# Patient Record
Sex: FEMALE | Race: WHITE | NOT HISPANIC OR LATINO | Employment: OTHER | URBAN - METROPOLITAN AREA
[De-identification: names, ages, dates, MRNs, and addresses within clinical notes are randomized per-mention and may not be internally consistent; named-entity substitution may affect disease eponyms.]

---

## 2023-02-27 ENCOUNTER — HOSPITAL ENCOUNTER (INPATIENT)
Facility: HOSPITAL | Age: 88
LOS: 2 days | Discharge: HOME HEALTH | End: 2023-03-02
Attending: EMERGENCY MEDICINE | Admitting: INTERNAL MEDICINE

## 2023-02-27 ENCOUNTER — APPOINTMENT (EMERGENCY)
Dept: RADIOLOGY | Facility: HOSPITAL | Age: 88
End: 2023-02-27

## 2023-02-27 ENCOUNTER — APPOINTMENT (EMERGENCY)
Dept: CT IMAGING | Facility: HOSPITAL | Age: 88
End: 2023-02-27

## 2023-02-27 DIAGNOSIS — N39.0 URINARY TRACT INFECTION WITHOUT HEMATURIA, SITE UNSPECIFIED: ICD-10-CM

## 2023-02-27 DIAGNOSIS — R29.6 FREQUENT FALLS: ICD-10-CM

## 2023-02-27 DIAGNOSIS — N39.0 URINARY TRACT INFECTION: ICD-10-CM

## 2023-02-27 DIAGNOSIS — R53.1 GENERALIZED WEAKNESS: Primary | ICD-10-CM

## 2023-02-27 LAB
ALBUMIN SERPL BCP-MCNC: 3.7 G/DL (ref 3.5–5)
ALP SERPL-CCNC: 79 U/L (ref 34–104)
ALT SERPL W P-5'-P-CCNC: 10 U/L (ref 7–52)
ANION GAP SERPL CALCULATED.3IONS-SCNC: 8 MMOL/L (ref 4–13)
APTT PPP: 27 SECONDS (ref 23–37)
AST SERPL W P-5'-P-CCNC: 13 U/L (ref 13–39)
BASOPHILS # BLD AUTO: 0.06 THOUSANDS/ÂΜL (ref 0–0.1)
BASOPHILS NFR BLD AUTO: 0 % (ref 0–1)
BILIRUB SERPL-MCNC: 0.49 MG/DL (ref 0.2–1)
BUN SERPL-MCNC: 36 MG/DL (ref 5–25)
CALCIUM SERPL-MCNC: 9.4 MG/DL (ref 8.4–10.2)
CHLORIDE SERPL-SCNC: 105 MMOL/L (ref 96–108)
CO2 SERPL-SCNC: 26 MMOL/L (ref 21–32)
CREAT SERPL-MCNC: 1.64 MG/DL (ref 0.6–1.3)
EOSINOPHIL # BLD AUTO: 0.31 THOUSAND/ÂΜL (ref 0–0.61)
EOSINOPHIL NFR BLD AUTO: 2 % (ref 0–6)
ERYTHROCYTE [DISTWIDTH] IN BLOOD BY AUTOMATED COUNT: 14.3 % (ref 11.6–15.1)
GFR SERPL CREATININE-BSD FRML MDRD: 27 ML/MIN/1.73SQ M
GLUCOSE SERPL-MCNC: 104 MG/DL (ref 65–140)
GLUCOSE SERPL-MCNC: 108 MG/DL (ref 65–140)
HCT VFR BLD AUTO: 31.1 % (ref 34.8–46.1)
HGB BLD-MCNC: 9.9 G/DL (ref 11.5–15.4)
IMM GRANULOCYTES # BLD AUTO: 0.06 THOUSAND/UL (ref 0–0.2)
IMM GRANULOCYTES NFR BLD AUTO: 0 % (ref 0–2)
INR PPP: 0.93 (ref 0.84–1.19)
LACTATE SERPL-SCNC: 0.6 MMOL/L (ref 0.5–2)
LYMPHOCYTES # BLD AUTO: 2.83 THOUSANDS/ÂΜL (ref 0.6–4.47)
LYMPHOCYTES NFR BLD AUTO: 20 % (ref 14–44)
MCH RBC QN AUTO: 30.2 PG (ref 26.8–34.3)
MCHC RBC AUTO-ENTMCNC: 31.8 G/DL (ref 31.4–37.4)
MCV RBC AUTO: 95 FL (ref 82–98)
MONOCYTES # BLD AUTO: 1.18 THOUSAND/ÂΜL (ref 0.17–1.22)
MONOCYTES NFR BLD AUTO: 8 % (ref 4–12)
NEUTROPHILS # BLD AUTO: 10.08 THOUSANDS/ÂΜL (ref 1.85–7.62)
NEUTS SEG NFR BLD AUTO: 70 % (ref 43–75)
NRBC BLD AUTO-RTO: 0 /100 WBCS
PLATELET # BLD AUTO: 278 THOUSANDS/UL (ref 149–390)
PMV BLD AUTO: 10.2 FL (ref 8.9–12.7)
POTASSIUM SERPL-SCNC: 3.8 MMOL/L (ref 3.5–5.3)
PROT SERPL-MCNC: 8 G/DL (ref 6.4–8.4)
PROTHROMBIN TIME: 13.2 SECONDS (ref 11.6–14.5)
RBC # BLD AUTO: 3.28 MILLION/UL (ref 3.81–5.12)
SODIUM SERPL-SCNC: 139 MMOL/L (ref 135–147)
WBC # BLD AUTO: 14.52 THOUSAND/UL (ref 4.31–10.16)

## 2023-02-27 RX ORDER — LEVOTHYROXINE SODIUM 0.1 MG/1
100 TABLET ORAL DAILY
COMMUNITY

## 2023-02-27 RX ORDER — FESOTERODINE FUMARATE 8 MG/1
8 TABLET, EXTENDED RELEASE ORAL DAILY
COMMUNITY
Start: 2023-02-06 | End: 2024-02-01

## 2023-02-27 RX ORDER — ASPIRIN 81 MG/1
81 TABLET ORAL
COMMUNITY

## 2023-02-27 RX ORDER — TIOTROPIUM BROMIDE INHALATION SPRAY 3.12 UG/1
2 SPRAY, METERED RESPIRATORY (INHALATION) 2 TIMES DAILY
COMMUNITY
Start: 2022-12-16

## 2023-02-27 RX ORDER — ALBUTEROL SULFATE 90 UG/1
AEROSOL, METERED RESPIRATORY (INHALATION)
COMMUNITY
Start: 2022-12-16

## 2023-02-28 PROBLEM — E03.9 HYPOTHYROIDISM: Status: ACTIVE | Noted: 2023-02-28

## 2023-02-28 PROBLEM — N39.0 UTI (URINARY TRACT INFECTION): Status: ACTIVE | Noted: 2023-02-28

## 2023-02-28 PROBLEM — J44.9 COPD (CHRONIC OBSTRUCTIVE PULMONARY DISEASE) (HCC): Status: ACTIVE | Noted: 2023-02-28

## 2023-02-28 PROBLEM — N18.30 CKD (CHRONIC KIDNEY DISEASE) STAGE 3, GFR 30-59 ML/MIN (HCC): Status: ACTIVE | Noted: 2023-02-28

## 2023-02-28 PROBLEM — R53.1 GENERALIZED WEAKNESS: Status: ACTIVE | Noted: 2023-02-28

## 2023-02-28 PROBLEM — E11.9 TYPE 2 DIABETES MELLITUS, WITHOUT LONG-TERM CURRENT USE OF INSULIN (HCC): Status: ACTIVE | Noted: 2023-02-28

## 2023-02-28 LAB
2HR DELTA HS TROPONIN: 0 NG/L
ANION GAP SERPL CALCULATED.3IONS-SCNC: 7 MMOL/L (ref 4–13)
ATRIAL RATE: 73 BPM
BACTERIA UR QL AUTO: ABNORMAL /HPF
BASOPHILS # BLD AUTO: 0.06 THOUSANDS/ÂΜL (ref 0–0.1)
BASOPHILS NFR BLD AUTO: 0 % (ref 0–1)
BILIRUB UR QL STRIP: NEGATIVE
BUN SERPL-MCNC: 32 MG/DL (ref 5–25)
CALCIUM SERPL-MCNC: 9.3 MG/DL (ref 8.4–10.2)
CARDIAC TROPONIN I PNL SERPL HS: 12 NG/L
CARDIAC TROPONIN I PNL SERPL HS: 12 NG/L
CHLORIDE SERPL-SCNC: 106 MMOL/L (ref 96–108)
CLARITY UR: ABNORMAL
CO2 SERPL-SCNC: 27 MMOL/L (ref 21–32)
COLOR UR: ABNORMAL
CREAT SERPL-MCNC: 1.53 MG/DL (ref 0.6–1.3)
EOSINOPHIL # BLD AUTO: 0.31 THOUSAND/ÂΜL (ref 0–0.61)
EOSINOPHIL NFR BLD AUTO: 2 % (ref 0–6)
ERYTHROCYTE [DISTWIDTH] IN BLOOD BY AUTOMATED COUNT: 14.3 % (ref 11.6–15.1)
FLUAV RNA RESP QL NAA+PROBE: NEGATIVE
FLUBV RNA RESP QL NAA+PROBE: NEGATIVE
GFR SERPL CREATININE-BSD FRML MDRD: 30 ML/MIN/1.73SQ M
GLUCOSE SERPL-MCNC: 106 MG/DL (ref 65–140)
GLUCOSE SERPL-MCNC: 109 MG/DL (ref 65–140)
GLUCOSE SERPL-MCNC: 114 MG/DL (ref 65–140)
GLUCOSE SERPL-MCNC: 143 MG/DL (ref 65–140)
GLUCOSE UR STRIP-MCNC: NEGATIVE MG/DL
HCT VFR BLD AUTO: 31.5 % (ref 34.8–46.1)
HGB BLD-MCNC: 10 G/DL (ref 11.5–15.4)
HGB UR QL STRIP.AUTO: ABNORMAL
IMM GRANULOCYTES # BLD AUTO: 0.09 THOUSAND/UL (ref 0–0.2)
IMM GRANULOCYTES NFR BLD AUTO: 1 % (ref 0–2)
KETONES UR STRIP-MCNC: NEGATIVE MG/DL
LEUKOCYTE ESTERASE UR QL STRIP: ABNORMAL
LYMPHOCYTES # BLD AUTO: 2.83 THOUSANDS/ÂΜL (ref 0.6–4.47)
LYMPHOCYTES NFR BLD AUTO: 21 % (ref 14–44)
MCH RBC QN AUTO: 30.4 PG (ref 26.8–34.3)
MCHC RBC AUTO-ENTMCNC: 31.7 G/DL (ref 31.4–37.4)
MCV RBC AUTO: 96 FL (ref 82–98)
MONOCYTES # BLD AUTO: 1.25 THOUSAND/ÂΜL (ref 0.17–1.22)
MONOCYTES NFR BLD AUTO: 9 % (ref 4–12)
NEUTROPHILS # BLD AUTO: 8.94 THOUSANDS/ÂΜL (ref 1.85–7.62)
NEUTS SEG NFR BLD AUTO: 67 % (ref 43–75)
NITRITE UR QL STRIP: POSITIVE
NON-SQ EPI CELLS URNS QL MICRO: ABNORMAL /HPF
NRBC BLD AUTO-RTO: 0 /100 WBCS
OTHER STN SPEC: ABNORMAL
P AXIS: 65 DEGREES
PH UR STRIP.AUTO: 6 [PH]
PLATELET # BLD AUTO: 261 THOUSANDS/UL (ref 149–390)
PMV BLD AUTO: 10.3 FL (ref 8.9–12.7)
POTASSIUM SERPL-SCNC: 3.8 MMOL/L (ref 3.5–5.3)
PR INTERVAL: 124 MS
PROCALCITONIN SERPL-MCNC: 0.06 NG/ML
PROT UR STRIP-MCNC: ABNORMAL MG/DL
QRS AXIS: 72 DEGREES
QRSD INTERVAL: 68 MS
QT INTERVAL: 414 MS
QTC INTERVAL: 456 MS
RBC # BLD AUTO: 3.29 MILLION/UL (ref 3.81–5.12)
RBC #/AREA URNS AUTO: ABNORMAL /HPF
RSV RNA RESP QL NAA+PROBE: NEGATIVE
SARS-COV-2 RNA RESP QL NAA+PROBE: NEGATIVE
SODIUM SERPL-SCNC: 140 MMOL/L (ref 135–147)
SP GR UR STRIP.AUTO: 1.02 (ref 1–1.03)
T WAVE AXIS: 68 DEGREES
UROBILINOGEN UR STRIP-ACNC: <2 MG/DL
VENTRICULAR RATE: 73 BPM
WBC # BLD AUTO: 13.48 THOUSAND/UL (ref 4.31–10.16)
WBC #/AREA URNS AUTO: ABNORMAL /HPF

## 2023-02-28 RX ORDER — ONDANSETRON 2 MG/ML
4 INJECTION INTRAMUSCULAR; INTRAVENOUS EVERY 6 HOURS PRN
Status: DISCONTINUED | OUTPATIENT
Start: 2023-02-28 | End: 2023-03-02 | Stop reason: HOSPADM

## 2023-02-28 RX ORDER — ASPIRIN 81 MG/1
81 TABLET ORAL DAILY
Status: DISCONTINUED | OUTPATIENT
Start: 2023-02-28 | End: 2023-03-02 | Stop reason: HOSPADM

## 2023-02-28 RX ORDER — INSULIN LISPRO 100 [IU]/ML
1-6 INJECTION, SOLUTION INTRAVENOUS; SUBCUTANEOUS
Status: DISCONTINUED | OUTPATIENT
Start: 2023-02-28 | End: 2023-03-02 | Stop reason: HOSPADM

## 2023-02-28 RX ORDER — ACETAMINOPHEN 325 MG/1
650 TABLET ORAL EVERY 6 HOURS PRN
Status: DISCONTINUED | OUTPATIENT
Start: 2023-02-28 | End: 2023-03-02 | Stop reason: HOSPADM

## 2023-02-28 RX ORDER — OXYBUTYNIN CHLORIDE 5 MG/1
10 TABLET, EXTENDED RELEASE ORAL DAILY
Status: DISCONTINUED | OUTPATIENT
Start: 2023-02-28 | End: 2023-03-02 | Stop reason: HOSPADM

## 2023-02-28 RX ORDER — CEFTRIAXONE 1 G/50ML
1000 INJECTION, SOLUTION INTRAVENOUS ONCE
Status: COMPLETED | OUTPATIENT
Start: 2023-02-28 | End: 2023-02-28

## 2023-02-28 RX ORDER — CEFTRIAXONE 1 G/50ML
1000 INJECTION, SOLUTION INTRAVENOUS EVERY 24 HOURS
Status: DISCONTINUED | OUTPATIENT
Start: 2023-02-28 | End: 2023-03-02 | Stop reason: HOSPADM

## 2023-02-28 RX ORDER — ALBUTEROL SULFATE 90 UG/1
1 AEROSOL, METERED RESPIRATORY (INHALATION) EVERY 6 HOURS PRN
Status: DISCONTINUED | OUTPATIENT
Start: 2023-02-28 | End: 2023-03-02 | Stop reason: HOSPADM

## 2023-02-28 RX ORDER — HEPARIN SODIUM 5000 [USP'U]/ML
5000 INJECTION, SOLUTION INTRAVENOUS; SUBCUTANEOUS EVERY 8 HOURS SCHEDULED
Status: DISCONTINUED | OUTPATIENT
Start: 2023-02-28 | End: 2023-03-02 | Stop reason: HOSPADM

## 2023-02-28 RX ORDER — LEVOTHYROXINE SODIUM 0.1 MG/1
100 TABLET ORAL
Status: DISCONTINUED | OUTPATIENT
Start: 2023-02-28 | End: 2023-03-02 | Stop reason: HOSPADM

## 2023-02-28 RX ADMIN — ASPIRIN 81 MG: 81 TABLET, COATED ORAL at 08:29

## 2023-02-28 RX ADMIN — HEPARIN SODIUM 5000 UNITS: 5000 INJECTION INTRAVENOUS; SUBCUTANEOUS at 21:58

## 2023-02-28 RX ADMIN — ONDANSETRON 4 MG: 2 INJECTION INTRAMUSCULAR; INTRAVENOUS at 23:44

## 2023-02-28 RX ADMIN — CEFTRIAXONE 1000 MG: 1 INJECTION, SOLUTION INTRAVENOUS at 00:42

## 2023-02-28 RX ADMIN — HEPARIN SODIUM 5000 UNITS: 5000 INJECTION INTRAVENOUS; SUBCUTANEOUS at 05:23

## 2023-02-28 RX ADMIN — OXYBUTYNIN CHLORIDE 10 MG: 5 TABLET, EXTENDED RELEASE ORAL at 08:29

## 2023-02-28 RX ADMIN — LEVOTHYROXINE SODIUM 100 MCG: 100 TABLET ORAL at 05:23

## 2023-02-28 NOTE — ASSESSMENT & PLAN NOTE
Lab Results   Component Value Date    EGFR 27 02/27/2023    CREATININE 1 64 (H) 02/27/2023   · Creatinine 1 64 on admission  · Baseline from care everywhere appears to be about 1 3-1 7  · Continue to monitor

## 2023-02-28 NOTE — PLAN OF CARE
Problem: OCCUPATIONAL THERAPY ADULT  Goal: Performs self-care activities at highest level of function for planned discharge setting  See evaluation for individualized goals  Description:   Outcome: Progressing  Note: Limitation: Decreased ADL status, Decreased UE strength, Decreased Safe judgement during ADL, Decreased cognition, Decreased endurance, Decreased self-care trans, Decreased high-level ADLs  Prognosis: Good  Assessment: Pt is a 80 y o  female seen for OT evaluation at [facility], admitted 2/27/2023 w/ Generalized weakness  OT completed extensive review of pt's medical and social history  Comorbidities affecting pt's functional performance at time of assessment include: UTI, generalized weakness, CKD, DM type 2, COPD  Personal factors affecting pt at time of IE include:steps to enter environment, limited home support, behavioral pattern, difficulty performing ADLS, limited insight into deficits, compliance and health management   Prior to admission, pt was living in [place], [place] with [address], and was independent with ADL, assisted with IADL  Upon evaluation, pt presents to OT below baseline due to the following performance deficits: weakness, decreased strength, decreased balance, decreased tolerance, impaired problem solving and decreased safety awareness  Pt to benefit from continued skilled OT tx while in the hospital to address deficits as defined above and maximize level of functional independence w ADL's and functional mobility  Occupational Performance areas to address include: grooming, bathing/shower, toilet hygiene, dressing, functional mobility and functional transfers, bed mobility  The patient's raw score on the AM-PAC Daily Activity inpatient short form is 17, standardized score is 37 26, less than 39 4  Patients at this level are likely to benefit from DC to post-acute rehabilitation services  Based on findings, pt is of high complexity, due to medical complexity   At this time, OT recommendations at time of discharge are short term rehab       OT Discharge Recommendation: Post acute rehabilitation services

## 2023-02-28 NOTE — ASSESSMENT & PLAN NOTE
No results found for: HGBA1C    Recent Labs     02/27/23  2225   POCGLU 104       Blood Sugar Average: Last 72 hrs:  (P) 104   · Home regimen: Januvia daily  · Hold home oral hypoglycemic  · SSI

## 2023-02-28 NOTE — OCCUPATIONAL THERAPY NOTE
Occupational Therapy Evaluation & Treatment      Ag Olmos    2/28/2023    Principal Problem:    Generalized weakness  Active Problems:    UTI (urinary tract infection)    CKD (chronic kidney disease) stage 3, GFR 30-59 ml/min ([facility])    Type 2 diabetes mellitus, without long-term current use of insulin ([facility])    Hypothyroidism    COPD (chronic obstructive pulmonary disease) ([facility])      Past Medical History:   Diagnosis Date    COPD (chronic obstructive pulmonary disease) ([facility])     Diabetes mellitus ([facility] )     Disease of thyroid gland     Hypertension        History reviewed  No pertinent surgical history  02/28/23 0955   OT Last Visit   OT Visit Date 02/28/23   Note Type   Note type Evaluation   Pain Assessment   Pain Assessment Tool 0-10   Pain Score 4   Pain Location/Orientation Location: Head  ("just a headache")   Restrictions/Precautions   Other Precautions Cognitive; Chair Alarm; Bed Alarm; Fall Risk;Hard of hearing   Home Living   Type of [address] [place] [address] Multi-level; Able to live on main level with bedroom/bathroom  (4STE)   Bathroom Shower/Tub Walk-in shower   [address] [facility]   Additional Comments (S)  Pt reports she uses a RW all the time  Per RN, this is untrue and pt is resistive to use RW at home  Prior Function   Level of [place] Independent with ADLs; Needs assistance with IADLS   Lives With Family  (grandson)   Receives Help From Family   IADLs Family/Friend/Other provides meals; Independent with medication management; Independent with driving  (reports she hasn't been driving much the past few weeks, but prior to that was driving)   Falls in the last 6 months (S)  1 to 4  (all within the past month; none prior)   Comments (S)  Home alone during the day when family is at work   [address]  [address] [facility] 3  Moderate Assistance   [address] 4 Minimal Assistance   LB Dressing Assistance 3  Moderate Assistance   Toileting Assistance  4  Minimal Assistance   Bed Mobility   Supine to Sit 4  Minimal assistance   Additional items Assist x 1   Sit to Supine 4  Minimal assistance   Additional items Assist x 1   Additional Comments Min A x1 to maintain sitting balance at EOB; Mod A for balance while attempting to don socks  Transfers   Sit to Stand 4  Minimal assistance   Additional items Assist x 1   Stand to Sit 4  Minimal assistance   Additional items Assist x 1   Stand pivot 4  Minimal assistance   Additional items Assist x 1  (RW)   Functional Mobility   Functional Mobility 4  Minimal assistance   Additional Comments x1; 1 LOB balance noted while holding onto grab bar, requiring therapist assistance to correct  Additional items Rolling walker   Activity Tolerance   Activity Tolerance Patient tolerated treatment well   Nurse Made Aware OSEAS Jaime   RUE Assessment   RUE Assessment WFL   LUE Assessment   LUE Assessment WFL   Cognition   Overall Cognitive Status WFL   Arousal/Participation Alert; Cooperative   Attention Attends with cues to redirect   Orientation Level Oriented X4   Memory Unable to assess  (Pt very [place])   Following Commands Follows one step commands with increased time or repetition   Comments Pt very [place]   Assessment   Limitation Decreased ADL status; Decreased UE strength;Decreased Safe judgement during ADL;Decreased cognition;Decreased endurance;Decreased self-care trans;Decreased high-level ADLs   Prognosis Good   Assessment Pt is a 80 y o  female seen for OT evaluation at [facility], admitted 2/27/2023 w/ Generalized weakness  OT completed extensive review of pt's medical and social history  Comorbidities affecting pt's functional performance at time of assessment include: UTI, generalized weakness, CKD, DM type 2, COPD   Personal factors affecting pt at time of IE include:steps to enter environment, limited home support, behavioral pattern, difficulty performing ADLS, limited insight into deficits, compliance and health management   Prior to admission, pt was living in [place], [place] with grandson, 1st fl setup, and was independent with ADL, assisted with IADL  Upon evaluation, pt presents to OT below baseline due to the following performance deficits: weakness, decreased strength, decreased balance, decreased tolerance, impaired problem solving and decreased safety awareness  Pt to benefit from continued skilled OT tx while in the hospital to address deficits as defined above and maximize level of functional independence w ADL's and functional mobility  Occupational Performance areas to address include: grooming, bathing/shower, toilet hygiene, dressing, functional mobility and functional transfers, bed mobility  The patient's raw score on the AM-PAC Daily Activity inpatient short form is 17, standardized score is 37 26, less than 39 4  Patients at this level are likely to benefit from DC to post-acute rehabilitation services  Based on findings, pt is of high complexity, due to medical complexity  At this time, OT recommendations at time of discharge are short term rehab  Goals   Patient Goals go home   Plan   Treatment Interventions ADL retraining;Functional transfer training; Endurance training;UE strengthening/ROM; Cognitive reorientation;Patient/family training;Equipment evaluation/education; Compensatory technique education;Continued evaluation; Energy conservation   Goal Expiration Date 03/10/23   OT Frequency 3-5x/wk   Recommendation   OT Discharge Recommendation Post acute rehabilitation services   AM-[facility] Daily Activity Inpatient   Lower Body Dressing 2   Bathing 2   Toileting 3   Upper Body Dressing 3   Grooming 3   Eating 4   Daily Activity Raw Score 17   Daily Activity Standardized Score (Calc for Raw Score >=11) 37 26   AM-PAC Applied Cognition Inpatient   Following a Speech/Presentation 2   Understanding Ordinary Conversation 3   Taking Medications 2   Remembering Where Things Are Placed or Put Away 2   Remembering List of 4-5 Errands 2   Taking Care of Complicated Tasks 2   Applied Cognition Raw Score 13   Applied Cognition Standardized Score 30 46     Treatment Note:    Patient participated in Skilled OT session this date with interventions consisting of ADL re training with the use of correct body mechnaics, safety awareness and fall prevention techniques,  therapeutic activities to: increase activity tolerance, and increase dynamic sit/ stand balance during functional activity    Patient agreeable to OT treatment session, upon arrival patient was found supine in bed  Treatment session as follows: Pt sat EOB with Min A, required Min-Mod A for seated balance at EOB, Min A for transfers and functional mobility  Mod A to don socks at EOB, Min A for toileting  Vcs throughout for safe us of RW; attempted to abandon at times  1LOB noted while holding onto grab rail unilaterally for clothing management after toileting, required therapist assist to correct  Patient continues to be functioning below baseline level, occupational performance remains limited secondary to factors listed above and increased risk for falls and injury  The patient's raw score on the AM-PAC Daily Activity inpatient short form is 17, standardized score is 37 26, less than 39 4  Patients at this level are likely to benefit from DC to post-acute rehabilitation services  From OT standpoint, recommendation at time of d/c would be Short Term Rehab  Patient to benefit from continued Occupational Therapy treatment while in the hospital to address deficits as defined above and maximize level of functional independence with ADLs and functional mobility    Pt left with call bell in reach, tray table in reach, needs met, bed alarm activated  Pt will achieve the following goals within 10 days  *Pt will complete grooming with independence      *Pt will complete UB bathing and dressing with independence  *Pt will complete LB bathing and dressing with modified independence   *Pt will complete toileting (hygiene and clothing management) with independence  *Pt will complete bed mobility with modified independence, with bed flat and no side rail to prep for purposeful tasks    *Pt will perform functional transfers with modified independence in order to complete ADL routine  *Pt will increase standing tolerance to 3-5 minutes in order to complete ADL routine  *Pt will complete item retrieval with supervision and RW while demonstrating good safety  *Pt will demonstrate increased activity tolerance in order to complete ADL routine  *Pt will participate in cognitive assessment to determine level of safety for returning home    *Pt will participate in UE therapeutic exercise in order to maximize strength for ADL transfers  *Pt will sit on EOB for 10+ minutes for increased safety with seated activity tolerance during ADL tasks  *Pt will identify 3-5 fall risks to ensure safety upon discharge      [email or web address], MS, OTR/L

## 2023-02-28 NOTE — H&P
[place]  H&P- Bebeto Sever 1934, 80 y o  female MRN: [id number]  Unit/Bed#: ED 06 Encounter: [id number]  Primary Care Provider: Jerardo Rizzo MD   Date and time admitted to hospital: 2/27/2023 10:27 PM    * Generalized weakness  Assessment & Plan  · Presents from home due to generalized weakness with multiple falls over the past several days  Daily aspirin use  · No injuries noted on CT head and CT cervical spine  Patient denies injuries from the falls  · Found to have UTI infection  · possible etiology for increased weakness  · Patient also with admission to [facility] in December 2022, was discharged to [address] [place] [address]  Per nephew patient has been doing well at his house since she was discharged from [address] [place] [address] except for the last several days  · Fall precautions  · Consult PT/OT and case management    UTI (urinary tract infection)  Assessment & Plan  · No urinary complaints  · Urine  · UA: Large amount of leukocytes, positive nitrate  · Micro: 20-30 WBC, large amounts of bacteria  · Patient with admission at [facility] in December 2022 also with UTI/weakness  · Did not meet SIRS criteria  WBC 14 52    Vitals WNL  · Lactic and procalcitonin WNL  · Started on IV ceftriaxone, continue  · Urine cultures and blood cultures pending    COPD (chronic obstructive pulmonary disease) ([facility])  Assessment & Plan  · Not appear in COPD exacerbation at this time  · Continue home inhaler    Hypothyroidism  Assessment & Plan  · Continue levothyroxine 100 mcg daily    Type 2 diabetes mellitus, without long-term current use of insulin ([facility])  Assessment & Plan  No results found for: HGBA1C    Recent Labs     02/27/23  2225   POCGLU 104       Blood Sugar Average: Last 72 hrs:  (P) 104   · Home regimen: Januvia daily  · Hold home oral hypoglycemic  · SSI    CKD (chronic kidney disease) stage 3, GFR 30-59 ml/min [facility])  Assessment & Plan  Lab Results   Component Value Date    EGFR 27 02/27/2023 CREATININE 1 64 (H) 02/27/2023   · Creatinine 1 64 on admission  · Baseline from care everywhere appears to be about 1 3-1 7  · Continue to monitor    VTE Pharmacologic Prophylaxis: VTE Score: 4 Moderate Risk (Score 3-4) - Pharmacological DVT Prophylaxis Ordered: heparin  Code Status: Level 3 - DNAR and DNI   Discussion with family: Updated  (son) at bedside  Anticipated Length of Stay: Patient will be admitted on an inpatient basis with an anticipated length of stay of greater than 2 midnights secondary to Weakness  Total Time Spent on Date of Encounter in care of patient: 55 minutes This time was spent on one or more of the following: performing physical exam; counseling and coordination of care; obtaining or reviewing history; documenting in the medical record; reviewing/ordering tests, medications or procedures; communicating with other healthcare professionals and discussing with patient's family/caregivers  Chief Complaint:     History of Present Illness:  Bethany Ward is a 80 y o  female with a PMH of CKD 3, COPD, hypothyroidism, DM2 who presents with weakness and falls over the past couple of days  No injuries noted on imaging  Per family member at bedside patient with recent admission at [facility] in December 2022 due to UTI  Discharged to [address] [place] [address] and then discharged to home at end of January 2023  Patient has been doing well at home over the past month except for the past week  Patient without complaints on exam  Hard of hearing but is able to answer questioning and follow commands  Patients family denies any dementia or confusion  Review of Systems:  Review of Systems   Constitutional: Negative for fatigue and fever  HENT: Negative for sore throat  Respiratory: Negative for cough, chest tightness and shortness of breath  Cardiovascular: Negative for chest pain  Gastrointestinal: Negative for abdominal distention, abdominal pain, diarrhea, nausea and vomiting  Genitourinary: Negative for difficulty urinating  Musculoskeletal: Negative for arthralgias  Neurological: Positive for weakness  Negative for headaches  Psychiatric/Behavioral: Negative for agitation and behavioral problems  All other systems reviewed and are negative  Past Medical and Surgical History:   Past Medical History:   Diagnosis Date   • COPD (chronic obstructive pulmonary disease) ([facility] )    • Diabetes mellitus ([facility] )    • Disease of thyroid gland    • Hypertension        History reviewed  No pertinent surgical history  Meds/Allergies:  Prior to Admission medications    Medication Sig Start Date End Date Taking? Authorizing Provider   albuterol (PROVENTIL HFA,VENTOLIN HFA) 90 mcg/act inhaler inhale 2 puff by mouth and INTO THE LUNGS every 4 hours if needed for wheezing 12/16/22  Yes Historical Provider, MD   aspirin (ECOTRIN LOW STRENGTH) 81 mg EC tablet Take 81 mg by mouth   Yes Historical Provider, MD   Fesoterodine Fumarate ER 8 MG TB24 Take 8 mg by mouth daily 2/6/23 2/1/24 Yes Historical Provider, MD   levothyroxine 100 mcg tablet Take 100 mcg by mouth daily   Yes Historical Provider, MD   sitaGLIPtin (Januvia) 25 mg tablet  1/25/23  Yes Historical Provider, MD   tiotropium (Spiriva Respimat) 2 5 MCG/ACT AERS inhaler Inhale 2 puffs 2 (two) times a day 12/16/22  Yes Historical Provider, MD     I have reviewed home medications with patient family member  Allergies: Allergies   Allergen Reactions   • Aspirin GI Intolerance   • Erythromycin Other (See Comments)     unknown   • Ibuprofen Hives   • Iodine - Food Allergy Hives   • Lidocaine Hives   • Metformin Diarrhea   • Penicillin V Hives     TOLERATED CEFTRIAXONE 12/2022   • Rosuvastatin Myalgia   • Sulfamethoxazole-Trimethoprim Hives       Social History:  Marital Status:     Occupation: Retired   Patient Pre-hospital Living Situation: Home  Patient Pre-hospital Level of Mobility: walks with walker  Patient Pre-hospital Diet Restrictions: Diabetic   Substance Use History:   Social History     Substance and Sexual Activity   Alcohol Use Never     Social History     Tobacco Use   Smoking Status Unknown   Smokeless Tobacco Not on file     Social History     Substance and Sexual Activity   Drug Use Not on file       Family History:  History reviewed  No pertinent family history  Physical Exam:     Vitals:   Blood Pressure: (!) 197/81 (02/28/23 0200)  Pulse: 78 (02/28/23 0200)  Temperature: 98 1 °F (36 7 °C) (02/27/23 2235)  Respirations: 20 (02/28/23 0200)  Height: 5' 1" (154 9 cm) (02/28/23 0232)  Weight - Scale: 54 9 kg (121 lb) (02/28/23 0232)  SpO2: 92 % (02/28/23 0200)    Physical Exam  Vitals and nursing note reviewed  Constitutional:       Appearance: Normal appearance  HENT:      Head: Normocephalic  Ears:      Comments: Hard of hearing   Eyes:      Extraocular Movements: Extraocular movements intact  Pupils: Pupils are equal, round, and reactive to light  Cardiovascular:      Rate and Rhythm: Normal rate and regular rhythm  Heart sounds: No murmur heard  Pulmonary:      Effort: Pulmonary effort is normal  No respiratory distress  Breath sounds: Normal breath sounds  No wheezing  Abdominal:      General: Bowel sounds are normal  There is no distension  Tenderness: There is no abdominal tenderness  There is no guarding  Musculoskeletal:         General: Normal range of motion  Cervical back: Normal range of motion  Right lower leg: No edema  Left lower leg: No edema  Skin:     General: Skin is warm  Neurological:      General: No focal deficit present  Mental Status: She is alert and oriented to person, place, and time  Mental status is at baseline  Psychiatric:         Mood and Affect: Mood normal          Behavior: Behavior normal          Thought Content:  Thought content normal           Additional Data:     Lab Results:  Results from last 7 days   Lab Units 02/27/23  2317   WBC Thousand/uL 14 52*   HEMOGLOBIN g/dL 9 9*   HEMATOCRIT % 31 1*   PLATELETS Thousands/uL 278   NEUTROS PCT % 70   LYMPHS PCT % 20   MONOS PCT % 8   EOS PCT % 2     Results from last 7 days   Lab Units 02/27/23  2317   SODIUM mmol/L 139   POTASSIUM mmol/L 3 8   CHLORIDE mmol/L 105   CO2 mmol/L 26   BUN mg/dL 36*   CREATININE mg/dL 1 64*   ANION GAP mmol/L 8   CALCIUM mg/dL 9 4   ALBUMIN g/dL 3 7   TOTAL BILIRUBIN mg/dL 0 49   ALK PHOS U/L 79   ALT U/L 10   AST U/L 13   GLUCOSE RANDOM mg/dL 108     Results from last 7 days   Lab Units 02/27/23  2317   INR  0 93     Results from last 7 days   Lab Units 02/27/23  2225   POC GLUCOSE mg/dl 104         Results from last 7 days   Lab Units 02/27/23 2317   LACTIC ACID mmol/L 0 6   PROCALCITONIN ng/ml 0 06       Lines/Drains:  Invasive Devices     Peripheral Intravenous Line  Duration           Peripheral IV 02/27/23 Right;Ventral (anterior) Forearm <1 day                    Imaging: Reviewed radiology reports from this admission including: CT head and CT spine cervical  CT head without contrast   Final Result by Bhavna Farrar MD (02/27 2352)      No acute intracranial abnormality  Marked chronic small vessel ischemic changes  Mild to moderate central greater than cortical volume loss  Workstation performed: [id number]         CT spine cervical without contrast   Final Result by Bhavna Farrar MD (02/28 0006)      No cervical spine fracture or traumatic malalignment  Workstation performed: [id number]         XR chest portable   ED Interpretation by Mehdi Tam DO (02/27 2330)   This study was ordered and independently reviewed by me    No acute findings noted             EKG and Other Studies Reviewed on Admission:   · EKG: NSR  HR 73     ** Please Note: This note has been constructed using a voice recognition system   **

## 2023-02-28 NOTE — ASSESSMENT & PLAN NOTE
· No urinary complaints  · Urine  · UA: Large amount of leukocytes, positive nitrate  · Micro: 20-30 WBC, large amounts of bacteria  · Patient with admission at [facility] in December 2022 also with UTI/weakness  · Did not meet SIRS criteria  WBC 14 52    Vitals WNL  · Lactic and procalcitonin WNL  · Started on IV ceftriaxone, continue  · Urine cultures and blood cultures pending

## 2023-02-28 NOTE — ED PROVIDER NOTES
History  Chief Complaint   Patient presents with   • Weakness - Generalized     Patient has experienced   • Fall     Pt c/o of multple falls the last tow days  51-year-old female with past medical history of diabetes, COPD, hypertension presents for evaluation secondary to generalized weakness, frequent falls the last week  Patient is hard of hearing and history is provided also by family member at bedside, apparently patient was admitted to [facility] in December with a urinary tract infection and vertigo after discharge she went to a short-term rehab and was discharged at the end of the January and he has been doing well at home living with her nephew  Started having worsening symptoms over the last week, no specific fever, nausea, vomiting, does have chronic diarrhea but no change in bowel habits  [place] complaints, apparently fell up to 5 times over the last several days and does take baby aspirin daily  Patient herself does not have any specific complaints at this time, nonfocal neurologic exam          Prior to Admission Medications   Prescriptions Last Dose Informant Patient Reported? Taking? Fesoterodine Fumarate ER 8 MG TB24   Yes Yes   Sig: Take 8 mg by mouth daily   albuterol (PROVENTIL HFA,VENTOLIN HFA) 90 mcg/act inhaler   Yes Yes   Sig: inhale 2 puff by mouth and INTO THE LUNGS every 4 hours if needed for wheezing   aspirin (ECOTRIN LOW STRENGTH) 81 mg EC tablet   Yes Yes   Sig: Take 81 mg by mouth   levothyroxine 100 mcg tablet   Yes Yes   Sig: Take 100 mcg by mouth daily   sitaGLIPtin (Januvia) 25 mg tablet   Yes Yes   tiotropium (Spiriva Respimat) 2 5 MCG/ACT AERS inhaler   Yes Yes   Sig: Inhale 2 puffs 2 (two) times a day      Facility-Administered Medications: None       Past Medical History:   Diagnosis Date   • COPD (chronic obstructive pulmonary disease) ([facility])    • Diabetes mellitus ([facility] [address] )    • Disease of thyroid gland    • Hypertension        History reviewed   No pertinent surgical history  History reviewed  No pertinent family history  I have reviewed and agree with the history as documented  E-Cigarette/Vaping   • E-Cigarette Use Never User      E-Cigarette/Vaping Substances     Social History     Tobacco Use   • Smoking status: Unknown   Vaping Use   • Vaping Use: Never used   Substance Use Topics   • Alcohol use: Never       Review of Systems   Constitutional: Positive for fatigue  Physical Exam  Physical Exam  Vitals and nursing note reviewed  Constitutional:       Appearance: She is well-developed  HENT:      Head: Normocephalic and atraumatic  Eyes:      Pupils: Pupils are equal, round, and reactive to light  Neck:      Comments: No midline cervical spine tenderness or step-offs  Cardiovascular:      Rate and Rhythm: Normal rate and regular rhythm  Heart sounds: No murmur heard  No friction rub  No gallop  Pulmonary:      Effort: Pulmonary effort is normal       Breath sounds: No wheezing or rales  Chest:      Chest wall: No tenderness  Abdominal:      General: There is no distension  Palpations: Abdomen is soft  There is no mass  Tenderness: There is no abdominal tenderness  There is no guarding or rebound  Musculoskeletal:         General: Normal range of motion  Cervical back: Normal range of motion and neck supple  Comments: No T, L spine tenderness or step-offs   Skin:     General: Skin is warm and dry  Neurological:      General: No focal deficit present  Mental Status: She is alert  Mental status is at baseline           Vital Signs  ED Triage Vitals   Temperature Pulse Respirations Blood Pressure SpO2   02/27/23 2235 02/27/23 2231 02/27/23 2231 02/27/23 2231 02/27/23 2231   98 1 °F (36 7 °C) 84 18 (!) 184/80 98 %      Temp src Heart Rate Source Patient Position - Orthostatic VS BP Location FiO2 (%)   -- 02/27/23 2231 02/27/23 2231 02/27/23 2231 --    Monitor;Right Sitting Left arm       Pain Score 02/27/23 2231       No Pain           Vitals:    02/27/23 2231 02/27/23 2316   BP: (!) 184/80 160/73   Pulse: 84 74   Patient Position - Orthostatic VS: Sitting Sitting         Visual Acuity      ED Medications  Medications   cefTRIAXone (ROCEPHIN) IVPB (premix in dextrose) 1,000 mg 50 mL (has no administration in time range)       Diagnostic Studies  Results Reviewed     Procedure Component Value Units Date/Time    UA w Reflex to Microscopic w Reflex to Culture [[id number]]  (Abnormal) Collected: 02/28/23 0003    Lab Status: Final result Specimen: Urine, Clean Catch Updated: 02/28/23 0032     Color, UA Light Yellow     Clarity, UA Slightly Cloudy     Specific [place], UA 1 020     pH, UA 6 0     Leukocytes, UA Large     Nitrite, UA Positive     Protein, UA 30 (1+) mg/dl      Glucose, UA Negative mg/dl      Ketones, UA Negative mg/dl      Urobilinogen, UA <2 0 mg/dl      Bilirubin, UA Negative     Occult Blood, UA Small    Urine Microscopic [[id number]] Collected: 02/28/23 0003    Lab Status:  In process Specimen: Urine, Clean Catch Updated: 02/28/23 0022    HS Troponin I 4hr [[id number]]     Lab Status: No result Specimen: Blood     Procalcitonin [[id number]]  (Normal) Collected: 02/27/23 2317    Lab Status: Final result Specimen: Blood from Arm, Right Updated: 02/28/23 0002     Procalcitonin 0 06 ng/ml     HS Troponin 0hr (reflex protocol) [[id number]]  (Normal) Collected: 02/27/23 2317    Lab Status: Final result Specimen: Blood from Arm, Right Updated: 02/28/23 0001     hs TnI 0hr 12 ng/L     HS Troponin I 2hr [[id number]]     Lab Status: No result Specimen: Blood     Protime-INR [[id number]]  (Normal) Collected: 02/27/23 2317    Lab Status: Final result Specimen: Blood from Arm, Right Updated: 02/27/23 2356     Protime 13 2 seconds      INR 0 93    APTT [[id number]]  (Normal) Collected: 02/27/23 2317    Lab Status: Final result Specimen: Blood from Arm, Right Updated: 02/27/23 2356     PTT 27 seconds     Lactic acid [[id number]]  (Normal) Collected: 02/27/23 2317    Lab Status: Final result Specimen: Blood from Arm, Right Updated: 02/27/23 2353     LACTIC ACID 0 6 mmol/L     Narrative:      Result may be elevated if tourniquet was used during collection  Comprehensive metabolic panel [[id number]]  (Abnormal) Collected: 02/27/23 2317    Lab Status: Final result Specimen: Blood from Arm, Right Updated: 02/27/23 2352     Sodium 139 mmol/L      Potassium 3 8 mmol/L      Chloride 105 mmol/L      CO2 26 mmol/L      ANION GAP 8 mmol/L      BUN 36 mg/dL      Creatinine 1 64 mg/dL      Glucose 108 mg/dL      Calcium 9 4 mg/dL      AST 13 U/L      ALT 10 U/L      Alkaline Phosphatase 79 U/L      Total Protein 8 0 g/dL      Albumin 3 7 g/dL      Total Bilirubin 0 49 mg/dL      eGFR 27 ml/min/1 73sq m     Narrative:      [place] guidelines for Chronic Kidney Disease (CKD):   •  Stage 1 with normal or high GFR (GFR > 90 mL/min/1 73 square meters)  •  Stage 2 Mild CKD (GFR = 60-89 mL/min/1 73 square meters)  •  Stage 3A Moderate CKD (GFR = 45-59 mL/min/1 73 square meters)  •  Stage 3B Moderate CKD (GFR = 30-44 mL/min/1 73 square meters)  •  Stage 4 Severe CKD (GFR = 15-29 mL/min/1 73 square meters)  •  Stage 5 End Stage CKD (GFR <15 mL/min/1 73 square meters)  Note: GFR calculation is accurate only with a steady state creatinine    FLU/RSV/COVID - if FLU/RSV clinically relevant [[id number]] Collected: 02/27/23 2317    Lab Status:  In process Specimen: Nares from Nose Updated: 02/27/23 2340    CBC and differential [[id number]]  (Abnormal) Collected: 02/27/23 2317    Lab Status: Final result Specimen: Blood from Arm, Right Updated: 02/27/23 2338     WBC 14 52 Thousand/uL      RBC 3 28 Million/uL      Hemoglobin 9 9 g/dL      Hematocrit 31 1 %      MCV 95 fL      MCH 30 2 pg      MCHC 31 8 g/dL      RDW 14 3 %      MPV 10 2 fL      Platelets 573 Thousands/uL      nRBC 0 /100 WBCs      Neutrophils Relative 70 % Immat GRANS % 0 %      Lymphocytes Relative 20 %      Monocytes Relative 8 %      Eosinophils Relative 2 %      Basophils Relative 0 %      Neutrophils Absolute 10 08 Thousands/µL      Immature Grans Absolute 0 06 Thousand/uL      Lymphocytes Absolute 2 83 Thousands/µL      Monocytes Absolute 1 18 Thousand/µL      Eosinophils Absolute 0 31 Thousand/µL      Basophils Absolute 0 06 Thousands/µL     Blood culture #1 [[id number]] Collected: 02/27/23 2317    Lab Status: In process Specimen: Blood from Line, Venous Updated: 02/27/23 2334    Blood culture #2 [[id number]] Collected: 02/27/23 2317    Lab Status: In process Specimen: Blood from Line, Venous Updated: 02/27/23 2334    Fingerstick Glucose (POCT) [[id number]]  (Normal) Collected: 02/27/23 2225    Lab Status: Final result Updated: 02/27/23 2225     POC Glucose 104 mg/dl                  CT head without contrast   Final Result by Luis Eduardo Solano MD (02/27 2352)      No acute intracranial abnormality  Marked chronic small vessel ischemic changes  Mild to moderate central greater than cortical volume loss  Workstation performed: [id number]         CT spine cervical without contrast   Final Result by Luis Eduardo Solano MD (02/28 0006)      No cervical spine fracture or traumatic malalignment  Workstation performed: [id number]         XR chest portable   ED Interpretation by Cris Nava DO (02/27 2330)   This study was ordered and independently reviewed by me    No acute findings noted                    Procedures  Procedures         ED Course  ED Course as of 02/28/23 0042   Mon Feb 27, 2023   2354 Creatinine(!): 1 64  Baseline 1 6-1 7  per labs in care everywhere   2354 WBC(!): 14 52   2354 Hemoglobin(!): 9 9  Baseline 9-10 per labs in care everywhere     6639 Noted reviewed in care everywhere from admission to [facility] 12/24 for E coli pyelonephritis and sepsis   Initially started on Levaquin however WBC kept increasing, patient switched to Ceftriaxone with improvement of labwork and status  Tue Feb 28, 2023   0040 I discussed the case with the hospitalist  We reviewed the HPI, pertinent PMH, ED course and workup  Hospitalist agreed with plan and will admit the patient to the hospital                                SBIRT 22yo+    Flowsheet Row Most Recent Value   SBIRT (23 yo +)    In order to provide better care to our patients, we are screening all of our patients for alcohol and drug use  Would it be okay to ask you these screening questions? No Filed at: 02/27/2023 2330   Initial Alcohol Screen: US AUDIT-C     1  How often do you have a drink containing alcohol? 0 Filed at: 02/27/2023 2330   2  How many drinks containing alcohol do you have on a typical day you are drinking? 0 Filed at: 02/27/2023 2330   3b  FEMALE Any Age, or MALE 65+: How often do you have 4 or more drinks on one occassion? 0 Filed at: 02/27/2023 2330   Audit-C Score 0 Filed at: 02/27/2023 2330   EVELYN: How many times in the past year have you    Used an illegal drug or used a prescription medication for non-medical reasons? Never Filed at: 02/27/2023 2330                    Medical Decision Making  D6year-old female status post frequent falls, generalized weakness, frequent urinary tract infections currently with no specific complaints differential diagnosis includes infection, traumatic brain injury, ACS, arrhythmia, electrolyte abnormalities, dehydration, deconditioning, will obtain lab work, imaging will reevaluate    Amount and/or Complexity of Data Reviewed  Labs: ordered  Radiology: ordered            Disposition  Final diagnoses:   Generalized weakness   Frequent falls   Urinary tract infection     Time reflects when diagnosis was documented in both MDM as applicable and the Disposition within this note     Time User Action Codes Description Comment    2/28/2023 12:26 AM Kenrick Pierre Add [R53 1] Generalized weakness     2/28/2023 12:27 AM Kenrick Pierre Add [R29 6] Frequent falls     2/28/2023 12:32 AM Artis Oconnell Add [N39 0] Urinary tract infection       ED Disposition     ED Disposition   Admit    Condition   Stable    Date/Time   Tue Feb 28, 2023 12:42 AM    Comment   Case was discussed with PARAG and the patient's admission status was agreed to be Admission Status: inpatient status to the service of Dr Adilson Jacques   Follow-up Information    None         Patient's Medications   Discharge Prescriptions    No medications on file       No discharge procedures on file      PDMP Review     None          ED Provider  Electronically Signed by           Aga Tadeo DO  02/28/23 0801

## 2023-02-28 NOTE — ASSESSMENT & PLAN NOTE
· Presents from home due to generalized weakness with multiple falls over the past several days  Daily aspirin use  · No injuries noted on CT head and CT cervical spine  Patient denies injuries from the falls  · Found to have UTI infection  · possible etiology for increased weakness  · Patient also with admission to [facility] in December 2022, was discharged to [facility]  Per nephew patient has been doing well at his house since she was discharged from [facility] except for the last several days     · Fall precautions  · Consult PT/OT and case management

## 2023-03-01 LAB
GLUCOSE SERPL-MCNC: 109 MG/DL (ref 65–140)
GLUCOSE SERPL-MCNC: 166 MG/DL (ref 65–140)
GLUCOSE SERPL-MCNC: 187 MG/DL (ref 65–140)
GLUCOSE SERPL-MCNC: 99 MG/DL (ref 65–140)

## 2023-03-01 RX ADMIN — UMECLIDINIUM 1 PUFF: 62.5 AEROSOL, POWDER ORAL at 08:18

## 2023-03-01 RX ADMIN — CEFTRIAXONE 1000 MG: 1 INJECTION, SOLUTION INTRAVENOUS at 00:00

## 2023-03-01 RX ADMIN — INSULIN LISPRO 1 UNITS: 100 INJECTION, SOLUTION INTRAVENOUS; SUBCUTANEOUS at 12:00

## 2023-03-01 RX ADMIN — HEPARIN SODIUM 5000 UNITS: 5000 INJECTION INTRAVENOUS; SUBCUTANEOUS at 22:23

## 2023-03-01 RX ADMIN — LEVOTHYROXINE SODIUM 100 MCG: 100 TABLET ORAL at 05:36

## 2023-03-01 RX ADMIN — CEFTRIAXONE 1000 MG: 1 INJECTION, SOLUTION INTRAVENOUS at 22:24

## 2023-03-01 RX ADMIN — HEPARIN SODIUM 5000 UNITS: 5000 INJECTION INTRAVENOUS; SUBCUTANEOUS at 14:30

## 2023-03-01 RX ADMIN — ASPIRIN 81 MG: 81 TABLET, COATED ORAL at 08:16

## 2023-03-01 RX ADMIN — HEPARIN SODIUM 5000 UNITS: 5000 INJECTION INTRAVENOUS; SUBCUTANEOUS at 05:36

## 2023-03-01 RX ADMIN — INSULIN LISPRO 1 UNITS: 100 INJECTION, SOLUTION INTRAVENOUS; SUBCUTANEOUS at 17:48

## 2023-03-01 RX ADMIN — OXYBUTYNIN CHLORIDE 10 MG: 5 TABLET, EXTENDED RELEASE ORAL at 08:16

## 2023-03-01 NOTE — CASE MANAGEMENT
Case Management Discharge Planning Note    Patient name Celedonio Sever  Location /-32 MRN [id number]  : 1934 Date 3/1/2023       Current Admission Date: 2023  Current Admission Diagnosis:Generalized weakness   Patient Active Problem List    Diagnosis Date Noted   • UTI (urinary tract infection) 2023   • Generalized weakness 2023   • CKD (chronic kidney disease) stage 3, GFR 30-59 ml/min ([facility] [address] ) 2023   • Type 2 diabetes mellitus, without long-term current use of insulin ([facility] ) 2023   • Hypothyroidism 2023   • COPD (chronic obstructive pulmonary disease) ([facility] ) 2023      LOS (days): 1  Geometric Mean LOS (GMLOS) (days): 2 80  Days to GMLOS:1 2     OBJECTIVE:  Risk of Unplanned Readmission Score: 12 11         Current admission status: Inpatient   Preferred Pharmacy: No Pharmacies Listed  Primary Care Provider: Jerardo Rizzo MD    Primary Insurance: [employer] [facility]:     DISCHARGE DETAILS:  Met with patients DIL Cornell Ennis [phone] to discuss discharge plan  PT/OT recommending post acute rehab - patient and daughter in law declining SNF  As per Cornell Ennis, they are willing to have patient come and stay with them  Cornell Ennis is home during the day and patient would not be alone  [facility] to follow for SN/PT/OT  Cornell Ennis requesting hospital bed  Referral through [place]

## 2023-03-01 NOTE — PLAN OF CARE
Problem: Potential for Falls  Goal: Patient will remain free of falls  Description: INTERVENTIONS:  - Educate patient/family on patient safety including physical limitations  - Instruct patient to call for assistance with activity   - Consult OT/PT to assist with strengthening/mobility   - Keep Call bell within reach  - Keep bed low and locked with side rails adjusted as appropriate  - Keep care items and personal belongings within reach  - Initiate and maintain comfort rounds  - Make Fall Risk Sign visible to staff  - Offer Toileting every x Hours, in advance of need  - Initiate/Maintain xalarm  - Obtain necessary fall risk management equipment: x  - Apply yellow socks and bracelet for high fall risk patients  - Consider moving patient to room near nurses station  Outcome: Progressing     Problem: Prexisting or High Potential for Compromised Skin Integrity  Goal: Skin integrity is maintained or improved  Description: INTERVENTIONS:  - Identify patients at risk for skin breakdown  - Assess and monitor skin integrity  - Assess and monitor nutrition and hydration status  - Monitor labs   - Assess for incontinence   - Turn and reposition patient  - Assist with mobility/ambulation  - Relieve pressure over bony prominences  - Avoid friction and shearing  - Provide appropriate hygiene as needed including keeping skin clean and dry  - Evaluate need for skin moisturizer/barrier cream  - Collaborate with interdisciplinary team   - Patient/family teaching  - Consider wound care consult   Outcome: Progressing     Problem: MOBILITY - ADULT  Goal: Maintain or return to baseline ADL function  Description: INTERVENTIONS:  -  Assess patient's ability to carry out ADLs; assess patient's baseline for ADL function and identify physical deficits which impact ability to perform ADLs (bathing, care of mouth/teeth, toileting, grooming, dressing, etc )  - Assess/evaluate cause of self-care deficits   - Assess range of motion  - Assess patient's mobility; develop plan if impaired  - Assess patient's need for assistive devices and provide as appropriate  - Encourage maximum independence but intervene and supervise when necessary  - Involve family in performance of ADLs  - Assess for home care needs following discharge   - Consider OT consult to assist with ADL evaluation and planning for discharge  - Provide patient education as appropriate  Outcome: Progressing  Goal: Maintains/Returns to pre admission functional level  Description: INTERVENTIONS:  - Perform BMAT or MOVE assessment daily    - Set and communicate daily mobility goal to care team and patient/family/caregiver  - Collaborate with rehabilitation services on mobility goals if consulted  - Perform Range of Motion x times a day  - Reposition patient every x hours    - Dangle patient x times a day  - Stand patient x times a day  - Ambulate patient x times a day  - Out of bed to chair x times a day   - Out of bed for meals x times a day  - Out of bed for toileting  - Record patient progress and toleration of activity level   Outcome: Progressing   x

## 2023-03-01 NOTE — PLAN OF CARE
Problem: Potential for Falls  Goal: Patient will remain free of falls  Description: INTERVENTIONS:  - Educate patient/family on patient safety including physical limitations  - Instruct patient to call for assistance with activity   - Consult OT/PT to assist with strengthening/mobility   - Keep Call bell within reach  - Keep bed low and locked with side rails adjusted as appropriate  - Keep care items and personal belongings within reach  - Initiate and maintain comfort rounds  - Make Fall Risk Sign visible to staff  - Offer Toileting every x Hours, in advance of need  - Initiate/Maintain xalarm  - Obtain necessary fall risk management equipment: x  - Apply yellow socks and bracelet for high fall risk patients  - Consider moving patient to room near nurses station  Outcome: Progressing     Problem: Prexisting or High Potential for Compromised Skin Integrity  Goal: Skin integrity is maintained or improved  Description: INTERVENTIONS:  - Identify patients at risk for skin breakdown  - Assess and monitor skin integrity  - Assess and monitor nutrition and hydration status  - Monitor labs   - Assess for incontinence   - Turn and reposition patient  - Assist with mobility/ambulation  - Relieve pressure over bony prominences  - Avoid friction and shearing  - Provide appropriate hygiene as needed including keeping skin clean and dry  - Evaluate need for skin moisturizer/barrier cream  - Collaborate with interdisciplinary team   - Patient/family teaching  - Consider wound care consult   Outcome: Progressing     Problem: MOBILITY - ADULT  Goal: Maintain or return to baseline ADL function  Description: INTERVENTIONS:  -  Assess patient's ability to carry out ADLs; assess patient's baseline for ADL function and identify physical deficits which impact ability to perform ADLs (bathing, care of mouth/teeth, toileting, grooming, dressing, etc )  - Assess/evaluate cause of self-care deficits   - Assess range of motion  - Assess patient's mobility; develop plan if impaired  - Assess patient's need for assistive devices and provide as appropriate  - Encourage maximum independence but intervene and supervise when necessary  - Involve family in performance of ADLs  - Assess for home care needs following discharge   - Consider OT consult to assist with ADL evaluation and planning for discharge  - Provide patient education as appropriate  Outcome: Progressing  Goal: Maintains/Returns to pre admission functional level  Description: INTERVENTIONS:  - Perform BMAT or MOVE assessment daily    - Set and communicate daily mobility goal to care team and patient/family/caregiver  - Collaborate with rehabilitation services on mobility goals if consulted  - Perform Range of Motion x times a day  - Reposition patient every x hours    - Dangle patient x times a day  - Stand patient x times a day  - Ambulate patient x times a day  - Out of bed to chair x times a day   - Out of bed for meals x times a day  - Out of bed for toileting  - Record patient progress and toleration of activity level   Outcome: Progressing     Problem: PAIN - ADULT  Goal: Verbalizes/displays adequate comfort level or baseline comfort level  Description: Interventions:  - Encourage patient to monitor pain and request assistance  - Assess pain using appropriate pain scale  - Administer analgesics based on type and severity of pain and evaluate response  - Implement non-pharmacological measures as appropriate and evaluate response  - Consider cultural and social influences on pain and pain management  - Notify physician/advanced practitioner if interventions unsuccessful or patient reports new pain  Outcome: Progressing     Problem: INFECTION - ADULT  Goal: Absence or prevention of progression during hospitalization  Description: INTERVENTIONS:  - Assess and monitor for signs and symptoms of infection  - Monitor lab/diagnostic results  - Monitor all insertion sites, i e  indwelling lines, tubes, and drains  - Monitor endotracheal if appropriate and nasal secretions for changes in amount and color  - Nabb appropriate cooling/warming therapies per order  - Administer medications as ordered  - Instruct and encourage patient and family to use good hand hygiene technique  - Identify and instruct in appropriate isolation precautions for identified infection/condition  Outcome: Progressing     Problem: SAFETY ADULT  Goal: Patient will remain free of falls  Description: INTERVENTIONS:  - Educate patient/family on patient safety including physical limitations  - Instruct patient to call for assistance with activity   - Consult OT/PT to assist with strengthening/mobility   - Keep Call bell within reach  - Keep bed low and locked with side rails adjusted as appropriate  - Keep care items and personal belongings within reach  - Initiate and maintain comfort rounds  - Make Fall Risk Sign visible to staff  - Offer Toileting every x Hours, in advance of need  - Initiate/Maintain xalarm  - Obtain necessary fall risk management equipment: x  - Apply yellow socks and bracelet for high fall risk patients  - Consider moving patient to room near nurses station  Outcome: Progressing     Problem: DISCHARGE PLANNING  Goal: Discharge to home or other facility with appropriate resources  Description: INTERVENTIONS:  - Identify barriers to discharge w/patient and caregiver  - Arrange for needed discharge resources and transportation as appropriate  - Identify discharge learning needs (meds, wound care, etc )  - Arrange for interpretive services to assist at discharge as needed  - Refer to Case Management Department for coordinating discharge planning if the patient needs post-hospital services based on physician/advanced practitioner order or complex needs related to functional status, cognitive ability, or social support system  Outcome: Progressing     Problem: Knowledge Deficit  Goal: Patient/family/caregiver demonstrates understanding of disease process, treatment plan, medications, and discharge instructions  Description: Complete learning assessment and assess knowledge base  Interventions:  - Provide teaching at level of understanding  - Provide teaching via preferred learning methods  Outcome: Progressing     Problem: Nutrition/Hydration-ADULT  Goal: Nutrient/Hydration intake appropriate for improving, restoring or maintaining nutritional needs  Description: Monitor and assess patient's nutrition/hydration status for malnutrition  Collaborate with interdisciplinary team and initiate plan and interventions as ordered  Monitor patient's weight and dietary intake as ordered or per policy  Utilize nutrition screening tool and intervene as necessary  Determine patient's food preferences and provide high-protein, high-caloric foods as appropriate       INTERVENTIONS:  - Monitor oral intake, urinary output, labs, and treatment plans  - Assess nutrition and hydration status and recommend course of action  - Evaluate amount of meals eaten  - Assist patient with eating if necessary   - Allow adequate time for meals  - Recommend/ encourage appropriate diets, oral nutritional supplements, and vitamin/mineral supplements  - Order, calculate, and assess calorie counts as needed  - Recommend, monitor, and adjust tube feedings and TPN/PPN based on assessed needs  - Assess need for intravenous fluids  - Provide specific nutrition/hydration education as appropriate  - Include patient/family/caregiver in decisions related to nutrition  Outcome: Progressing     Problem: GENITOURINARY - ADULT  Goal: Maintains or returns to baseline urinary function  Description: INTERVENTIONS:  - Assess urinary function  - Encourage oral fluids to ensure adequate hydration if ordered  - Administer IV fluids as ordered to ensure adequate hydration  - Administer ordered medications as needed  - Offer frequent toileting  - Follow urinary retention protocol if ordered  Outcome: Progressing     Problem: METABOLIC, FLUID AND ELECTROLYTES - ADULT  Goal: Glucose maintained within target range  Description: INTERVENTIONS:  - Monitor Blood Glucose as ordered  - Assess for signs and symptoms of hyperglycemia and hypoglycemia  - Administer ordered medications to maintain glucose within target range  - Assess nutritional intake and initiate nutrition service referral as needed  Outcome: Progressing     Problem: SKIN/TISSUE INTEGRITY - ADULT  Goal: Skin Integrity remains intact(Skin Breakdown Prevention)  Description: Assess:  -Perform Nasir assessment every x  -Clean and moisturize skin every x  -Inspect skin when repositioning, toileting, and assisting with ADLS  -Assess under medical devices such as x every x  -Assess extremities for adequate circulation and sensation     Bed Management:  -Have minimal linens on bed & keep smooth, unwrinkled  -Change linens as needed when moist or perspiring  -Avoid sitting or lying in one position for more than x hours while in bed  -Keep HOB at [facility]     Toileting:  -Offer bedside commode  -Assess for incontinence every x  -Use incontinent care products after each incontinent episode such as x    Activity:  -Mobilize patient x times a day  -Encourage activity and walks on unit  -Encourage or provide ROM exercises   -Turn and reposition patient every xxxx Hours  -Use appropriate equipment to lift or move patient in bed  -Instruct/ Assist with weight shifting every x when out of bed in chair  -Consider limitation of chair time x hour intervals    Skin Care:  -Avoid use of baby powder, tape, friction and shearing, hot water or constrictive clothing  -Relieve pressure over bony prominences using x  -Do not massage red bony areas    Next Steps:  -Teach patient strategies to minimize risks such as x   -Consider consults to  interdisciplinary teams such as x  Outcome: Progressing     Problem: HEMATOLOGIC - ADULT  Goal: Maintains hematologic stability  Description: INTERVENTIONS  - Assess for signs and symptoms of bleeding or hemorrhage  - Monitor labs  - Administer supportive blood products/factors as ordered and appropriate  Outcome: Progressing     Problem: MUSCULOSKELETAL - ADULT  Goal: Maintain or return mobility to safest level of function  Description: INTERVENTIONS:  - Assess patient's ability to carry out ADLs; assess patient's baseline for ADL function and identify physical deficits which impact ability to perform ADLs (bathing, care of mouth/teeth, toileting, grooming, dressing, etc )  - Assess/evaluate cause of self-care deficits   - Assess range of motion  - Assess patient's mobility  - Assess patient's need for assistive devices and provide as appropriate  - Encourage maximum independence but intervene and supervise when necessary  - Involve family in performance of ADLs  - Assess for home care needs following discharge   - Consider OT consult to assist with ADL evaluation and planning for discharge  - Provide patient education as appropriate  Outcome: Progressing

## 2023-03-01 NOTE — ASSESSMENT & PLAN NOTE
Lab Results   Component Value Date    EGFR 30 02/28/2023    EGFR 27 02/27/2023    CREATININE 1 53 (H) 02/28/2023    CREATININE 1 64 (H) 02/27/2023   · Creatinine 1 64 on admission  · Baseline from care everywhere appears to be about 1 3-1 7  · Continue to monitor  At baseline

## 2023-03-01 NOTE — PROGRESS NOTES
[place]  Progress Note - Raine Door 1934, 80 y o  female MRN: [id number]  Unit/Bed#: -[facility] Encounter: [id number]  Primary Care Provider: Rosales Guaman MD   Date and time admitted to hospital: 2/27/2023 10:27 PM    COPD (chronic obstructive pulmonary disease) ([address] )  Assessment & Plan  · Not appear in COPD exacerbation at this time  · Continue home inhaler    Hypothyroidism  Assessment & Plan  · Continue levothyroxine 100 mcg daily    Type 2 diabetes mellitus, without long-term current use of insulin [facility])  Assessment & Plan  No results found for: HGBA1C    Recent Labs     02/28/23  0901 02/28/23  1342 02/28/23 2050 03/01/23  0706   POCGLU 143* 106 109 99       Blood Sugar Average: Last 72 hrs:  (P) 112 2   · Home regimen: Januvia daily  · Hold home oral hypoglycemic  · SSI    CKD (chronic kidney disease) stage 3, GFR 30-59 ml/min ([facility])  Assessment & Plan  Lab Results   Component Value Date    EGFR 30 02/28/2023    EGFR 27 02/27/2023    CREATININE 1 53 (H) 02/28/2023    CREATININE 1 64 (H) 02/27/2023   · Creatinine 1 64 on admission  · Baseline from care everywhere appears to be about 1 3-1 7  · Continue to monitor  At baseline  UTI (urinary tract infection)  Assessment & Plan  · No urinary complaints  · Urine  · UA: Large amount of leukocytes, positive nitrate  · Micro: 20-30 WBC, large amounts of bacteria  · Patient with admission at [facility] in December 2022 also with UTI/weakness  · Did not meet SIRS criteria  WBC 14 52  Vitals WNL  · Lactic and procalcitonin WNL  · Started on IV ceftriaxone, continue  · Urine cultures and blood cultures pending    * Generalized weakness  Assessment & Plan  · Presents from home due to generalized weakness with multiple falls over the past several days  Daily aspirin use  · No injuries noted on CT head and CT cervical spine    Patient denies injuries from the falls  · Found to have UTI infection  · possible etiology for increased weakness  · Patient also with admission to [facility] in 2022, was discharged to [facility]  Per nephew patient has been doing well at his house since she was discharged from [facility] except for the last several days  · Fall precautions  · Likely for STR after discharge  VTE  Prophylaxis:   Pharmacologic: in place  Mechanical VTE Prophylaxis in Place: Yes    Patient Centered Rounds: I have performed bedside rounds with nursing staff today  Discussions with Specialists or Other Care Team Provider: case management    Education and Discussions with Family / Patient: flores carey via telephone  Current Length of Stay: 1 day(s)    Current Patient Status: Inpatient        Code Status: Level 3 - DNAR and DNI    Discharge Plan: Pt will require continued inpatient hospitalization  Subjective:     Pt states feeling better  Denies fever, dysuria, or suprapubic pain    Patient is seen and examined at bedside  All other ROS are negative  Objective:     Vitals:   Temp (24hrs), Av 9 °F (36 6 °C), Min:97 4 °F (36 3 °C), Max:98 3 °F (36 8 °C)    Temp:  [97 4 °F (36 3 °C)-98 3 °F (36 8 °C)] 97 4 °F (36 3 °C)  HR:  [67-75] 67  Resp:  [12-16] 12  BP: (124-163)/(47-72) 124/47  SpO2:  [94 %-95 %] 94 %  Body mass index is 22 86 kg/m²  Input and Output Summary (last 24 hours): Intake/Output Summary (Last 24 hours) at 3/1/2023 0937  Last data filed at 3/1/2023 2015  Gross per 24 hour   Intake --   Output 1550 ml   Net -1550 ml       Physical Exam:       GEN: No acute distress, comfortable, elderly frail appearing    HEEENT: No JVD, PERRLA, no scleral icterus  RESP: Lungs clear to auscultation bilaterally  CV: RRR, +s1/s2   ABD: SOFT NON TENDER, POSITIVE BOWEL SOUNDS, NO DISTENTION  PSYCH: CALM  NEURO: Mentation baseline, NO FOCAL DEFICITS  SKIN: NO RASH  EXTREM: NO EDEMA    Additional Data:     Labs:    Results from last 7 days   Lab Units 23  0531   WBC Thousand/uL 13 48*   HEMOGLOBIN g/dL 10 0*   HEMATOCRIT % 31 5*   PLATELETS Thousands/uL 261   NEUTROS PCT % 67   LYMPHS PCT % 21   MONOS PCT % 9   EOS PCT % 2     Results from last 7 days   Lab Units 02/28/23  0531 02/27/23  2317   SODIUM mmol/L 140 139   POTASSIUM mmol/L 3 8 3 8   CHLORIDE mmol/L 106 105   CO2 mmol/L 27 26   BUN mg/dL 32* 36*   CREATININE mg/dL 1 53* 1 64*   ANION GAP mmol/L 7 8   CALCIUM mg/dL 9 3 9 4   ALBUMIN g/dL  --  3 7   TOTAL BILIRUBIN mg/dL  --  0 49   ALK PHOS U/L  --  79   ALT U/L  --  10   AST U/L  --  13   GLUCOSE RANDOM mg/dL 114 108     Results from last 7 days   Lab Units 02/27/23  2317   INR  0 93     Results from last 7 days   Lab Units 03/01/23  0706 02/28/23  2050 02/28/23  1342 02/28/23  0901 02/27/23  2225   POC GLUCOSE mg/dl 99 109 106 143* 104         Results from last 7 days   Lab Units 02/27/23  2317   LACTIC ACID mmol/L 0 6   PROCALCITONIN ng/ml 0 06       Lines/Drains:  Invasive Devices     Peripheral Intravenous Line  Duration           Peripheral IV 02/27/23 Right;Ventral (anterior) Forearm 1 day                Telemetry:        * I Have Reviewed All Lab Data Listed Above  Imaging:     Results for orders placed during the hospital encounter of 02/27/23    XR chest portable    Narrative  CHEST    INDICATION:   generalized weakness  Fall  COMPARISON:  None    EXAM PERFORMED/VIEWS:  XR CHEST PORTABLE      FINDINGS:    Cardiomediastinal silhouette appears unremarkable  The lungs are clear  No pneumothorax or pleural effusion  Skeleton normal for age  No acute displaced fractures  Impression  No acute cardiopulmonary disease  Workstation performed: [id number]    No results found for this or any previous visit  *I have reviewed all imaging reports listed above      Recent Cultures (last 7 days):     Results from last 7 days   Lab Units 02/27/23 2317   BLOOD CULTURE  No Growth at 24 hrs  No Growth at 24 hrs         Last 24 Hours Medication List:   Current Facility-Administered Medications   Medication Dose Route Frequency Provider Last Rate   • acetaminophen  650 mg Oral Q6H PRN Yao Ceron PA-C     • albuterol  1 puff Inhalation Q6H PRN Yao Ceron PA-C     • aspirin  81 mg Oral Daily Yao Ceron PA-C     • cefTRIAXone  1,000 mg Intravenous Q24H Yao Ceron PA-C 1,000 mg (03/01/23 0000)   • heparin (porcine)  5,000 Units Subcutaneous Q8H [facility] Sonia Langley PA-C     • insulin lispro  1-6 Units Subcutaneous TID AC Sonia Langley PA-C     • insulin lispro  1-6 Units Subcutaneous HS Sonia Langley PA-C     • levothyroxine  100 mcg Oral Early Morning Sonia Langley PA-C     • ondansetron  4 mg Intravenous Q6H PRN Yao Ceron PA-C     • oxybutynin  10 mg Oral Daily Yao Ceron PA-C     • umeclidinium  1 puff Inhalation Daily Yao Ceron PA-C          Today, Patient Was Seen By: Ellen Falcon MD    ** Please Note: Dictation voice to text software may have been used in the creation of this document   **

## 2023-03-01 NOTE — PHYSICAL THERAPY NOTE
Physical Therapy Evaluation:    2 forms of pt ID verified:name,birthdate and pt ID shay    Patient's Name: Marc Snider    Admitting Diagnosis  Nausea [R11 0]  Fatigue [R53 83]  Urinary tract infection [N39 0]  Generalized weakness [R53 1]  Frequent falls [R29 6]    Problem List  Patient Active Problem List   Diagnosis    UTI (urinary tract infection)    Generalized weakness    CKD (chronic kidney disease) stage 3, GFR 30-59 ml/min ([facility])    Type 2 diabetes mellitus, without long-term current use of insulin ([facility])    Hypothyroidism    COPD (chronic obstructive pulmonary disease) ([facility] [address] )       Past Medical History  Past Medical History:   Diagnosis Date    COPD (chronic obstructive pulmonary disease) ([facility] )     Diabetes mellitus ([facility] )     Disease of thyroid gland     Hypertension        Past Surgical History  History reviewed  No pertinent surgical history  03/01/23 1110   PT Last Visit   PT Visit Date 03/01/23   Note Type   Note type Evaluation   Pain Assessment   Pain Assessment Tool 0-10   Pain Score No Pain   Restrictions/Precautions   Other Precautions Cognitive; Chair Alarm; Bed Alarm;Multiple lines;Telemetry; Fall Risk;Hard of hearing  (extremely [place], pt hears out of L ear better  Pt reports being in [place] when asked ,ocation)   Home Living   Type of [address] [place] [address]; Able to live on main level with bedroom/bathroom; Performs ADLs on one level;Stairs to enter with rails  (4 CHERYL with single HR per pt)   Home Equipment Walker;Cane  (use of RW vs SPC for mobility PTA per pt)   Additional Comments pt lives with grandson in multilevel style home, first floor setup per pt with (+)CHERYL  Pt reports no recent falls  Pt is alone at times during the day when grandson works per pt  Needs A for IADLs, pt reports being (I) with mobility with use of RW vs SPC and (I) with ADLs   Prior Function   Level of [place] Independent with ADLs; Independent with functional mobility; Needs assistance with IADLS  (per pt)   Lives With Family; Other (Comment)  (lives with grandson, but pt reports alone during the day while grandson works)   Receives Help From Family   IADLs Family/Friend/Other provides meals  (pt reports grandson A with meal setup and meal prep when not home  Pt reports heating food items with use of microwave)   Falls in the last 6 months   (pt reports no falls)   General   Additional Pertinent History nausea, fatigue, UTI, frequent falls, generalized weakness   Family/Caregiver Present No   Cognition   Overall Cognitive Status Impaired   Arousal/Participation Cooperative   Attention Attends with cues to redirect   Orientation Level Oriented to person;Oriented to time;Disoriented to situation;Disoriented to place   Following Commands Follows one step commands with increased time or repetition  (2* slow movement and very [place])   Subjective   Subjective Pt supine in bed with inc restlessness  Pt reports "I feel weak"  Pt willing and agreeable to work with PT and to participate in therapy intervention  "I can use the bathroom"  RLE Assessment   RLE Assessment   (at least 3+/5 grossly throughout)   LLE Assessment   LLE Assessment   (at least 3+/5 grossly throughout)   Coordination   Movements are Fluid and Coordinated 0   Coordination and Movement Description ataxic and unsteady gait and movement pattern, forward flexed posture,dec BLE step length   Sensation WFL   Light Touch   RLE Light Touch Grossly intact   LLE Light Touch Grossly intact   Bed Mobility   Supine to Sit 3  Moderate assistance   Additional items Assist x 1;HOB elevated; Bedrails; Increased time required;Verbal cues;LE management   Transfers   Sit to Stand 4  Minimal assistance   Additional items Assist x 1;Bedrails; Increased time required;Verbal cues   Stand to Sit 4  Minimal assistance   Additional items Assist x 1; Armrests; Increased time required;Verbal cues   Toilet transfer 4  Minimal assistance   Additional items Assist x 1; Increased time required;Commode   Ambulation/Elevation   Gait pattern Narrow SUE; Forward Flexion; Shuffling; Inconsistent jose; Foward flexed; Short stride; Ataxia   Gait Assistance 4  Minimal assist   Additional items Assist x 1;Verbal cues; Tactile cues   Assistive Device Rolling walker   Distance 12 feet x2 with use of RW on tile surface; one seated rest break on BSC in BR area to void  Pt was dependent for donning and doffing brief, (I) for pericare   Balance   Static Sitting Fair +  (chair alarm intact prior to leaving pts room)   Dynamic Sitting Poor +   Static Standing Poor +   Dynamic Standing Poor +   Ambulatory Poor +   Endurance Deficit   Endurance Deficit Yes   Endurance Deficit Description weakness BLE, fatigue   Activity Tolerance   Activity Tolerance Patient limited by fatigue  (fair)   Nurse Made Aware yes   Assessment   Prognosis Guarded   Problem List Decreased strength;Decreased endurance; Impaired balance;Decreased mobility; Decreased cognition; Impaired judgement;Decreased safety awareness; Impaired hearing;Decreased skin integrity   Assessment Pt is a 81 yo female admitted to [facility] 2* UTI, generalized weakness, nausea, fatigue and frequent falls  Pt was recently DC from [facility]  Pt was at [facility] 12/2022 and was DC to [facility]  Pt returned home following rehab at [facility] to live with family  Pt lives in [address] [place] [address], first floor setup,4 CHERYL with single HR,use of RW vs SPC for mobility PTA, pt reports no falls but chart documents frequent falls  Pt is alone during the day while family works, needs A for IADLs, reports being (I) for ADLs and mobility with use of personal DME  Pt currently is not at functional mobility baseline, needs A for mobility with use of RW, dec cognition, very [place], ataxic and unsteady gait and movement pattern, masimo monitoring, multiple lines   Pt demonstrates minimal deficits during functional mobility and gait including dec endurance, dec balance, dec BLE strength, dec cognition (baseline?), ataxic and unsteady gait and movement pattern and needs min Ax1 for transfers and gait with use of RW, modAX1 for bed mobility  Pt would cont to benefit from skilled inpt PT services to maximize functional independence  Barriers to Discharge Decreased caregiver support; Inaccessible home environment  (CHERYL, alone during the day)   Goals   Patient Goals to go home   STG Expiration Date 03/10/23   Short Term Goal #1 in 7-10 days:  (1) Pt will be able to ambulate greater than 100 feet with use of RW on various surfaces needing S level of A in order to A pt to return to PLOF, (2) activity tolerance:45 mins/45mins, (3) pt will be able to perform sit to stand transfers needing S level of A to and from various surfaces consistently in order to return to PLOF, (4) pt will be able to perform BM needing min Ax1 to S level of A to A pt to return to PLOF, (5) (I) with BLE therapeutic ex HEP in various positions to A pt to inc balance,strength,mobility,endurance  (6) inc balance 1/2 grade in order to dec fall risk, (7) pt will be able to go up and down 4 steps needing min Ax1 in order to navigate CHERYL as able and as needed prior to D/C, (8) cont to provide pt and pt family education for safe D/C planning, (9) inc BLE strength 1/2 to 1 full grade in order to A pt to inc balance,strength,mobility,endurance   PT Treatment Day 0   Plan   Treatment/Interventions Functional transfer training;LE strengthening/ROM; Elevations; Therapeutic exercise; Endurance training;Patient/family training;Bed mobility; Equipment eval/education;Gait training;Spoke to nursing   PT Frequency 3-5x/wk   Recommendation   PT Discharge Recommendation Post acute rehabilitation services   Equipment Recommended Walker  (cont use of RW for mobility, pt owns RW at home for use)   Otilio 8 in Flat Bed Without Bedrails 2   Lying on Back to Sitting on Edge of Flat Bed Without Bedrails 2   Moving Bed to Chair 3   Standing Up From Chair Using Arms 3   Walk in Room 3   Climb 3-5 Stairs With Railing 2   Basic Mobility Inpatient Raw Score 15   Basic Mobility Standardized Score 36 97   Highest Level Of Mobility   -[facility] Goal 4: Move to chair/commode   -HLM Achieved 6: Walk 10 steps or more         @Natalia Barron, PT, DPT@

## 2023-03-01 NOTE — ASSESSMENT & PLAN NOTE
No results found for: HGBA1C    Recent Labs     02/28/23  0901 02/28/23  1342 02/28/23  2050 03/01/23  0706   POCGLU 143* 106 109 99       Blood Sugar Average: Last 72 hrs:  (P) 112 2   · Home regimen: Januvia daily  · Hold home oral hypoglycemic  · SSI No

## 2023-03-01 NOTE — CASE MANAGEMENT
Case Management Progress Note    Patient name Rachel Mancia  Location /-59 MRN [id number]  : 1934 Date 3/1/2023       LOS (days): 1  Geometric Mean LOS (GMLOS) (days): 2 80  Days to GMLOS:1 5        OBJECTIVE:        Current admission status: Inpatient  Preferred Pharmacy: No Pharmacies Listed  Primary Care Provider: Claudia Yeager MD    Primary Insurance: [facility]  Secondary Insurance:     PROGRESS NOTE:   [facility] for son Jaycee Ribeiro, re: discharge plan  Await callback  Referrals via Aidin to Dariela Dodd for SN/PT/OT and to Ras Garcia and  Home	[place]

## 2023-03-01 NOTE — UTILIZATION REVIEW
Initial Clinical Review    Admission: Date/Time/Statement:   Admission Orders (From admission, onward)     Ordered        02/28/23 0042  INPATIENT ADMISSION  Once                      Orders Placed This Encounter   Procedures   • INPATIENT ADMISSION     Standing Status:   Standing     Number of Occurrences:   1     Order Specific Question:   Level of Care     Answer:   Med Surg [16]     Order Specific Question:   Estimated length of stay     Answer:   More than 2 Midnights     Order Specific Question:   Certification     Answer:   I certify that inpatient services are medically necessary for this patient for a duration of greater than two midnights  See H&P and MD Progress Notes for additional information about the patient's course of treatment  ED Arrival Information     Expected   -    Arrival   2/27/2023 21:35    Acuity   Urgent            Means of arrival   Wheelchair    Escorted by   Family Member    Service   Hospitalist    Admission type   Emergency            Arrival complaint   Fatigue, Nausea           Chief Complaint   Patient presents with   • Weakness - Generalized     Patient has experienced   • Fall     Pt c/o of multple falls the last tow days  Initial Presentation: 80 y o  female from home to ED 2/27/23 and inpatient order placed 2/28/23 due to UTI/weakness  Presented due to  Weakness and frequent falls, 5 in last few days,  starting week of arrival   Chronic diarrhea   + fatigue  On exam alert  No focal deficits  UA + nitrite, large leukocytes  Bun 36, creatinine 1 64 with baseline of 1 3 - 1 7      Wbc 14 52   H&H 9 9/31 1 and baseline hgb of 9 -10  In the ED started on ceftriaxone  Plan is follow cultures, continue ceftriaxone  PT/OT  Hold home Januvia and start accuchecks with SSI  Date: 3/1/23   Day 2: feels better than admissions  On exam appears elderly and frail  Wbc 13 48  Bun 32, creatinine 1 53  Continue ceftriaxone, cultures pending       ED Triage Vitals Temperature Pulse Respirations Blood Pressure SpO2   02/27/23 2235 02/27/23 2231 02/27/23 2231 02/27/23 2231 02/27/23 2231   98 1 °F (36 7 °C) 84 18 (!) 184/80 98 %      Temp Source Heart Rate Source Patient Position - Orthostatic VS BP Location FiO2 (%)   02/28/23 1641 02/27/23 2231 02/27/23 2231 02/27/23 2231 --   Oral Monitor;Right Sitting Left arm       Pain Score       02/27/23 2231       No Pain          Wt Readings from Last 1 Encounters:   02/28/23 54 9 kg (121 lb)     Additional Vital Signs:   03/01/23 0700 97 4 °F (36 3 °C) Abnormal  67 12 124/47 Abnormal  73 94 % -- Lying   02/28/23 2207 98 3 °F (36 8 °C) 71 -- 155/65 95 94 % None (Room air) Lying   02/28/23 1700 -- -- -- -- -- 94 % None (Room air) --   02/28/23 1641 97 9 °F (36 6 °C) 75 -- 150/69 96 95 % None (Room air) Lying   02/28/23 1100 -- 68 16 163/72 103 94 % -- --   02/28/23 0900 -- 75 -- 194/77 Abnormal  111 95 % -- --   02/28/23 0800 -- 71 -- 163/69 99 93 % -- --   02/28/23 0700 -- 87 18 166/73 105 93 % -- --   02/28/23 0530 -- 72 18 180/80 Abnormal  115 94 % None (Room air) Lying   02/28/23 0445 -- 75 -- -- -- 95 % -- --   02/28/23 0300 -- 84 20 170/74 107 92 % None (Room air) --   02/28/23 0200 -- 78 20 197/81 Abnormal  116 92 % -- --   02/28/23 0100 -- 72 20 171/67 Abnormal  97 93 % None (Room air) Lying   02/27/23 2316 -- 74 20 160/73 105 93 % None (Room air)      Pertinent Labs/Diagnostic Test Results:   CT head without contrast   Final Result by Mariano Navarro MD (02/27 0672)      No acute intracranial abnormality  Marked chronic small vessel ischemic changes  Mild to moderate central greater than cortical volume loss  Workstation performed: [id number]         CT spine cervical without contrast   Final Result by Mariano Navarro MD (02/28 0006)      No cervical spine fracture or traumatic malalignment                     Workstation performed: [id number]         XR chest portable   ED Interpretation by Danial Knight DO Carl (02/27 2330)   This study was ordered and independently reviewed by me    No acute findings noted         Final Result by Dulce Tijerina MD (02/28 9814)      No acute cardiopulmonary disease                    Workstation performed: [id number]           2/27/23 ecg Normal sinus rhythm  Normal ECG  No previous ECGs available    Results from last 7 days   Lab Units 02/27/23 2317   SARS-COV-2  Negative     Results from last 7 days   Lab Units 02/28/23  0531 02/27/23 2317   WBC Thousand/uL 13 48* 14 52*   HEMOGLOBIN g/dL 10 0* 9 9*   HEMATOCRIT % 31 5* 31 1*   PLATELETS Thousands/uL 261 278   NEUTROS ABS Thousands/µL 8 94* 10 08*     Results from last 7 days   Lab Units 02/28/23  0531 02/27/23 2317   SODIUM mmol/L 140 139   POTASSIUM mmol/L 3 8 3 8   CHLORIDE mmol/L 106 105   CO2 mmol/L 27 26   ANION GAP mmol/L 7 8   BUN mg/dL 32* 36*   CREATININE mg/dL 1 53* 1 64*   EGFR ml/min/1 73sq m 30 27   CALCIUM mg/dL 9 3 9 4     Results from last 7 days   Lab Units 02/27/23 2317   AST U/L 13   ALT U/L 10   ALK PHOS U/L 79   TOTAL PROTEIN g/dL 8 0   ALBUMIN g/dL 3 7   TOTAL BILIRUBIN mg/dL 0 49     Results from last 7 days   Lab Units 03/01/23  0706 02/28/23  2050 02/28/23  1342 02/28/23  0901 02/27/23  2225   POC GLUCOSE mg/dl 99 109 106 143* 104     Results from last 7 days   Lab Units 02/28/23  0531 02/27/23  2317   GLUCOSE RANDOM mg/dL 114 108     Results from last 7 days   Lab Units 02/28/23  0148 02/27/23  2317   HS TNI 0HR ng/L  --  12   HS TNI 2HR ng/L 12  --    HSTNI D2 ng/L 0  --      Results from last 7 days   Lab Units 02/27/23 2317   PROTIME seconds 13 2   INR  0 93   PTT seconds 27     Results from last 7 days   Lab Units 02/27/23  2317   PROCALCITONIN ng/ml 0 06     Results from last 7 days   Lab Units 02/27/23  2317   LACTIC ACID mmol/L 0 6     Results from last 7 days   Lab Units 02/28/23  0003   CLARITY UA  Slightly Cloudy   COLOR UA  Light Yellow   SPEC GRAV UA  1 020   PH UA  6 0   GLUCOSE UA mg/dl Negative   KETONES UA mg/dl Negative   BLOOD UA  Small*   PROTEIN UA mg/dl 30 (1+)*   NITRITE UA  Positive*   BILIRUBIN UA  Negative   UROBILINOGEN UA (BE) mg/dl <2 0   LEUKOCYTES UA  Large*   WBC UA /hpf 20-30*   RBC UA /hpf 0-1   BACTERIA UA /hpf Innumerable*   EPITHELIAL CELLS WET PREP /hpf Occasional     Results from last 7 days   Lab Units 02/27/23  2317   INFLUENZA A PCR  Negative   INFLUENZA B PCR  Negative   RSV PCR  Negative     Results from last 7 days   Lab Units 02/28/23  0003 02/27/23  2317   BLOOD CULTURE   --  No Growth at 24 hrs  No Growth at 24 hrs     URINE CULTURE  >100,000 cfu/ml Gram Negative [place]*  --        ED Treatment:   Medication Administration from 02/27/2023 2134 to 02/28/2023 1629       Date/Time Order Dose Route Action Comments     02/28/2023 0042 EST cefTRIAXone (ROCEPHIN) IVPB (premix in dextrose) 1,000 mg 50 mL 1,000 mg Intravenous New Bag --     02/28/2023 0829 EST aspirin (ECOTRIN LOW STRENGTH) EC tablet 81 mg 81 mg Oral Given --     02/28/2023 0523 EST levothyroxine tablet 100 mcg 100 mcg Oral Given --     02/28/2023 0829 EST oxybutynin (DITROPAN-XL) 24 hr tablet 10 mg 10 mg Oral Given --     02/28/2023 0523 EST heparin (porcine) subcutaneous injection 5,000 Units 5,000 Units Subcutaneous Given --        Past Medical History:   Diagnosis Date   • COPD (chronic obstructive pulmonary disease) ([facility] [address] )    • Diabetes mellitus (HCC)    • Disease of thyroid gland    • Hypertension      Present on Admission:  **None**      Admitting Diagnosis: Nausea [R11 0]  Fatigue [R53 83]  Urinary tract infection [N39 0]  Generalized weakness [R53 1]  Frequent falls [R29 6]  Age/Sex: 80 y o  female  Admission Orders:  Scheduled Medications:  aspirin, 81 mg, Oral, Daily  cefTRIAXone, 1,000 mg, Intravenous, Q24H  heparin (porcine), 5,000 Units, Subcutaneous, Q8H PIERRE  insulin lispro, 1-6 Units, Subcutaneous, TID AC  insulin lispro, 1-6 Units, Subcutaneous, HS  levothyroxine, 100 mcg, Oral, Early Morning  oxybutynin, 10 mg, Oral, Daily  umeclidinium, 1 puff, Inhalation, Daily    Continuous IV Infusions: none      PRN Meds:  acetaminophen, 650 mg, Oral, Q6H PRN  albuterol, 1 puff, Inhalation, Q6H PRN  ondansetron, 4 mg, Intravenous, Q6H PRN x 1 2/28/23     PT/OT    Network Utilization Review Department  ATTENTION: Please call with any questions or concerns to [phone] and carefully listen to the prompts so that you are directed to the right person  All voicemails are confidential   Katie Andrea all requests for admission clinical reviews, approved or denied determinations and any other requests to dedicated fax number below belonging to the campus where the patient is receiving treatment   List of dedicated fax numbers for the Facilities:  [address] DENIALS (Administrative/Medical Necessity) [phone]   [address] (Maternity/NICU/Pediatrics) [phone]   5 Annabelle Patel [phone]   [facility] [place] [phone]   [address] [address] [facility] [phone]   [address] [place] [address] 815 [facility] [phone]

## 2023-03-01 NOTE — PLAN OF CARE
Problem: PHYSICAL THERAPY ADULT  Goal: Performs mobility at highest level of function for planned discharge setting  See evaluation for individualized goals  Description: Treatment/Interventions: Functional transfer training, LE strengthening/ROM, Elevations, Therapeutic exercise, Endurance training, Patient/family training, Bed mobility, Equipment eval/education, Gait training, Spoke to nursing  Equipment Recommended: Walker (cont use of RW for mobility, pt owns RW at home for use)       See flowsheet documentation for full assessment, interventions and recommendations  Note: Prognosis: Guarded  Problem List: Decreased strength, Decreased endurance, Impaired balance, Decreased mobility, Decreased cognition, Impaired judgement, Decreased safety awareness, Impaired hearing, Decreased skin integrity  Assessment: Pt is a 81 yo female admitted to [facility] 2* UTI, generalized weakness, nausea, fatigue and frequent falls  Pt was recently DC from [facility]  Pt was at [facility] 12/2022 and was DC to [facility]  Pt returned home following rehab at [address] [place] [address] to live with family  Pt lives in [address] [place] [address], first floor setup,[facility] with single HR,use of RW vs SPC for mobility PTA, pt reports no falls but chart documents frequent falls  Pt is alone during the day while family works, needs A for IADLs, reports being (I) for ADLs and mobility with use of personal DME  Pt currently is not at functional mobility baseline, needs A for mobility with use of RW, dec cognition, very [place], ataxic and unsteady gait and movement pattern, masimo monitoring, multiple lines  Pt demonstrates minimal deficits during functional mobility and gait including dec endurance, dec balance, dec BLE strength, dec cognition (baseline?), ataxic and unsteady gait and movement pattern and needs min Ax1 for transfers and gait with use of RW, modAX1 for bed mobility  Pt would cont to benefit from skilled inpt PT services to maximize functional independence    Barriers to Discharge: Decreased caregiver support, Inaccessible home environment (CHERYL, alone during the day)     PT Discharge Recommendation: Post acute rehabilitation services    See flowsheet documentation for full assessment

## 2023-03-01 NOTE — CASE MANAGEMENT
Case Management Progress Note    Patient name Raine Door  Location /-08 MRN [id number]  : 1934 Date 3/1/2023       LOS (days): 1  Geometric Mean LOS (GMLOS) (days): 2 80  Days to GMLOS:1 1        OBJECTIVE:        Current admission status: Inpatient  Preferred Pharmacy: No Pharmacies Listed  Primary Care Provider: Rosales Guaman MD    Primary Insurance: [address] [place] [address]  Secondary Insurance:     PROGRESS NOTE:  Patient approved for hospital bed  Notified patients Griselda Douglas and she is okay for delivery for am tomorrow

## 2023-03-01 NOTE — PROGRESS NOTES
-- Patient:  -- MRN: [id number]  -- Aidin Request ID: [id number]  -- Level of care reserved: [facility]  -- Partner Reserved: [facility] [facility] [address], [phone]  -- Clinical needs requested:  -- Geography searched: [phone]  -- Start of Service:  -- Request sent: 8:00am EST on 3/1/2023 by Rachana Desir  -- Partner reserved: 4:35pm EST on 3/1/2023 by Rachana Desir  -- Choice list shared:

## 2023-03-01 NOTE — CASE MANAGEMENT
Case Management Assessment & Discharge Planning Note    Patient name Jose Enrique Frias  Location /-52 MRN [id number]  : 1934 Date 3/1/2023       Current Admission Date: 2023  Current Admission Diagnosis:Generalized weakness   Patient Active Problem List    Diagnosis Date Noted   • UTI (urinary tract infection) 2023   • Generalized weakness 2023   • CKD (chronic kidney disease) stage 3, GFR 30-59 ml/min ([facility] ) 2023   • Type 2 diabetes mellitus, without long-term current use of insulin ([facility] ) 2023   • Hypothyroidism 2023   • COPD (chronic obstructive pulmonary disease) ([facility] ) 2023      LOS (days): 1  Geometric Mean LOS (GMLOS) (days): 2 80  Days to GMLOS:1 5     OBJECTIVE:    Risk of Unplanned Readmission Score: 12 05         Current admission status: Inpatient       Preferred Pharmacy: No Pharmacies Listed  Primary Care Provider: Delia Doan MD    Primary Insurance: [facility]  Secondary Insurance:     ASSESSMENT:  Nadiya 26 Proxies    There are no active Health Care Proxies on file  Advance Directives  Does patient have a Health Care POA?: Yes  Does patient have Advance Directives?: Yes  Advance Directives: Living will, Power of  for health care  Primary Contact: Ave Zavala - son         Readmission Root Cause  30 Day Readmission: No    Patient Information  Admitted from[de-identified] Home  Mental Status: Alert [facility])  During Assessment patient was accompanied by: Not accompanied during assessment  Assessment information provided by[de-identified] Patient  Primary Caregiver: Self  Support Systems: Son, Family members  [place] of Residence:  [facility] do you live in?: [facility] [address] entry access options   Select all that apply : Stairs  Number of steps to enter home : 4  Do the steps have railings?: Yes  Type of Current Residence: [address] [place] [address] home  Upon entering residence, is there a bedroom on the main floor (no further steps)?: Yes  Upon entering residence, is there a bathroom on the main floor (no further steps)?: Yes  In the last 12 months, was there a time when you were not able to pay the mortgage or rent on time?: No  In the last 12 months, how many places have you lived?: 1  In the last 12 months, was there a time when you did not have a steady place to sleep or slept in a shelter (including now)?: No  Homeless/housing insecurity resource given?: N/A  Living Arrangements: Other (Comment) (grandson)  Is patient a ?: No    Activities of Daily Living Prior to Admission  Functional Status: Independent  Completes ADLs independently?: Yes  Ambulates independently?: Yes  Does patient use assisted devices?: Yes  Assisted Devices (DME) used: Straight Tiffany Aus  Does patient currently own DME?: Yes  What DME does the patient currently own?: Straight Tiffany Aus, Wheelchair  Does patient have a history of Outpatient Therapy (PT/OT)?: Yes  Does the patient have a history of Short-Term Rehab?: Yes ([address] [place] [address])  Does patient have a history of HHC?: Yes Echo Jones)  Does patient currently have [facility]?: Yes    Current Home Health Care  Type of Current Home Care Services: Home PT, Home OT, Nurse visit  [address] [place] [address][de-identified] [address] [place] [address] Provider[de-identified] PCP    Patient Information Continued  Income Source: SSI/SSD  Does patient have prescription coverage?: Yes  Within the past 12 months, you worried that your food would run out before you got the money to buy more : Never true  Within the past 12 months, the food you bought just didn't last and you didn't have money to get more : Never true  Food insecurity resource given?: N/A  Does patient receive dialysis treatments?: No  Does patient have a history of substance abuse?: No  Does patient have a history of Mental Health Diagnosis?: No         Means of Transportation  Means of Transport to [facility][de-identified] Family transport  In the past 12 months, has lack of transportation kept you from medical appointments or from getting medications?: No  In the past 12 months, has lack of transportation kept you from meetings, work, or from getting things needed for daily living?: No  Was application for public transport provided?: N/A        DISCHARGE DETAILS:    Discharge planning discussed with[de-identified] patient and son  Freedom of Choice: Yes  Comments - Freedom of Choice: PT/OT recommending post acute rehab  Patient does not want to go to rehab  CM contacted family/caregiver?: Yes  Were Treatment Team discharge recommendations reviewed with patient/caregiver?: Yes  Did patient/caregiver verbalize understanding of patient care needs?: Yes  Were patient/caregiver advised of the risks associated with not following Treatment Team discharge recommendations?: Yes    Contacts  Patient Contacts: Joselito Bruner - son  Relationship to Patient[de-identified] Family  Contact Method: Phone  Phone Number: [phone]  Reason/Outcome: Continuity of Care, Emergency Contact, Discharge [address]         Is the patient interested in Rajeevu Roberta at discharge?: Yes  [address] requested[de-identified] Nursing, Occupational Therapy, Physical [address] Name[de-identified] [facility] Provider[de-identified] PCP  Home Health Services Needed[de-identified] Evaluate Functional Status and Safety, Gait/ADL Training, Strengthening/Theraputic Exercises to Improve Function  Homebound Criteria Met[de-identified] Requires the Assistance of Another Person for Safe Ambulation or to Leave the Home, Uses an Assist Device (i e  cane, walker, etc)  Supporting Clincal Findings[de-identified] Fatigues Easliy in [employer], Limited Endurance    DME Referral Provided  Referral made for DME?: No              Treatment Team Recommendation: Short Term Rehab          IMM Given (Date):: 03/01/23  IMM Given to[de-identified] Patient     Additional Comments: Patient lives in a [address] [facility] with 4 CHERYL with grandson Stefanie Torres    She is independent adl's, uses cane/RW to ambulate  She is current with [facility]  PT/OT recommending post acute rehab  Patient prefers to return home with [facility] following  Referral to [facility] via [facility]  Family to transport home

## 2023-03-01 NOTE — ASSESSMENT & PLAN NOTE
· Presents from home due to generalized weakness with multiple falls over the past several days  Daily aspirin use  · No injuries noted on CT head and CT cervical spine  Patient denies injuries from the falls  · Found to have UTI infection  · possible etiology for increased weakness  · Patient also with admission to [facility] in December 2022, was discharged to [facility]  Per nephew patient has been doing well at his house since she was discharged from [facility] except for the last several days  · Fall precautions  · Likely for STR after discharge

## 2023-03-02 VITALS
SYSTOLIC BLOOD PRESSURE: 136 MMHG | BODY MASS INDEX: 22.84 KG/M2 | TEMPERATURE: 97.8 F | RESPIRATION RATE: 16 BRPM | HEART RATE: 67 BPM | DIASTOLIC BLOOD PRESSURE: 53 MMHG | HEIGHT: 61 IN | WEIGHT: 121 LBS | OXYGEN SATURATION: 92 %

## 2023-03-02 LAB
ANION GAP SERPL CALCULATED.3IONS-SCNC: 7 MMOL/L (ref 4–13)
BACTERIA UR CULT: ABNORMAL
BACTERIA UR CULT: ABNORMAL
BASOPHILS # BLD AUTO: 0.06 THOUSANDS/ÂΜL (ref 0–0.1)
BASOPHILS NFR BLD AUTO: 0 % (ref 0–1)
BUN SERPL-MCNC: 33 MG/DL (ref 5–25)
CALCIUM SERPL-MCNC: 9.2 MG/DL (ref 8.4–10.2)
CHLORIDE SERPL-SCNC: 104 MMOL/L (ref 96–108)
CO2 SERPL-SCNC: 28 MMOL/L (ref 21–32)
CREAT SERPL-MCNC: 1.74 MG/DL (ref 0.6–1.3)
EOSINOPHIL # BLD AUTO: 0.39 THOUSAND/ÂΜL (ref 0–0.61)
EOSINOPHIL NFR BLD AUTO: 3 % (ref 0–6)
ERYTHROCYTE [DISTWIDTH] IN BLOOD BY AUTOMATED COUNT: 14.1 % (ref 11.6–15.1)
GFR SERPL CREATININE-BSD FRML MDRD: 25 ML/MIN/1.73SQ M
GLUCOSE SERPL-MCNC: 107 MG/DL (ref 65–140)
GLUCOSE SERPL-MCNC: 111 MG/DL (ref 65–140)
GLUCOSE SERPL-MCNC: 125 MG/DL (ref 65–140)
HCT VFR BLD AUTO: 32.8 % (ref 34.8–46.1)
HGB BLD-MCNC: 10.5 G/DL (ref 11.5–15.4)
IMM GRANULOCYTES # BLD AUTO: 0.07 THOUSAND/UL (ref 0–0.2)
IMM GRANULOCYTES NFR BLD AUTO: 1 % (ref 0–2)
LYMPHOCYTES # BLD AUTO: 4.48 THOUSANDS/ÂΜL (ref 0.6–4.47)
LYMPHOCYTES NFR BLD AUTO: 32 % (ref 14–44)
MCH RBC QN AUTO: 30.4 PG (ref 26.8–34.3)
MCHC RBC AUTO-ENTMCNC: 32 G/DL (ref 31.4–37.4)
MCV RBC AUTO: 95 FL (ref 82–98)
MONOCYTES # BLD AUTO: 1.29 THOUSAND/ÂΜL (ref 0.17–1.22)
MONOCYTES NFR BLD AUTO: 9 % (ref 4–12)
NEUTROPHILS # BLD AUTO: 7.87 THOUSANDS/ÂΜL (ref 1.85–7.62)
NEUTS SEG NFR BLD AUTO: 55 % (ref 43–75)
NRBC BLD AUTO-RTO: 0 /100 WBCS
PLATELET # BLD AUTO: 282 THOUSANDS/UL (ref 149–390)
PMV BLD AUTO: 11 FL (ref 8.9–12.7)
POTASSIUM SERPL-SCNC: 4.2 MMOL/L (ref 3.5–5.3)
RBC # BLD AUTO: 3.45 MILLION/UL (ref 3.81–5.12)
SODIUM SERPL-SCNC: 139 MMOL/L (ref 135–147)
WBC # BLD AUTO: 14.16 THOUSAND/UL (ref 4.31–10.16)

## 2023-03-02 RX ORDER — CEPHALEXIN 500 MG/1
500 CAPSULE ORAL EVERY 12 HOURS SCHEDULED
Qty: 4 CAPSULE | Refills: 0 | Status: SHIPPED | OUTPATIENT
Start: 2023-03-02 | End: 2023-03-04

## 2023-03-02 RX ADMIN — HEPARIN SODIUM 5000 UNITS: 5000 INJECTION INTRAVENOUS; SUBCUTANEOUS at 06:03

## 2023-03-02 RX ADMIN — ASPIRIN 81 MG: 81 TABLET, COATED ORAL at 09:02

## 2023-03-02 RX ADMIN — LEVOTHYROXINE SODIUM 100 MCG: 100 TABLET ORAL at 06:03

## 2023-03-02 RX ADMIN — OXYBUTYNIN CHLORIDE 10 MG: 5 TABLET, EXTENDED RELEASE ORAL at 09:02

## 2023-03-02 RX ADMIN — UMECLIDINIUM 1 PUFF: 62.5 AEROSOL, POWDER ORAL at 09:06

## 2023-03-02 NOTE — PLAN OF CARE
Problem: Potential for Falls  Goal: Patient will remain free of falls  Description: INTERVENTIONS:  - Educate patient/family on patient safety including physical limitations  - Instruct patient to call for assistance with activity   - Consult OT/PT to assist with strengthening/mobility   - Keep Call bell within reach  - Keep bed low and locked with side rails adjusted as appropriate  - Keep care items and personal belongings within reach  - Initiate and maintain comfort rounds  - Make Fall Risk Sign visible to staff  - Offer Toileting every x Hours, in advance of need  - Initiate/Maintain xalarm  - Obtain necessary fall risk management equipment: x  - Apply yellow socks and bracelet for high fall risk patients  - Consider moving patient to room near nurses station  Outcome: Progressing     Problem: Prexisting or High Potential for Compromised Skin Integrity  Goal: Skin integrity is maintained or improved  Description: INTERVENTIONS:  - Identify patients at risk for skin breakdown  - Assess and monitor skin integrity  - Assess and monitor nutrition and hydration status  - Monitor labs   - Assess for incontinence   - Turn and reposition patient  - Assist with mobility/ambulation  - Relieve pressure over bony prominences  - Avoid friction and shearing  - Provide appropriate hygiene as needed including keeping skin clean and dry  - Evaluate need for skin moisturizer/barrier cream  - Collaborate with interdisciplinary team   - Patient/family teaching  - Consider wound care consult   Outcome: Progressing     Problem: MOBILITY - ADULT  Goal: Maintain or return to baseline ADL function  Description: INTERVENTIONS:  -  Assess patient's ability to carry out ADLs; assess patient's baseline for ADL function and identify physical deficits which impact ability to perform ADLs (bathing, care of mouth/teeth, toileting, grooming, dressing, etc )  - Assess/evaluate cause of self-care deficits   - Assess range of motion  - Assess patient's mobility; develop plan if impaired  - Assess patient's need for assistive devices and provide as appropriate  - Encourage maximum independence but intervene and supervise when necessary  - Involve family in performance of ADLs  - Assess for home care needs following discharge   - Consider OT consult to assist with ADL evaluation and planning for discharge  - Provide patient education as appropriate  Outcome: Progressing  Goal: Maintains/Returns to pre admission functional level  Description: INTERVENTIONS:  - Perform BMAT or MOVE assessment daily    - Set and communicate daily mobility goal to care team and patient/family/caregiver  - Collaborate with rehabilitation services on mobility goals if consulted  - Perform Range of Motion x times a day  - Reposition patient every x hours    - Dangle patient x times a day  - Stand patient x times a day  - Ambulate patient x times a day  - Out of bed to chair x times a day   - Out of bed for meals x times a day  - Out of bed for toileting  - Record patient progress and toleration of activity level   Outcome: Progressing     Problem: PAIN - ADULT  Goal: Verbalizes/displays adequate comfort level or baseline comfort level  Description: Interventions:  - Encourage patient to monitor pain and request assistance  - Assess pain using appropriate pain scale  - Administer analgesics based on type and severity of pain and evaluate response  - Implement non-pharmacological measures as appropriate and evaluate response  - Consider cultural and social influences on pain and pain management  - Notify physician/advanced practitioner if interventions unsuccessful or patient reports new pain  Outcome: Progressing     Problem: INFECTION - ADULT  Goal: Absence or prevention of progression during hospitalization  Description: INTERVENTIONS:  - Assess and monitor for signs and symptoms of infection  - Monitor lab/diagnostic results  - Monitor all insertion sites, i e  indwelling lines, tubes, and drains  - Monitor endotracheal if appropriate and nasal secretions for changes in amount and color  - Independence appropriate cooling/warming therapies per order  - Administer medications as ordered  - Instruct and encourage patient and family to use good hand hygiene technique  - Identify and instruct in appropriate isolation precautions for identified infection/condition  Outcome: Progressing     Problem: SAFETY ADULT  Goal: Patient will remain free of falls  Description: INTERVENTIONS:  - Educate patient/family on patient safety including physical limitations  - Instruct patient to call for assistance with activity   - Consult OT/PT to assist with strengthening/mobility   - Keep Call bell within reach  - Keep bed low and locked with side rails adjusted as appropriate  - Keep care items and personal belongings within reach  - Initiate and maintain comfort rounds  - Make Fall Risk Sign visible to staff  - Offer Toileting every x Hours, in advance of need  - Initiate/Maintain xalarm  - Obtain necessary fall risk management equipment: x  - Apply yellow socks and bracelet for high fall risk patients  - Consider moving patient to room near nurses station  Outcome: Progressing     Problem: DISCHARGE PLANNING  Goal: Discharge to home or other facility with appropriate resources  Description: INTERVENTIONS:  - Identify barriers to discharge w/patient and caregiver  - Arrange for needed discharge resources and transportation as appropriate  - Identify discharge learning needs (meds, wound care, etc )  - Arrange for interpretive services to assist at discharge as needed  - Refer to Case Management Department for coordinating discharge planning if the patient needs post-hospital services based on physician/advanced practitioner order or complex needs related to functional status, cognitive ability, or social support system  Outcome: Progressing     Problem: Knowledge Deficit  Goal: Patient/family/caregiver demonstrates understanding of disease process, treatment plan, medications, and discharge instructions  Description: Complete learning assessment and assess knowledge base  Interventions:  - Provide teaching at level of understanding  - Provide teaching via preferred learning methods  Outcome: Progressing     Problem: Nutrition/Hydration-ADULT  Goal: Nutrient/Hydration intake appropriate for improving, restoring or maintaining nutritional needs  Description: Monitor and assess patient's nutrition/hydration status for malnutrition  Collaborate with interdisciplinary team and initiate plan and interventions as ordered  Monitor patient's weight and dietary intake as ordered or per policy  Utilize nutrition screening tool and intervene as necessary  Determine patient's food preferences and provide high-protein, high-caloric foods as appropriate       INTERVENTIONS:  - Monitor oral intake, urinary output, labs, and treatment plans  - Assess nutrition and hydration status and recommend course of action  - Evaluate amount of meals eaten  - Assist patient with eating if necessary   - Allow adequate time for meals  - Recommend/ encourage appropriate diets, oral nutritional supplements, and vitamin/mineral supplements  - Order, calculate, and assess calorie counts as needed  - Recommend, monitor, and adjust tube feedings and TPN/PPN based on assessed needs  - Assess need for intravenous fluids  - Provide specific nutrition/hydration education as appropriate  - Include patient/family/caregiver in decisions related to nutrition  Outcome: Progressing     Problem: GENITOURINARY - ADULT  Goal: Maintains or returns to baseline urinary function  Description: INTERVENTIONS:  - Assess urinary function  - Encourage oral fluids to ensure adequate hydration if ordered  - Administer IV fluids as ordered to ensure adequate hydration  - Administer ordered medications as needed  - Offer frequent toileting  - Follow urinary retention protocol if ordered  Outcome: Progressing     Problem: METABOLIC, FLUID AND ELECTROLYTES - ADULT  Goal: Glucose maintained within target range  Description: INTERVENTIONS:  - Monitor Blood Glucose as ordered  - Assess for signs and symptoms of hyperglycemia and hypoglycemia  - Administer ordered medications to maintain glucose within target range  - Assess nutritional intake and initiate nutrition service referral as needed  Outcome: Progressing     Problem: SKIN/TISSUE INTEGRITY - ADULT  Goal: Skin Integrity remains intact(Skin Breakdown Prevention)  Description: Assess:  -Perform Nasir assessment every x  -Clean and moisturize skin every x  -Inspect skin when repositioning, toileting, and assisting with ADLS  -Assess under medical devices such as x every x  -Assess extremities for adequate circulation and sensation     Bed Management:  -Have minimal linens on bed & keep smooth, unwrinkled  -Change linens as needed when moist or perspiring  -Avoid sitting or lying in one position for more than x hours while in bed  -Keep [facility] at [facility]     Toileting:  -Offer bedside commode  -Assess for incontinence every x  -Use incontinent care products after each incontinent episode such as x    Activity:  -Mobilize patient x times a day  -Encourage activity and walks on unit  -Encourage or provide ROM exercises   -Turn and reposition patient every x Hours  -Use appropriate equipment to lift or move patient in bed  -Instruct/ Assist with weight shifting every x when out of bed in chair  -Consider limitation of chair time x hour intervals    Skin Care:  -Avoid use of baby powder, tape, friction and shearing, hot water or constrictive clothing  -Relieve pressure over bony prominences using x  -Do not massage red bony areas    Next Steps:  -Teach patient strategies to minimize risks such as x   -Consider consults to  interdisciplinary teams such as x  Outcome: Progressing     Problem: HEMATOLOGIC - ADULT  Goal: Maintains hematologic stability  Description: INTERVENTIONS  - Assess for signs and symptoms of bleeding or hemorrhage  - Monitor labs  - Administer supportive blood products/factors as ordered and appropriate  Outcome: Progressing     Problem: MUSCULOSKELETAL - ADULT  Goal: Maintain or return mobility to safest level of function  Description: INTERVENTIONS:  - Assess patient's ability to carry out ADLs; assess patient's baseline for ADL function and identify physical deficits which impact ability to perform ADLs (bathing, care of mouth/teeth, toileting, grooming, dressing, etc )  - Assess/evaluate cause of self-care deficits   - Assess range of motion  - Assess patient's mobility  - Assess patient's need for assistive devices and provide as appropriate  - Encourage maximum independence but intervene and supervise when necessary  - Involve family in performance of ADLs  - Assess for home care needs following discharge   - Consider OT consult to assist with ADL evaluation and planning for discharge  - Provide patient education as appropriate  Outcome: Progressing

## 2023-03-02 NOTE — ASSESSMENT & PLAN NOTE
· Presents from home due to generalized weakness with multiple falls over the past several days  Daily aspirin use  · No injuries noted on CT head and CT cervical spine  Patient denies injuries from the falls  · Found to have UTI infection  · possible etiology for increased weakness  · Patient also with admission to [facility] in December 2022, was discharged to [facility]  Per nephew patient has been doing well at his house since she was discharged from [facility] except for the last several days  · Fall precautions  · Likely for STR after discharge

## 2023-03-02 NOTE — PLAN OF CARE
Problem: Potential for Falls  Goal: Patient will remain free of falls  Description: INTERVENTIONS:  - Educate patient/family on patient safety including physical limitations  - Instruct patient to call for assistance with activity   - Consult OT/PT to assist with strengthening/mobility   - Keep Call bell within reach  - Keep bed low and locked with side rails adjusted as appropriate  - Keep care items and personal belongings within reach  - Initiate and maintain comfort rounds  - Make Fall Risk Sign visible to staff  - Offer Toileting every  Hours, in advance of need  - Initiate/Maintain alarm  - Obtain necessary fall risk management equipment:   - Apply yellow socks and bracelet for high fall risk patients  - Consider moving patient to room near nurses station  Outcome: Progressing     Problem: Prexisting or High Potential for Compromised Skin Integrity  Goal: Skin integrity is maintained or improved  Description: INTERVENTIONS:  - Identify patients at risk for skin breakdown  - Assess and monitor skin integrity  - Assess and monitor nutrition and hydration status  - Monitor labs   - Assess for incontinence   - Turn and reposition patient  - Assist with mobility/ambulation  - Relieve pressure over bony prominences  - Avoid friction and shearing  - Provide appropriate hygiene as needed including keeping skin clean and dry  - Evaluate need for skin moisturizer/barrier cream  - Collaborate with interdisciplinary team   - Patient/family teaching  - Consider wound care consult   Outcome: Progressing     Problem: MOBILITY - ADULT  Goal: Maintain or return to baseline ADL function  Description: INTERVENTIONS:  -  Assess patient's ability to carry out ADLs; assess patient's baseline for ADL function and identify physical deficits which impact ability to perform ADLs (bathing, care of mouth/teeth, toileting, grooming, dressing, etc )  - Assess/evaluate cause of self-care deficits   - Assess range of motion  - Assess patient's mobility; develop plan if impaired  - Assess patient's need for assistive devices and provide as appropriate  - Encourage maximum independence but intervene and supervise when necessary  - Involve family in performance of ADLs  - Assess for home care needs following discharge   - Consider OT consult to assist with ADL evaluation and planning for discharge  - Provide patient education as appropriate  Outcome: Progressing  Goal: Maintains/Returns to pre admission functional level  Description: INTERVENTIONS:  - Perform BMAT or MOVE assessment daily    - Set and communicate daily mobility goal to care team and patient/family/caregiver  - Collaborate with rehabilitation services on mobility goals if consulted  - Perform Range of Motion  times a day  - Reposition patient every  hours    - Dangle patient  times a day  - Stand patient  times a day  - Ambulate patient  times a day  - Out of bed to chair  times a day   - Out of bed for meals  times a day  - Out of bed for toileting  - Record patient progress and toleration of activity level   Outcome: Progressing     Problem: PAIN - ADULT  Goal: Verbalizes/displays adequate comfort level or baseline comfort level  Description: Interventions:  - Encourage patient to monitor pain and request assistance  - Assess pain using appropriate pain scale  - Administer analgesics based on type and severity of pain and evaluate response  - Implement non-pharmacological measures as appropriate and evaluate response  - Consider cultural and social influences on pain and pain management  - Notify physician/advanced practitioner if interventions unsuccessful or patient reports new pain  Outcome: Progressing     Problem: INFECTION - ADULT  Goal: Absence or prevention of progression during hospitalization  Description: INTERVENTIONS:  - Assess and monitor for signs and symptoms of infection  - Monitor lab/diagnostic results  - Monitor all insertion sites, i e  indwelling lines, tubes, and drains  - Monitor endotracheal if appropriate and nasal secretions for changes in amount and color  - [place] appropriate cooling/warming therapies per order  - Administer medications as ordered  - Instruct and encourage patient and family to use good hand hygiene technique  - Identify and instruct in appropriate isolation precautions for identified infection/condition  Outcome: Progressing     Problem: SAFETY ADULT  Goal: Patient will remain free of falls  Description: INTERVENTIONS:  - Educate patient/family on patient safety including physical limitations  - Instruct patient to call for assistance with activity   - Consult OT/PT to assist with strengthening/mobility   - Keep Call bell within reach  - Keep bed low and locked with side rails adjusted as appropriate  - Keep care items and personal belongings within reach  - Initiate and maintain comfort rounds  - Make Fall Risk Sign visible to staff  - Offer Toileting every  Hours, in advance of need  - Initiate/Maintain alarm  - Obtain necessary fall risk management equipment:   - Apply yellow socks and bracelet for high fall risk patients  - Consider moving patient to room near nurses station  Outcome: Progressing     Problem: DISCHARGE PLANNING  Goal: Discharge to home or other facility with appropriate resources  Description: INTERVENTIONS:  - Identify barriers to discharge w/patient and caregiver  - Arrange for needed discharge resources and transportation as appropriate  - Identify discharge learning needs (meds, wound care, etc )  - Arrange for interpretive services to assist at discharge as needed  - Refer to Case Management Department for coordinating discharge planning if the patient needs post-hospital services based on physician/advanced practitioner order or complex needs related to functional status, cognitive ability, or social support system  Outcome: Progressing     Problem: Knowledge Deficit  Goal: Patient/family/caregiver demonstrates understanding of disease process, treatment plan, medications, and discharge instructions  Description: Complete learning assessment and assess knowledge base  Interventions:  - Provide teaching at level of understanding  - Provide teaching via preferred learning methods  Outcome: Progressing     Problem: Nutrition/Hydration-ADULT  Goal: Nutrient/Hydration intake appropriate for improving, restoring or maintaining nutritional needs  Description: Monitor and assess patient's nutrition/hydration status for malnutrition  Collaborate with interdisciplinary team and initiate plan and interventions as ordered  Monitor patient's weight and dietary intake as ordered or per policy  Utilize nutrition screening tool and intervene as necessary  Determine patient's food preferences and provide high-protein, high-caloric foods as appropriate       INTERVENTIONS:  - Monitor oral intake, urinary output, labs, and treatment plans  - Assess nutrition and hydration status and recommend course of action  - Evaluate amount of meals eaten  - Assist patient with eating if necessary   - Allow adequate time for meals  - Recommend/ encourage appropriate diets, oral nutritional supplements, and vitamin/mineral supplements  - Order, calculate, and assess calorie counts as needed  - Recommend, monitor, and adjust tube feedings and TPN/PPN based on assessed needs  - Assess need for intravenous fluids  - Provide specific nutrition/hydration education as appropriate  - Include patient/family/caregiver in decisions related to nutrition  Outcome: Progressing     Problem: GENITOURINARY - ADULT  Goal: Maintains or returns to baseline urinary function  Description: INTERVENTIONS:  - Assess urinary function  - Encourage oral fluids to ensure adequate hydration if ordered  - Administer IV fluids as ordered to ensure adequate hydration  - Administer ordered medications as needed  - Offer frequent toileting  - Follow urinary retention protocol if ordered  Outcome: Progressing     Problem: METABOLIC, FLUID AND ELECTROLYTES - ADULT  Goal: Glucose maintained within target range  Description: INTERVENTIONS:  - Monitor Blood Glucose as ordered  - Assess for signs and symptoms of hyperglycemia and hypoglycemia  - Administer ordered medications to maintain glucose within target range  - Assess nutritional intake and initiate nutrition service referral as needed  Outcome: Progressing     Problem: SKIN/TISSUE INTEGRITY - ADULT  Goal: Skin Integrity remains intact(Skin Breakdown Prevention)  Description: Assess:  -Perform Nasir assessment every   -Clean and moisturize skin every   -Inspect skin when repositioning, toileting, and assisting with ADLS  -Assess under medical devices such as  every   -Assess extremities for adequate circulation and sensation     Bed Management:  -Have minimal linens on bed & keep smooth, unwrinkled  -Change linens as needed when moist or perspiring  -Avoid sitting or lying in one position for more than  hours while in bed  -Keep HOB at degrees     Toileting:  -Offer bedside commode  -Assess for incontinence every   -Use incontinent care products after each incontinent episode such as     Activity:  -Mobilize patient  times a day  -Encourage activity and walks on unit  -Encourage or provide ROM exercises   -Turn and reposition patient every  Hours  -Use appropriate equipment to lift or move patient in bed  -Instruct/ Assist with weight shifting every  when out of bed in chair  -Consider limitation of chair time  hour intervals    Skin Care:  -Avoid use of baby powder, tape, friction and shearing, hot water or constrictive clothing  -Relieve pressure over bony prominences using   -Do not massage red bony areas    Next Steps:  -Teach patient strategies to minimize risks such as    -Consider consults to  interdisciplinary teams such as  Outcome: Progressing     Problem: HEMATOLOGIC - ADULT  Goal: Maintains hematologic stability  Description: INTERVENTIONS  - Assess for signs and symptoms of bleeding or hemorrhage  - Monitor labs  - Administer supportive blood products/factors as ordered and appropriate  Outcome: Progressing     Problem: MUSCULOSKELETAL - ADULT  Goal: Maintain or return mobility to safest level of function  Description: INTERVENTIONS:  - Assess patient's ability to carry out ADLs; assess patient's baseline for ADL function and identify physical deficits which impact ability to perform ADLs (bathing, care of mouth/teeth, toileting, grooming, dressing, etc )  - Assess/evaluate cause of self-care deficits   - Assess range of motion  - Assess patient's mobility  - Assess patient's need for assistive devices and provide as appropriate  - Encourage maximum independence but intervene and supervise when necessary  - Involve family in performance of ADLs  - Assess for home care needs following discharge   - Consider OT consult to assist with ADL evaluation and planning for discharge  - Provide patient education as appropriate  Outcome: Progressing

## 2023-03-02 NOTE — ASSESSMENT & PLAN NOTE
No results found for: HGBA1C    Recent Labs     03/01/23  1540 03/01/23  2044 03/02/23  0717 03/02/23  1105   POCGLU 187* 109 107 125       Blood Sugar Average: Last 72 hrs:  (P) 125 5   · Home regimen: Januvia daily  · Hold home oral hypoglycemic  · SSI

## 2023-03-02 NOTE — PLAN OF CARE
Problem: Potential for Falls  Goal: Patient will remain free of falls  Description: INTERVENTIONS:  - Educate patient/family on patient safety including physical limitations  - Instruct patient to call for assistance with activity   - Consult OT/PT to assist with strengthening/mobility   - Keep Call bell within reach  - Keep bed low and locked with side rails adjusted as appropriate  - Keep care items and personal belongings within reach  - Initiate and maintain comfort rounds  - Make Fall Risk Sign visible to staff  - Offer Toileting every x Hours, in advance of need  - Initiate/Maintain xalarm  - Obtain necessary fall risk management equipment: x  - Apply yellow socks and bracelet for high fall risk patients  - Consider moving patient to room near nurses station  3/2/2023 1355 by Mendez Lin RN  Outcome: Adequate for Discharge  3/2/2023 0909 by Mendez Lin RN  Outcome: Progressing     Problem: Prexisting or High Potential for Compromised Skin Integrity  Goal: Skin integrity is maintained or improved  Description: INTERVENTIONS:  - Identify patients at risk for skin breakdown  - Assess and monitor skin integrity  - Assess and monitor nutrition and hydration status  - Monitor labs   - Assess for incontinence   - Turn and reposition patient  - Assist with mobility/ambulation  - Relieve pressure over bony prominences  - Avoid friction and shearing  - Provide appropriate hygiene as needed including keeping skin clean and dry  - Evaluate need for skin moisturizer/barrier cream  - Collaborate with interdisciplinary team   - Patient/family teaching  - Consider wound care consult   3/2/2023 1355 by Mendez Lin RN  Outcome: Adequate for Discharge  3/2/2023 0909 by Mendez Lin RN  Outcome: Progressing     Problem: MOBILITY - ADULT  Goal: Maintain or return to baseline ADL function  Description: INTERVENTIONS:  -  Assess patient's ability to carry out ADLs; assess patient's baseline for ADL function and identify physical deficits which impact ability to perform ADLs (bathing, care of mouth/teeth, toileting, grooming, dressing, etc )  - Assess/evaluate cause of self-care deficits   - Assess range of motion  - Assess patient's mobility; develop plan if impaired  - Assess patient's need for assistive devices and provide as appropriate  - Encourage maximum independence but intervene and supervise when necessary  - Involve family in performance of ADLs  - Assess for home care needs following discharge   - Consider OT consult to assist with ADL evaluation and planning for discharge  - Provide patient education as appropriate  3/2/2023 1355 by Juani Albarran RN  Outcome: Adequate for Discharge  3/2/2023 0909 by Juani Albarran RN  Outcome: Progressing  Goal: Maintains/Returns to pre admission functional level  Description: INTERVENTIONS:  - Perform BMAT or MOVE assessment daily    - Set and communicate daily mobility goal to care team and patient/family/caregiver  - Collaborate with rehabilitation services on mobility goals if consulted  - Perform Range of Motion x times a day  - Reposition patient every x hours    - Dangle patient x times a day  - Stand patient x times a day  - Ambulate patient x times a day  - Out of bed to chair x times a day   - Out of bed for meals x times a day  - Out of bed for toileting  - Record patient progress and toleration of activity level   3/2/2023 1355 by Juani Albarran RN  Outcome: Adequate for Discharge  3/2/2023 0909 by Juani Albarran RN  Outcome: Progressing     Problem: PAIN - ADULT  Goal: Verbalizes/displays adequate comfort level or baseline comfort level  Description: Interventions:  - Encourage patient to monitor pain and request assistance  - Assess pain using appropriate pain scale  - Administer analgesics based on type and severity of pain and evaluate response  - Implement non-pharmacological measures as appropriate and evaluate response  - Consider cultural and social influences on pain and pain management  - Notify physician/advanced practitioner if interventions unsuccessful or patient reports new pain  3/2/2023 1355 by Ira Perry RN  Outcome: Adequate for Discharge  3/2/2023 0909 by Ira Perry RN  Outcome: Progressing     Problem: INFECTION - ADULT  Goal: Absence or prevention of progression during hospitalization  Description: INTERVENTIONS:  - Assess and monitor for signs and symptoms of infection  - Monitor lab/diagnostic results  - Monitor all insertion sites, i e  indwelling lines, tubes, and drains  - Monitor endotracheal if appropriate and nasal secretions for changes in amount and color  - [place] appropriate cooling/warming therapies per order  - Administer medications as ordered  - Instruct and encourage patient and family to use good hand hygiene technique  - Identify and instruct in appropriate isolation precautions for identified infection/condition  3/2/2023 1355 by Ira Perry RN  Outcome: Adequate for Discharge  3/2/2023 0909 by Ira Perry RN  Outcome: Progressing     Problem: SAFETY ADULT  Goal: Patient will remain free of falls  Description: INTERVENTIONS:  - Educate patient/family on patient safety including physical limitations  - Instruct patient to call for assistance with activity   - Consult OT/PT to assist with strengthening/mobility   - Keep Call bell within reach  - Keep bed low and locked with side rails adjusted as appropriate  - Keep care items and personal belongings within reach  - Initiate and maintain comfort rounds  - Make Fall Risk Sign visible to staff  - Offer Toileting every x Hours, in advance of need  - Initiate/Maintain xalarm  - Obtain necessary fall risk management equipment: x  - Apply yellow socks and bracelet for high fall risk patients  - Consider moving patient to room near nurses station  3/2/2023 1355 by Ira Perry RN  Outcome: Adequate for Discharge  3/2/2023 0909 by Bubba Dee RN  Outcome: Progressing     Problem: DISCHARGE PLANNING  Goal: Discharge to home or other facility with appropriate resources  Description: INTERVENTIONS:  - Identify barriers to discharge w/patient and caregiver  - Arrange for needed discharge resources and transportation as appropriate  - Identify discharge learning needs (meds, wound care, etc )  - Arrange for interpretive services to assist at discharge as needed  - Refer to Case Management Department for coordinating discharge planning if the patient needs post-hospital services based on physician/advanced practitioner order or complex needs related to functional status, cognitive ability, or social support system  3/2/2023 1355 by Bubba Dee RN  Outcome: Adequate for Discharge  3/2/2023 0909 by Bubba Dee RN  Outcome: Progressing     Problem: Knowledge Deficit  Goal: Patient/family/caregiver demonstrates understanding of disease process, treatment plan, medications, and discharge instructions  Description: Complete learning assessment and assess knowledge base  Interventions:  - Provide teaching at level of understanding  - Provide teaching via preferred learning methods  3/2/2023 1355 by Bubba Dee RN  Outcome: Adequate for Discharge  3/2/2023 0909 by Bubba Dee RN  Outcome: Progressing     Problem: Nutrition/Hydration-ADULT  Goal: Nutrient/Hydration intake appropriate for improving, restoring or maintaining nutritional needs  Description: Monitor and assess patient's nutrition/hydration status for malnutrition  Collaborate with interdisciplinary team and initiate plan and interventions as ordered  Monitor patient's weight and dietary intake as ordered or per policy  Utilize nutrition screening tool and intervene as necessary  Determine patient's food preferences and provide high-protein, high-caloric foods as appropriate       INTERVENTIONS:  - Monitor oral intake, urinary output, labs, and treatment plans  - Assess nutrition and hydration status and recommend course of action  - Evaluate amount of meals eaten  - Assist patient with eating if necessary   - Allow adequate time for meals  - Recommend/ encourage appropriate diets, oral nutritional supplements, and vitamin/mineral supplements  - Order, calculate, and assess calorie counts as needed  - Recommend, monitor, and adjust tube feedings and TPN/PPN based on assessed needs  - Assess need for intravenous fluids  - Provide specific nutrition/hydration education as appropriate  - Include patient/family/caregiver in decisions related to nutrition  3/2/2023 1355 by Bubba Dee RN  Outcome: Adequate for Discharge  3/2/2023 0909 by Bubba Dee RN  Outcome: Progressing     Problem: GENITOURINARY - ADULT  Goal: Maintains or returns to baseline urinary function  Description: INTERVENTIONS:  - Assess urinary function  - Encourage oral fluids to ensure adequate hydration if ordered  - Administer IV fluids as ordered to ensure adequate hydration  - Administer ordered medications as needed  - Offer frequent toileting  - Follow urinary retention protocol if ordered  3/2/2023 1355 by Bubba Dee RN  Outcome: Adequate for Discharge  3/2/2023 0909 by Bubba Dee RN  Outcome: Progressing     Problem: METABOLIC, FLUID AND ELECTROLYTES - ADULT  Goal: Glucose maintained within target range  Description: INTERVENTIONS:  - Monitor Blood Glucose as ordered  - Assess for signs and symptoms of hyperglycemia and hypoglycemia  - Administer ordered medications to maintain glucose within target range  - Assess nutritional intake and initiate nutrition service referral as needed  3/2/2023 1355 by Bubba Dee RN  Outcome: Adequate for Discharge  3/2/2023 0909 by Bubba Dee RN  Outcome: Progressing     Problem: SKIN/TISSUE INTEGRITY - ADULT  Goal: Skin Integrity remains intact(Skin Breakdown Prevention)  Description: Assess:  -Perform Nasir assessment every x  -Clean and moisturize skin every x  -Inspect skin when repositioning, toileting, and assisting with ADLS  -Assess under medical devices such as x every x  -Assess extremities for adequate circulation and sensation     Bed Management:  -Have minimal linens on bed & keep smooth, unwrinkled  -Change linens as needed when moist or perspiring  -Avoid sitting or lying in one position for more than x hours while in bed  -Keep HOB at [facility]     Toileting:  -Offer bedside commode  -Assess for incontinence every x  -Use incontinent care products after each incontinent episode such as x    Activity:  -Mobilize patient x times a day  -Encourage activity and walks on unit  -Encourage or provide ROM exercises   -Turn and reposition patient every x Hours  -Use appropriate equipment to lift or move patient in bed  -Instruct/ Assist with weight shifting every x when out of bed in chair  -Consider limitation of chair time x hour intervals    Skin Care:  -Avoid use of baby powder, tape, friction and shearing, hot water or constrictive clothing  -Relieve pressure over bony prominences using xxxxxx  -Do not massage red bony areas    Next Steps:  -Teach patient strategies to minimize risks such as x   -Consider consults to  interdisciplinary teams such as x  3/2/2023 1355 by Debi Meng RN  Outcome: Adequate for Discharge  3/2/2023 0909 by Debi Meng RN  Outcome: Progressing     Problem: HEMATOLOGIC - ADULT  Goal: Maintains hematologic stability  Description: INTERVENTIONS  - Assess for signs and symptoms of bleeding or hemorrhage  - Monitor labs  - Administer supportive blood products/factors as ordered and appropriate  3/2/2023 1355 by Debi Meng RN  Outcome: Adequate for Discharge  3/2/2023 0909 by Debi Meng RN  Outcome: Progressing     Problem: MUSCULOSKELETAL - ADULT  Goal: Maintain or return mobility to safest level of function  Description: INTERVENTIONS:  - Assess patient's ability to carry out ADLs; assess patient's baseline for ADL function and identify physical deficits which impact ability to perform ADLs (bathing, care of mouth/teeth, toileting, grooming, dressing, etc )  - Assess/evaluate cause of self-care deficits   - Assess range of motion  - Assess patient's mobility  - Assess patient's need for assistive devices and provide as appropriate  - Encourage maximum independence but intervene and supervise when necessary  - Involve family in performance of ADLs  - Assess for home care needs following discharge   - Consider OT consult to assist with ADL evaluation and planning for discharge  - Provide patient education as appropriate  3/2/2023 1355 by Jami Hardy RN  Outcome: Adequate for Discharge  3/2/2023 0909 by Jami Hardy RN  Outcome: Progressing

## 2023-03-02 NOTE — DISCHARGE SUMMARY
[place]  Discharge- Benjaman Combe 1934, 80 y o  female MRN: [id number]  Unit/Bed#: -01 Encounter: [id number]  Primary Care Provider: Rommel Acuna MD   Date and time admitted to hospital: 2/27/2023 10:27 PM    COPD (chronic obstructive pulmonary disease) ([address] )  Assessment & Plan  · Not appear in COPD exacerbation at this time  · Continue home inhaler    Hypothyroidism  Assessment & Plan  · Continue levothyroxine 100 mcg daily    Type 2 diabetes mellitus, without long-term current use of insulin [facility])  Assessment & Plan  No results found for: HGBA1C    Recent Labs     03/01/23  1540 03/01/23  2044 03/02/23  0717 03/02/23  1105   POCGLU 187* 109 107 125       Blood Sugar Average: Last 72 hrs:  (P) 125 5   · Home regimen: Januvia daily  · Hold home oral hypoglycemic  · SSI    CKD (chronic kidney disease) stage 3, GFR 30-59 ml/min [facility])  Assessment & Plan  Lab Results   Component Value Date    EGFR 25 03/02/2023    EGFR 30 02/28/2023    EGFR 27 02/27/2023    CREATININE 1 74 (H) 03/02/2023    CREATININE 1 53 (H) 02/28/2023    CREATININE 1 64 (H) 02/27/2023   · Creatinine 1 64 on admission  · Baseline from care everywhere appears to be about 1 3-1 7  · Continue to monitor  At baseline  UTI (urinary tract infection)  Assessment & Plan  · No urinary complaints  · Urine  · UA: Large amount of leukocytes, positive nitrate  · Micro: 20-30 WBC, large amounts of bacteria  · Patient with admission at [facility] in December 2022 also with UTI/weakness  · Did not meet SIRS criteria  WBC 14 52  Vitals WNL  · Lactic and procalcitonin WNL  · Started on IV ceftriaxone - > keflex at dc  · Urine cultures e coli  Blood cx neg  Sensitivities reviewed  * Generalized weakness  Assessment & Plan  · Presents from home due to generalized weakness with multiple falls over the past several days  Daily aspirin use  · No injuries noted on CT head and CT cervical spine    Patient denies injuries from the falls  · Found to have UTI infection  · possible etiology for increased weakness  · Patient also with admission to [facility] in December 2022, was discharged to [facility]  Per nephew patient has been doing well at his house since she was discharged from [facility] except for the last several days  · Fall precautions  · Likely for STR after discharge  Hospital Course:     Jose Enrique Frias is a 80 y o  female patient who originally presented to the hospital on   Admission Orders (From admission, onward)     Ordered        02/28/23 0042  INPATIENT ADMISSION  Once                     due to weakness from urinary tract infection  Patient improved with intravenous antibiotics  She is asymptomatic from medical standpoint at time of discharge  She will complete an oral antibiotics at discharge  She was offered SNF placement due to mechanical deconditioning but patient and family declined  Please see above list of diagnoses and related plan for additional information  Physical Exam:    GEN: No acute distress, comfortable  HEEENT: No JVD, PERRLA, no scleral icterus  RESP: Lungs clear to auscultation bilaterally  CV: RRR, +s1/s2   ABD: SOFT NON TENDER, POSITIVE BOWEL SOUNDS, NO DISTENTION  PSYCH: CALM  NEURO: Mentation baseline, NO FOCAL DEFICITS  SKIN: NO RASH  EXTREM: NO EDEMA    CONSULTING PROVIDERS   IP CONSULT TO CASE MANAGEMENT    PROCEDURES PERFORMED  * No surgery found *    RADIOLOGY RESULTS  XR chest portable    Result Date: 2/28/2023  Narrative: CHEST INDICATION:   generalized weakness  Fall  COMPARISON:  None EXAM PERFORMED/VIEWS:  XR CHEST PORTABLE FINDINGS: Cardiomediastinal silhouette appears unremarkable  The lungs are clear  No pneumothorax or pleural effusion  Skeleton normal for age  No acute displaced fractures  Impression: No acute cardiopulmonary disease   Workstation performed: [id number]     CT head without contrast    Result Date: 2/27/2023  Narrative: CT BRAIN - WITHOUT CONTRAST INDICATION:   fall  COMPARISON:  None  TECHNIQUE:  CT examination of the brain was performed  In addition to axial images, sagittal and coronal 2D reformatted images were created and submitted for interpretation  Radiation dose length product (DLP) for this visit:  898 65 mGy-cm   This examination, like all CT scans performed in the [facility], was performed utilizing techniques to minimize radiation dose exposure, including the use of iterative  reconstruction and automated exposure control  IMAGE QUALITY:  Diagnostic  FINDINGS: PARENCHYMA:  No intracranial mass, mass effect or midline shift  No CT signs of acute infarction  No acute parenchymal hemorrhage  There is marked periventricular white matter low attenuation which is nonspecific and most likely related to chronic small vessel ischemic changes  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  VENTRICLES AND EXTRA-AXIAL SPACES:  There is mild to moderate central greater than cortical volume loss  VISUALIZED ORBITS: Normal visualized orbits  PARANASAL SINUSES: Normal visualized paranasal sinuses  CALVARIUM AND EXTRACRANIAL SOFT TISSUES:  Normal      Impression: No acute intracranial abnormality  Marked chronic small vessel ischemic changes  Mild to moderate central greater than cortical volume loss  Workstation performed: [id number]     CT spine cervical without contrast    Result Date: 2/28/2023  Narrative: CT CERVICAL SPINE - WITHOUT CONTRAST INDICATION:   fall  COMPARISON:  None  TECHNIQUE:  CT examination of the cervical spine was performed without intravenous contrast   Contiguous axial images were obtained  Sagittal and coronal reconstructions were performed  Radiation dose length product (DLP) for this visit:  327 53 mGy-cm     This examination, like all CT scans performed in the [facility], was performed utilizing techniques to minimize radiation dose exposure, including the use of iterative  reconstruction and automated exposure control  IMAGE QUALITY:  Diagnostic  FINDINGS: ALIGNMENT:  Normal alignment of the cervical spine  No subluxation  VERTEBRAE:  No fracture  DEGENERATIVE CHANGES:  Moderate multilevel cervical degenerative changes are noted  No critical central canal stenosis  PREVERTEBRAL AND PARASPINAL SOFT TISSUES: Unremarkable THORACIC INLET:  Mild emphysematous changes are noted at the lung apices  Postoperative changes of right mastoid surgery are present with partial opacification of the right mastoid air cells  Impression: No cervical spine fracture or traumatic malalignment    Workstation performed: [id number]       LABS  Results from last 7 days   Lab Units 03/02/23  0210 02/28/23  0531 02/27/23  2317   WBC Thousand/uL 14 16* 13 48* 14 52*   HEMOGLOBIN g/dL 10 5* 10 0* 9 9*   HEMATOCRIT % 32 8* 31 5* 31 1*   MCV fL 95 96 95   PLATELETS Thousands/uL 282 261 278   INR   --   --  0 93     Results from last 7 days   Lab Units 03/02/23 0210 02/28/23  0531 02/27/23  2317   SODIUM mmol/L 139 140 139   POTASSIUM mmol/L 4 2 3 8 3 8   CHLORIDE mmol/L 104 106 105   CO2 mmol/L 28 27 26   BUN mg/dL 33* 32* 36*   CREATININE mg/dL 1 74* 1 53* 1 64*   CALCIUM mg/dL 9 2 9 3 9 4   ALBUMIN g/dL  --   --  3 7   TOTAL BILIRUBIN mg/dL  --   --  0 49   ALK PHOS U/L  --   --  79   ALT U/L  --   --  10   AST U/L  --   --  13   EGFR ml/min/1 73sq m 25 30 27   GLUCOSE RANDOM mg/dL 111 114 108     Results from last 7 days   Lab Units 02/28/23  0148 02/27/23  2317   HS TNI 0HR ng/L  --  12   HS TNI 2HR ng/L 12  --               Results from last 7 days   Lab Units 03/02/23  1105 03/02/23  0717 03/01/23  2044 03/01/23  1540 03/01/23  1045 03/01/23  0706 02/28/23  2050 02/28/23  1342 02/28/23  0901 02/27/23  2225   POC GLUCOSE mg/dl 125 107 109 187* 166* 99 109 106 143* 104             Results from last 7 days   Lab Units 02/27/23  2317   LACTIC ACID mmol/L 0 6   PROCALCITONIN ng/ml 0 06 Cultures:   Results from last 7 days   Lab Units 02/28/23  0003   COLOR UA  Light Yellow   CLARITY UA  Slightly Cloudy   SPEC GRAV UA  1 020   PH UA  6 0   LEUKOCYTES UA  Large*   NITRITE UA  Positive*   GLUCOSE UA mg/dl Negative   KETONES UA mg/dl Negative   BILIRUBIN UA  Negative   BLOOD UA  Small*      Results from last 7 days   Lab Units 02/28/23  0003   RBC UA /hpf 0-1   WBC UA /hpf 20-30*   EPITHELIAL CELLS WET PREP /hpf Occasional   BACTERIA UA /hpf Innumerable*   OTHER OBSERVATIONS  Yeast Cells Present      Results from last 7 days   Lab Units 02/28/23 0003 02/27/23  2317   BLOOD CULTURE   --  No Growth at 48 hrs  No Growth at 48 hrs  URINE CULTURE  >100,000 cfu/ml Escherichia coli*  20,000-29,000 cfu/ml  --    INFLUENZA A PCR   --  Negative         Condition at Discharge:  good      Discharge instructions/Information to patient and family:   See after visit summary for information provided to patient and family  Provisions for Follow-Up Care:  See after visit summary for information related to follow-up care and any pertinent home health orders  Disposition:     Home with VNA Services (Reminder: Complete face to face encounter)       Discharge Statement:  I spent 38 minutes discharging the patient  This time was spent on the day of discharge  I had direct contact with the patient on the day of discharge  Greater than 50% of the total time was spent examining patient, answering all patient questions, arranging and discussing plan of care with patient as well as directly providing post-discharge instructions  Additional time then spent on discharge activities  Discharge Medications:  See after visit summary for reconciled discharge medications provided to patient and family        ** Please Note: This note has been constructed using a voice recognition system **

## 2023-03-02 NOTE — ASSESSMENT & PLAN NOTE
Lab Results   Component Value Date    EGFR 25 03/02/2023    EGFR 30 02/28/2023    EGFR 27 02/27/2023    CREATININE 1 74 (H) 03/02/2023    CREATININE 1 53 (H) 02/28/2023    CREATININE 1 64 (H) 02/27/2023   · Creatinine 1 64 on admission  · Baseline from care everywhere appears to be about 1 3-1 7  · Continue to monitor  At baseline

## 2023-03-02 NOTE — ASSESSMENT & PLAN NOTE
· No urinary complaints  · Urine  · UA: Large amount of leukocytes, positive nitrate  · Micro: 20-30 WBC, large amounts of bacteria  · Patient with admission at [facility] in December 2022 also with UTI/weakness  · Did not meet SIRS criteria  WBC 14 52  Vitals WNL  · Lactic and procalcitonin WNL  · Started on IV ceftriaxone - > keflex at dc  · Urine cultures e coli  Blood cx neg  Sensitivities reviewed

## 2023-03-03 NOTE — UTILIZATION REVIEW
NOTIFICATION OF ADMISSION DISCHARGE   This is a Notification of Discharge from [address]  Please be advised that this patient has been discharge from our facility  Below you will find the admission and discharge date and time including the patient’s disposition  UTILIZATION REVIEW CONTACT:  Alexander Kay  Utilization   Network Utilization Review Department  Phone: [phone] carefully listen to the prompts  All voicemails are confidential   Email: [email or web address]     ADMISSION INFORMATION  PRESENTATION DATE: 2/27/2023 10:27 PM  OBERVATION ADMISSION DATE:   INPATIENT ADMISSION DATE: 2/28/23 12:42 AM   DISCHARGE DATE: 3/2/2023  3:21 PM   DISPOSITION:Home/Self Care    IMPORTANT INFORMATION:  Send all requests for admission clinical reviews, approved or denied determinations and any other requests to dedicated fax number below belonging to the campus where the patient is receiving treatment   List of dedicated fax numbers:  [address] DENIALS (Administrative/Medical Necessity) [phone]   [address] (Maternity/NICU/Pediatrics) [phone]   [facility] [phone]   [place] [address] [phone]   [address] [phone]   [address] [facility] [phone]   [address] [facility] [phone]   [address] [place] [address] 119 [phone]   [facility]  [phone] [address] [facility] [phone]   [address] [place] [address] [address] [facility] [phone]

## 2023-03-05 LAB
BACTERIA BLD CULT: NORMAL
BACTERIA BLD CULT: NORMAL

## 2023-03-26 ENCOUNTER — HOSPITAL ENCOUNTER (EMERGENCY)
Facility: HOSPITAL | Age: 88
Discharge: HOME | End: 2023-03-26
Attending: EMERGENCY MEDICINE

## 2023-03-26 ENCOUNTER — APPOINTMENT (EMERGENCY)
Dept: CT IMAGING | Facility: HOSPITAL | Age: 88
End: 2023-03-26

## 2023-03-26 ENCOUNTER — APPOINTMENT (EMERGENCY)
Dept: RADIOLOGY | Facility: HOSPITAL | Age: 88
End: 2023-03-26

## 2023-03-26 VITALS
BODY MASS INDEX: 22.87 KG/M2 | OXYGEN SATURATION: 96 % | WEIGHT: 121.03 LBS | HEART RATE: 72 BPM | SYSTOLIC BLOOD PRESSURE: 135 MMHG | DIASTOLIC BLOOD PRESSURE: 63 MMHG | TEMPERATURE: 98.2 F | RESPIRATION RATE: 18 BRPM

## 2023-03-26 DIAGNOSIS — N39.0 UTI (URINARY TRACT INFECTION): ICD-10-CM

## 2023-03-26 DIAGNOSIS — S00.83XA FOREHEAD CONTUSION, INITIAL ENCOUNTER: ICD-10-CM

## 2023-03-26 DIAGNOSIS — N18.9 CHRONIC RENAL INSUFFICIENCY: ICD-10-CM

## 2023-03-26 DIAGNOSIS — S09.90XA MINOR HEAD INJURY, INITIAL ENCOUNTER: ICD-10-CM
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
## 2023-03-26 LAB
ALBUMIN SERPL BCP-MCNC: 3.8 G/DL (ref 3.5–5)
ALP SERPL-CCNC: 84 U/L (ref 34–104)
ALT SERPL W P-5'-P-CCNC: 12 U/L (ref 7–52)
ANION GAP SERPL CALCULATED.3IONS-SCNC: 8 MMOL/L (ref 4–13)
AST SERPL W P-5'-P-CCNC: 15 U/L (ref 13–39)
ATRIAL RATE: 78 BPM
BASOPHILS # BLD AUTO: 0.04 THOUSANDS/ÂΜL (ref 0–0.1)
BASOPHILS NFR BLD AUTO: 0 % (ref 0–1)
BILIRUB SERPL-MCNC: 0.6 MG/DL (ref 0.2–1)
BUN SERPL-MCNC: 36 MG/DL (ref 5–25)
CALCIUM SERPL-MCNC: 9.5 MG/DL (ref 8.4–10.2)
CARDIAC TROPONIN I PNL SERPL HS: 12 NG/L
CHLORIDE SERPL-SCNC: 102 MMOL/L (ref 96–108)
CLARITY, POC: ABNORMAL
CO2 SERPL-SCNC: 29 MMOL/L (ref 21–32)
COLOR, POC: YELLOW
CREAT SERPL-MCNC: 1.57 MG/DL (ref 0.6–1.3)
EOSINOPHIL # BLD AUTO: 0.1 THOUSAND/ÂΜL (ref 0–0.61)
EOSINOPHIL NFR BLD AUTO: 1 % (ref 0–6)
ERYTHROCYTE [DISTWIDTH] IN BLOOD BY AUTOMATED COUNT: 13.4 % (ref 11.6–15.1)
EXT BILIRUBIN, UA: NEGATIVE
EXT BLOOD URINE: ABNORMAL
EXT GLUCOSE, UA: NEGATIVE
EXT KETONES: NEGATIVE MG/DL
EXT LEUKOCYTE EST: ABNORMAL
EXT NITRITE, UA: ABNORMAL
EXT PH, UA: 6.5 (ref 4.5–8)
EXT PROTEIN, UA: ABNORMAL MG/DL
EXT SPECIFIC GRAVITY, UA: 1.02 (ref 1–1.03)
EXT UROBILINOGEN: 0.2
GFR SERPL CREATININE-BSD FRML MDRD: 29 ML/MIN/1.73SQ M
GLUCOSE SERPL-MCNC: 181 MG/DL (ref 65–140)
HCT VFR BLD AUTO: 36.1 % (ref 34.8–46.1)
HGB BLD-MCNC: 11.6 G/DL (ref 11.5–15.4)
IMM GRANULOCYTES # BLD AUTO: 0.06 THOUSAND/UL (ref 0–0.2)
IMM GRANULOCYTES NFR BLD AUTO: 1 % (ref 0–2)
LYMPHOCYTES # BLD AUTO: 2.49 THOUSANDS/ÂΜL (ref 0.6–4.47)
LYMPHOCYTES NFR BLD AUTO: 19 % (ref 14–44)
MCH RBC QN AUTO: 30.4 PG (ref 26.8–34.3)
MCHC RBC AUTO-ENTMCNC: 32.1 G/DL (ref 31.4–37.4)
MCV RBC AUTO: 95 FL (ref 82–98)
MONOCYTES # BLD AUTO: 0.61 THOUSAND/ÂΜL (ref 0.17–1.22)
MONOCYTES NFR BLD AUTO: 5 % (ref 4–12)
NEUTROPHILS # BLD AUTO: 9.87 THOUSANDS/ÂΜL (ref 1.85–7.62)
NEUTS SEG NFR BLD AUTO: 74 % (ref 43–75)
NRBC BLD AUTO-RTO: 0 /100 WBCS
P AXIS: 68 DEGREES
PLATELET # BLD AUTO: 267 THOUSANDS/UL (ref 149–390)
PMV BLD AUTO: 9.7 FL (ref 8.9–12.7)
POTASSIUM SERPL-SCNC: 4.3 MMOL/L (ref 3.5–5.3)
PR INTERVAL: 130 MS
PROT SERPL-MCNC: 8.1 G/DL (ref 6.4–8.4)
QRS AXIS: 74 DEGREES
QRSD INTERVAL: 70 MS
QT INTERVAL: 414 MS
QTC INTERVAL: 471 MS
RBC # BLD AUTO: 3.82 MILLION/UL (ref 3.81–5.12)
SODIUM SERPL-SCNC: 139 MMOL/L (ref 135–147)
T WAVE AXIS: 78 DEGREES
VENTRICULAR RATE: 78 BPM
WBC # BLD AUTO: 13.17 THOUSAND/UL (ref 4.31–10.16)

## 2023-03-26 RX ORDER — CEPHALEXIN 500 MG/1
500 CAPSULE ORAL EVERY 12 HOURS SCHEDULED
Qty: 10 CAPSULE | Refills: 0 | Status: SHIPPED | OUTPATIENT
Start: 2023-03-26 | End: 2023-03-31

## 2023-03-26 NOTE — ED PROVIDER NOTES
History  Chief Complaint   Patient presents with   • Head Injury     To ED via EMS for evaluation after fall in the kitchen last night with right sided head strike  No known LOC, increased confusion per family  Unknown fever or chills  40-year-old female presents for the evaluation of a fall in her kitchen last evening  She struck the right side of her head  There was concern about increasing confusion  Prior to Admission Medications   Prescriptions Last Dose Informant Patient Reported? Taking? Fesoterodine Fumarate ER 8 MG TB24   Yes No   Sig: Take 8 mg by mouth daily   albuterol (PROVENTIL HFA,VENTOLIN HFA) 90 mcg/act inhaler   Yes No   Sig: inhale 2 puff by mouth and INTO THE LUNGS every 4 hours if needed for wheezing   aspirin (ECOTRIN LOW STRENGTH) 81 mg EC tablet   Yes No   Sig: Take 81 mg by mouth   levothyroxine 100 mcg tablet   Yes No   Sig: Take 100 mcg by mouth daily   sitaGLIPtin (Januvia) 25 mg tablet   Yes No   tiotropium (Spiriva Respimat) 2 5 MCG/ACT AERS inhaler   Yes No   Sig: Inhale 2 puffs 2 (two) times a day      Facility-Administered Medications: None       Past Medical History:   Diagnosis Date   • COPD (chronic obstructive pulmonary disease) (HCC)    • Diabetes mellitus (HCC)    • Disease of thyroid gland    • Hypertension        History reviewed  No pertinent surgical history  History reviewed  No pertinent family history  I have reviewed and agree with the history as documented  E-Cigarette/Vaping   • E-Cigarette Use Never User      E-Cigarette/Vaping Substances     Social History     Tobacco Use   • Smoking status: Unknown   Vaping Use   • Vaping Use: Never used   Substance Use Topics   • Alcohol use: Never   • Drug use: Never       Review of Systems   Respiratory: Negative for chest tightness and shortness of breath  Cardiovascular: Negative for chest pain  Physical Exam  Physical Exam  Constitutional:       General: She is not in acute distress  Appearance: She is well-developed  HENT:      Head: Normocephalic and atraumatic  No raccoon eyes or Sheets's sign  Right Ear: Tympanic membrane and external ear normal  Decreased hearing noted  No hemotympanum  Tympanic membrane is not perforated  Left Ear: Tympanic membrane and external ear normal  Decreased hearing noted  No hemotympanum  Tympanic membrane is not perforated  Eyes:      Conjunctiva/sclera: Conjunctivae normal       Pupils: Pupils are equal, round, and reactive to light  Cardiovascular:      Rate and Rhythm: Normal rate and regular rhythm  Heart sounds: Normal heart sounds  No murmur heard  Pulmonary:      Effort: Pulmonary effort is normal  No respiratory distress  Breath sounds: Normal breath sounds  Abdominal:      Palpations: Abdomen is soft  Tenderness: There is no abdominal tenderness  There is no guarding or rebound  Musculoskeletal:         General: No tenderness  Normal range of motion  Cervical back: Full passive range of motion without pain, normal range of motion and neck supple  No spinous process tenderness  Skin:     General: Skin is warm and dry  Neurological:      Mental Status: She is alert and oriented to person, place, and time  Cranial Nerves: No cranial nerve deficit           Vital Signs  ED Triage Vitals [03/26/23 0929]   Temperature Pulse Respirations Blood Pressure SpO2   98 2 °F (36 8 °C) 77 20 128/77 98 %      Temp Source Heart Rate Source Patient Position - Orthostatic VS BP Location FiO2 (%)   Oral Monitor Lying Right arm --      Pain Score       3           Vitals:    03/26/23 1045 03/26/23 1050 03/26/23 1055 03/26/23 1100   BP: 161/69   135/63   Pulse: 73 71 72 72   Patient Position - Orthostatic VS:             Visual Acuity  Visual Acuity    Flowsheet Row Most Recent Value   L Pupil Size (mm) 2   R Pupil Size (mm) 2          ED Medications  Medications - No data to display    Diagnostic Studies  Results Reviewed Procedure Component Value Units Date/Time    HS Troponin I 4hr [[id number]]     Lab Status: No result Specimen: Blood     POCT urinalysis dipstick [[id number]]  (Abnormal) Resulted: 03/26/23 1040    Lab Status: Final result Specimen: Urine Updated: 03/26/23 1043     Color, UA Yellow     Clarity, UA Cloudy     Spec Grav, UA 1 020     pH, UA 6 5     EXT Leukocytes, UA Large     Nitrite, UA Interference- unable to analyze     Protein, UA 30 (1+) mg/dl      Glucose, UA Negative     Ketones, UA Negative mg/dl      EXT Urobilinogen, UA 0 2      Bilirubin, UA Negative     Blood, UA Small    HS Troponin 0hr (reflex protocol) [[id number]]  (Normal) Collected: 03/26/23 0932    Lab Status: Final result Specimen: Blood from Arm, Right Updated: 03/26/23 1006     hs TnI 0hr 12 ng/L     Comprehensive metabolic panel [[id number]]  (Abnormal) Collected: 03/26/23 0932    Lab Status: Final result Specimen: Blood from Arm, Right Updated: 03/26/23 0957     Sodium 139 mmol/L      Potassium 4 3 mmol/L      Chloride 102 mmol/L      CO2 29 mmol/L      ANION GAP 8 mmol/L      BUN 36 mg/dL      Creatinine 1 57 mg/dL      Glucose 181 mg/dL      Calcium 9 5 mg/dL      AST 15 U/L      ALT 12 U/L      Alkaline Phosphatase 84 U/L      Total Protein 8 1 g/dL      Albumin 3 8 g/dL      Total Bilirubin 0 60 mg/dL      eGFR 29 ml/min/1 73sq m     Narrative:      Anupam guidelines for Chronic Kidney Disease (CKD):   •  Stage 1 with normal or high GFR (GFR > 90 mL/min/1 73 square meters)  •  Stage 2 Mild CKD (GFR = 60-89 mL/min/1 73 square meters)  •  Stage 3A Moderate CKD (GFR = 45-59 mL/min/1 73 square meters)  •  Stage 3B Moderate CKD (GFR = 30-44 mL/min/1 73 square meters)  •  Stage 4 Severe CKD (GFR = 15-29 mL/min/1 73 square meters)  •  Stage 5 End Stage CKD (GFR <15 mL/min/1 73 square meters)  Note: GFR calculation is accurate only with a steady state creatinine    CBC and differential [[id number]]  (Abnormal) Collected: 03/26/23 0932    Lab Status: Final result Specimen: Blood from Arm, Right Updated: 03/26/23 0937     WBC 13 17 Thousand/uL      RBC 3 82 Million/uL      Hemoglobin 11 6 g/dL      Hematocrit 36 1 %      MCV 95 fL      MCH 30 4 pg      MCHC 32 1 g/dL      RDW 13 4 %      MPV 9 7 fL      Platelets 352 Thousands/uL      nRBC 0 /100 WBCs      Neutrophils Relative 74 %      Immat GRANS % 1 %      Lymphocytes Relative 19 %      Monocytes Relative 5 %      Eosinophils Relative 1 %      Basophils Relative 0 %      Neutrophils Absolute 9 87 Thousands/µL      Immature Grans Absolute 0 06 Thousand/uL      Lymphocytes Absolute 2 49 Thousands/µL      Monocytes Absolute 0 61 Thousand/µL      Eosinophils Absolute 0 10 Thousand/µL      Basophils Absolute 0 04 Thousands/µL                  TRAUMA - CT head wo contrast   Final Result by Fabi Landrum MD (03/26 1009)      No acute intracranial abnormality  The study was marked in [facility] for immediate notification given trauma status  Workstation performed: [id number]         TRAUMA - CT spine cervical wo contrast   Final Result by Fabi Landrum MD (03/26 1015)      No cervical spine fracture or traumatic malalignment  The study was marked in [facility] for immediate notification given trauma status  Workstation performed: [id number]         XR Trauma chest portable   ED Interpretation by Claire Church DO (03/26 6385)   My X-ray interpretation of the Chest: was negative for infiltrate, effusion, pneumothorax, or wide mediastinum      XR Trauma pelvis ap only 1 or 2 vw   ED Interpretation by Claire Church DO (03/26 2954)   No acute osseous abnormality                 Procedures  ECG 12 Lead Documentation Only    Date/Time: 3/26/2023 11:02 AM  Performed by: Claire Church DO  Authorized by:  Claire Church DO     Indications / Diagnosis:  Fall  ECG reviewed by me, the ED Provider: yes    Patient location:  ED  Previous ECG:     Previous ECG:  Compared to current    Comparison ECG info:  2/27/23    Similarity:  No change  Interpretation:     Interpretation: normal    Rate:     ECG rate assessment: normal    Rhythm:     Rhythm: sinus rhythm    Ectopy:     Ectopy: none    QRS:     QRS axis:  Normal    QRS intervals:  Normal  Conduction:     Conduction: normal    ST segments:     ST segments:  Normal  T waves:     T waves: normal               ED Course  ED Course as of 03/26/23 1302   Sun Mar 26, 2023   1025 hs TnI 0hr: 12  Unchanged when compared to previous from 3 weeks ago   0 Patient's son at the bedside  He discussed his concern about the patient being a little confused this morning which she states has improved  He is concerned because the patient has had 4-5 UTIs over the past couple of years  SBIRT 22yo+    Flowsheet Row Most Recent Value   SBIRT (23 yo +)    In order to provide better care to our patients, we are screening all of our patients for alcohol and drug use  Would it be okay to ask you these screening questions? Yes Filed at: 03/26/2023 1689   Initial Alcohol Screen: US AUDIT-C     1  How often do you have a drink containing alcohol? 0 Filed at: 03/26/2023 0958   2  How many drinks containing alcohol do you have on a typical day you are drinking? 0 Filed at: 03/26/2023 0958   3a  Male UNDER 65: How often do you have five or more drinks on one occasion? 0 Filed at: 03/26/2023 0958   3b  FEMALE Any Age, or MALE 65+: How often do you have 4 or more drinks on one occassion? 0 Filed at: 03/26/2023 0958   Audit-C Score 0 Filed at: 03/26/2023 5138   EVELYN: How many times in the past year have you    Used an illegal drug or used a prescription medication for non-medical reasons?  Never Filed at: 03/26/2023 7154                    Medical Decision Making  The plan is to obtain a CT of the head and cervical spine to rule out clinically significant injury such as skull fracture, epidural or subdural hematoma  Will also rule out cervical spine fracture or dislocation  I will also obtain labs to rule out dehydration, anemia, renal failure or electrolyte disturbance  Urinalysis will be obtained to rule out UTI given confusion    Plan for antibiotics and discharge    The patient (and any family present) verbalized understanding of the discharge instructions and warnings that would necessitate return to the Emergency Department  All questions were answered prior to discharge  Amount and/or Complexity of Data Reviewed  External Data Reviewed: notes  Details: Discharge summary from February 27 reviewed for recent confusion UTI  Labs: ordered  Decision-making details documented in ED Course  Radiology: ordered and independent interpretation performed  Risk  Prescription drug management  Disposition  Final diagnoses:   Fall, initial encounter   Forehead contusion, initial encounter   UTI (urinary tract infection)   Minor head injury, initial encounter   Chronic renal insufficiency     Time reflects when diagnosis was documented in both MDM as applicable and the Disposition within this note     Time User Action Codes Description Comment    3/26/2023 11:04 AM Velora Merritts Add [X38  Brand Counts Fall, initial encounter     3/26/2023 11:04 AM Tory Arbour B Add [S00 83XA] Forehead contusion, initial encounter     3/26/2023 11:04 AM Tory Arbour B Add [N39 0] UTI (urinary tract infection)     3/26/2023 11:04 AM Tory Arbour B Add [S09 90XA] Minor head injury, initial encounter     3/26/2023 11:04 AM Velora Merritts Add [N18 9] Chronic renal insufficiency       ED Disposition     ED Disposition   Discharge    Condition   Stable    Date/Time   Sun Mar 26, 2023 11:04 AM    Comment   Danyelle Colon discharge to home/self care                 Follow-up Information     Follow up With Specialties Details Why Contact Info Additional Information    Theo Faustin MD Family Medicine Schedule an appointment as soon as possible for a visit in 1 week For further evaluation, if not improved [address]ann[address] [place] [facility] Emergency Medicine Go to  If symptoms worsen [address] [place], [address]  [address] [facility] Emergency Department, [address] [place] [address], [address] [place] [address] Gideon Esquivel Zachery 10          Discharge Medication List as of 3/26/2023 11:07 AM      START taking these medications    Details   cephalexin (KEFLEX) 500 mg capsule Take 1 capsule (500 mg total) by mouth every 12 (twelve) hours for 5 days, Starting Sun 3/26/2023, Until Fri 3/31/2023, Normal         CONTINUE these medications which have NOT CHANGED    Details   albuterol (PROVENTIL HFA,VENTOLIN HFA) 90 mcg/act inhaler inhale 2 puff by mouth and INTO THE LUNGS every 4 hours if needed for wheezing, Historical Med      aspirin (ECOTRIN LOW STRENGTH) 81 mg EC tablet Take 81 mg by mouth, Historical Med      Fesoterodine Fumarate ER 8 MG TB24 Take 8 mg by mouth daily, Starting Mon 2/6/2023, Until Thu 2/1/2024, Historical Med      levothyroxine 100 mcg tablet Take 100 mcg by mouth daily, Historical Med      sitaGLIPtin (Januvia) 25 mg tablet Starting Wed 1/25/2023, Historical Med      tiotropium (Spiriva Respimat) 2 5 MCG/ACT AERS inhaler Inhale 2 puffs 2 (two) times a day, Starting Fri 12/16/2022, Historical Med             No discharge procedures on file      PDMP Review     None          ED Provider  Electronically Signed by           Juanita Gifford DO  03/26/23 2925

## 2023-03-26 NOTE — ED PROVIDER NOTES
"Emergency Department Trauma Note  Carlos Alvarado 80 y o  female MRN: [id number]  Unit/Bed#: ED TR13/TR13B Encounter: [id number]      Trauma Alert:    Model of Arrival:   via    Trauma Team: Current Providers  Attending Provider: Forest Hood DO  Consultants: {[facility] Trauma Consultants:[id number]}      History of Present Illness     Chief Complaint: No chief complaint on file  HPI:  Carlos Alvarado is a 80 y o  female who presents with fall last evening due to increased difficulty ambulating and reported mild confusion from UTI  Patient denies any associated chest pain or pressure  The patient denies any new focal weakness  The patient was using her assistive device when she was walking and fell last evening  Mechanism:           HPI  Review of Systems   Respiratory: Negative for chest tightness and shortness of breath  Cardiovascular: Negative for chest pain  Neurological: Negative for syncope  Historical Information     Immunizations:   Immunization History   Administered Date(s) Administered   • COVID-19 PFIZER VACCINE 0 3 ML IM 03/12/2021, 04/02/2021       Past Medical History:   Diagnosis Date   • COPD (chronic obstructive pulmonary disease) ([facility])    • Diabetes mellitus ([facility] [address] )    • Disease of thyroid gland    • Hypertension      No family history on file  No past surgical history on file  Social History     Tobacco Use   • Smoking status: Unknown   Vaping Use   • Vaping Use: Never used   Substance Use Topics   • Alcohol use: Never     E-Cigarette/Vaping   • E-Cigarette Use Never User      E-Cigarette/Vaping Substances       Family History: {[facility] IP Trauma Family History:90439::\"non-contributory\"}    Meds/Allergies   Prior to Admission Medications   Prescriptions Last Dose Informant Patient Reported? Taking?    Fesoterodine Fumarate ER 8 MG TB24   Yes No   Sig: Take 8 mg by mouth daily   albuterol (PROVENTIL HFA,VENTOLIN HFA) 90 mcg/act inhaler   Yes No   Sig: inhale 2 puff by mouth and INTO THE " LUNGS every 4 hours if needed for wheezing   aspirin (ECOTRIN LOW STRENGTH) 81 mg EC tablet   Yes No   Sig: Take 81 mg by mouth   levothyroxine 100 mcg tablet   Yes No   Sig: Take 100 mcg by mouth daily   sitaGLIPtin (Januvia) 25 mg tablet   Yes No   tiotropium (Spiriva Respimat) 2 5 MCG/ACT AERS inhaler   Yes No   Sig: Inhale 2 puffs 2 (two) times a day      Facility-Administered Medications: None       Allergies   Allergen Reactions   • Aspirin GI Intolerance   • Erythromycin Other (See Comments)     unknown   • Ibuprofen Hives   • Iodine - Food Allergy Hives   • Lidocaine Hives   • Metformin Diarrhea   • Penicillin V Hives     TOLERATED CEFTRIAXONE 12/2022   • Rosuvastatin Myalgia   • Sulfamethoxazole-Trimethoprim Hives       PHYSICAL EXAM    PE limited by: ***    Objective   Vitals:   First set:      Primary Survey:   (A) Airway: ***  (B) Breathing: ***  (C) Circulation: Pulses:   {Pulses:94126}  (D) Disabliity:  {GCS response:08032}  (E) Expose:  {Completed:39813}    Secondary Survey: (Click on Physical Exam tab above)  Physical Exam  Constitutional:       General: She is not in acute distress  Appearance: She is well-developed  HENT:      Head: Normocephalic  Contusion present  No raccoon eyes or Sheets's sign  Right Ear: Tympanic membrane and external ear normal  Decreased hearing noted  No hemotympanum  Tympanic membrane is not perforated  Left Ear: Tympanic membrane and external ear normal  Decreased hearing noted  No hemotympanum  Tympanic membrane is not perforated  Eyes:      Conjunctiva/sclera: Conjunctivae normal       Pupils: Pupils are equal, round, and reactive to light  Cardiovascular:      Rate and Rhythm: Normal rate and regular rhythm  Heart sounds: Murmur heard  Systolic murmur is present with a grade of 3/6  Pulmonary:      Effort: Pulmonary effort is normal  No respiratory distress  Breath sounds: Normal breath sounds     Abdominal:      Palpations: Abdomen is soft  Tenderness: There is no abdominal tenderness  There is no guarding or rebound  Musculoskeletal:         General: No tenderness  Normal range of motion  Cervical back: Full passive range of motion without pain, normal range of motion and neck supple  No spinous process tenderness  Skin:     General: Skin is warm and dry  Neurological:      Mental Status: She is alert and oriented to person, place, and time  Cranial Nerves: No cranial nerve deficit  Cervical spine cleared by clinical criteria? {YES/NO:84525}     Invasive Devices     None                 Lab Results:   Results Reviewed     Procedure Component Value Units Date/Time    CBC and differential [[id number]]     Lab Status: No result Specimen: Blood     Comprehensive metabolic panel [[id number]]     Lab Status: No result Specimen: Blood     HS Troponin 0hr (reflex protocol) [[id number]]     Lab Status: No result Specimen: Blood                  Imaging Studies:   Direct to CT: {YES/NO:60626}  XR Trauma chest portable    (Results Pending)   XR Trauma pelvis ap only 1 or 2 vw    (Results Pending)   TRAUMA - CT head wo contrast    (Results Pending)   TRAUMA - CT spine cervical wo contrast    (Results Pending)         Procedures  Procedures         ED Course           Medical Decision Making  The plan is to obtain a CT of the head and cervical spine to rule out clinically significant injury such as skull fracture, epidural or subdural hematoma  Will also rule out cervical spine fracture or dislocation  I will also obtain EKG chest x-ray and laboratories to rule out arrhythmia, pneumothorax, displaced rib fracture electrolyte disturbance, anemia, renal failure    Amount and/or Complexity of Data Reviewed  Labs: ordered  Radiology: ordered                    Disposition  [facility] Transfer: {YES/NO:55223}  Final diagnoses:   None     ED Disposition     None      Follow-up Information    None       Patient's Medications Discharge Prescriptions    No medications on file     No discharge procedures on file      PDMP Review     None          ED Provider  Electronically Signed by

## 2023-04-27 ENCOUNTER — TRANSCRIBE ORDERS (OUTPATIENT)
Dept: HOME HEALTH SERVICES | Facility: HOME HEALTHCARE | Age: 88
End: 2023-04-27

## 2023-04-27 ENCOUNTER — HOME HEALTH ADMISSION (OUTPATIENT)
Dept: HOME HEALTH SERVICES | Facility: HOME HEALTHCARE | Age: 88
End: 2023-04-27

## 2023-04-27 DIAGNOSIS — S72.001D FRACTURE OF UNSPECIFIED PART OF NECK OF RIGHT FEMUR, SUBSEQUENT ENCOUNTER FOR CLOSED FRACTURE WITH ROUTINE HEALING: Primary | ICD-10-CM

## 2023-04-27 DIAGNOSIS — J44.9 CHRONIC OBSTRUCTIVE PULMONARY DISEASE, UNSPECIFIED COPD TYPE (HCC): ICD-10-CM

## 2023-04-29 PROBLEM — N39.0 UTI (URINARY TRACT INFECTION): Status: RESOLVED | Noted: 2023-02-28 | Resolved: 2023-04-29

## 2023-05-01 ENCOUNTER — HOME CARE VISIT (OUTPATIENT)
Dept: HOME HEALTH SERVICES | Facility: HOME HEALTHCARE | Age: 88
End: 2023-05-01

## 2023-05-02 ENCOUNTER — HOME CARE VISIT (OUTPATIENT)
Dept: HOME HEALTH SERVICES | Facility: HOME HEALTHCARE | Age: 88
End: 2023-05-02

## 2023-05-02 VITALS — BODY MASS INDEX: 21.16 KG/M2 | WEIGHT: 112 LBS | RESPIRATION RATE: 18 BRPM

## 2023-05-03 ENCOUNTER — HOME CARE VISIT (OUTPATIENT)
Dept: HOME HEALTH SERVICES | Facility: HOME HEALTHCARE | Age: 88
End: 2023-05-03

## 2023-05-04 ENCOUNTER — APPOINTMENT (EMERGENCY)
Dept: CT IMAGING | Facility: HOSPITAL | Age: 88
End: 2023-05-04

## 2023-05-04 ENCOUNTER — HOME CARE VISIT (OUTPATIENT)
Dept: HOME HEALTH SERVICES | Facility: HOME HEALTHCARE | Age: 88
End: 2023-05-04

## 2023-05-04 ENCOUNTER — APPOINTMENT (EMERGENCY)
Dept: RADIOLOGY | Facility: HOSPITAL | Age: 88
End: 2023-05-04
Attending: EMERGENCY MEDICINE

## 2023-05-04 ENCOUNTER — APPOINTMENT (EMERGENCY)
Dept: RADIOLOGY | Facility: HOSPITAL | Age: 88
End: 2023-05-04

## 2023-05-04 ENCOUNTER — HOSPITAL ENCOUNTER (INPATIENT)
Facility: HOSPITAL | Age: 88
LOS: 1 days | Discharge: NURSING, REHAB OR LONG-TERM CARE | End: 2023-05-06
Attending: EMERGENCY MEDICINE | Admitting: FAMILY MEDICINE

## 2023-05-04 DIAGNOSIS — S73.016A: ICD-10-CM

## 2023-05-04 DIAGNOSIS — R06.89 ACUTE RESPIRATORY INSUFFICIENCY: ICD-10-CM
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
## 2023-05-04 DIAGNOSIS — R11.2 NAUSEA AND VOMITING: ICD-10-CM

## 2023-05-04 PROBLEM — R26.2 AMBULATORY DYSFUNCTION: Status: ACTIVE | Noted: 2023-05-04

## 2023-05-04 PROBLEM — Z96.641 HISTORY OF RIGHT HIP REPLACEMENT: Status: ACTIVE | Noted: 2023-05-04

## 2023-05-04 LAB
2HR DELTA HS TROPONIN: -2 NG/L
4HR DELTA HS TROPONIN: -2 NG/L
ABO GROUP BLD: NORMAL
ABO GROUP BLD: NORMAL
ANION GAP SERPL CALCULATED.3IONS-SCNC: 8 MMOL/L (ref 4–13)
BACTERIA UR QL AUTO: ABNORMAL /HPF
BASOPHILS # BLD AUTO: 0.06 THOUSANDS/ÂΜL (ref 0–0.1)
BASOPHILS NFR BLD AUTO: 1 % (ref 0–1)
BILIRUB UR QL STRIP: NEGATIVE
BLD GP AB SCN SERPL QL: NEGATIVE
BUN SERPL-MCNC: 34 MG/DL (ref 5–25)
CALCIUM SERPL-MCNC: 10 MG/DL (ref 8.4–10.2)
CARDIAC TROPONIN I PNL SERPL HS: 10 NG/L
CARDIAC TROPONIN I PNL SERPL HS: 10 NG/L
CARDIAC TROPONIN I PNL SERPL HS: 12 NG/L
CHLORIDE SERPL-SCNC: 106 MMOL/L (ref 96–108)
CLARITY UR: CLEAR
CO2 SERPL-SCNC: 26 MMOL/L (ref 21–32)
COLOR UR: YELLOW
CREAT SERPL-MCNC: 1.51 MG/DL (ref 0.6–1.3)
EOSINOPHIL # BLD AUTO: 0.26 THOUSAND/ÂΜL (ref 0–0.61)
EOSINOPHIL NFR BLD AUTO: 2 % (ref 0–6)
ERYTHROCYTE [DISTWIDTH] IN BLOOD BY AUTOMATED COUNT: 13.8 % (ref 11.6–15.1)
GFR SERPL CREATININE-BSD FRML MDRD: 30 ML/MIN/1.73SQ M
GLUCOSE SERPL-MCNC: 101 MG/DL (ref 65–140)
GLUCOSE SERPL-MCNC: 136 MG/DL (ref 65–140)
GLUCOSE SERPL-MCNC: 155 MG/DL (ref 65–140)
GLUCOSE SERPL-MCNC: 91 MG/DL (ref 65–140)
GLUCOSE UR STRIP-MCNC: NEGATIVE MG/DL
HCT VFR BLD AUTO: 38.6 % (ref 34.8–46.1)
HGB BLD-MCNC: 12.3 G/DL (ref 11.5–15.4)
HGB UR QL STRIP.AUTO: ABNORMAL
IMM GRANULOCYTES # BLD AUTO: 0.15 THOUSAND/UL (ref 0–0.2)
IMM GRANULOCYTES NFR BLD AUTO: 1 % (ref 0–2)
KETONES UR STRIP-MCNC: NEGATIVE MG/DL
LEUKOCYTE ESTERASE UR QL STRIP: ABNORMAL
LYMPHOCYTES # BLD AUTO: 3.43 THOUSANDS/ÂΜL (ref 0.6–4.47)
LYMPHOCYTES NFR BLD AUTO: 28 % (ref 14–44)
MCH RBC QN AUTO: 29.1 PG (ref 26.8–34.3)
MCHC RBC AUTO-ENTMCNC: 31.9 G/DL (ref 31.4–37.4)
MCV RBC AUTO: 91 FL (ref 82–98)
MONOCYTES # BLD AUTO: 0.88 THOUSAND/ÂΜL (ref 0.17–1.22)
MONOCYTES NFR BLD AUTO: 7 % (ref 4–12)
NEUTROPHILS # BLD AUTO: 7.28 THOUSANDS/ÂΜL (ref 1.85–7.62)
NEUTS SEG NFR BLD AUTO: 61 % (ref 43–75)
NITRITE UR QL STRIP: NEGATIVE
NON-SQ EPI CELLS URNS QL MICRO: ABNORMAL /HPF
NRBC BLD AUTO-RTO: 0 /100 WBCS
PH UR STRIP.AUTO: 6 [PH]
PLATELET # BLD AUTO: 285 THOUSANDS/UL (ref 149–390)
PMV BLD AUTO: 9.7 FL (ref 8.9–12.7)
POTASSIUM SERPL-SCNC: 3.7 MMOL/L (ref 3.5–5.3)
PROT UR STRIP-MCNC: ABNORMAL MG/DL
RBC # BLD AUTO: 4.23 MILLION/UL (ref 3.81–5.12)
RBC #/AREA URNS AUTO: ABNORMAL /HPF
RH BLD: NEGATIVE
RH BLD: NEGATIVE
SODIUM SERPL-SCNC: 140 MMOL/L (ref 135–147)
SP GR UR STRIP.AUTO: 1.01 (ref 1–1.03)
SPECIMEN EXPIRATION DATE: NORMAL
UROBILINOGEN UR STRIP-ACNC: <2 MG/DL
WBC # BLD AUTO: 12.06 THOUSAND/UL (ref 4.31–10.16)
WBC #/AREA URNS AUTO: ABNORMAL /HPF

## 2023-05-04 PROCEDURE — 0SW9XJZ REVISION OF SYNTHETIC SUBSTITUTE IN RIGHT HIP JOINT, EXTERNAL APPROACH: ICD-10-PCS | Performed by: EMERGENCY MEDICINE

## 2023-05-04 RX ORDER — LEVOTHYROXINE SODIUM 0.1 MG/1
100 TABLET ORAL DAILY
Status: DISCONTINUED | OUTPATIENT
Start: 2023-05-04 | End: 2023-05-06 | Stop reason: HOSPADM

## 2023-05-04 RX ORDER — FENTANYL CITRATE 50 UG/ML
1 INJECTION, SOLUTION INTRAMUSCULAR; INTRAVENOUS ONCE
Status: COMPLETED | OUTPATIENT
Start: 2023-05-04 | End: 2023-05-04

## 2023-05-04 RX ORDER — ACETAMINOPHEN 325 MG/1
650 TABLET ORAL EVERY 6 HOURS PRN
Status: DISCONTINUED | OUTPATIENT
Start: 2023-05-04 | End: 2023-05-06 | Stop reason: HOSPADM

## 2023-05-04 RX ORDER — INSULIN LISPRO 100 [IU]/ML
1-6 INJECTION, SOLUTION INTRAVENOUS; SUBCUTANEOUS
Status: DISCONTINUED | OUTPATIENT
Start: 2023-05-04 | End: 2023-05-06 | Stop reason: HOSPADM

## 2023-05-04 RX ORDER — ONDANSETRON 2 MG/ML
1 INJECTION INTRAMUSCULAR; INTRAVENOUS ONCE
Status: COMPLETED | OUTPATIENT
Start: 2023-05-04 | End: 2023-05-04

## 2023-05-04 RX ORDER — LEVALBUTEROL 1.25 MG/.5ML
1.25 SOLUTION, CONCENTRATE RESPIRATORY (INHALATION) EVERY 8 HOURS PRN
Status: DISCONTINUED | OUTPATIENT
Start: 2023-05-04 | End: 2023-05-04

## 2023-05-04 RX ORDER — ASPIRIN 81 MG/1
81 TABLET ORAL DAILY
Status: DISCONTINUED | OUTPATIENT
Start: 2023-05-04 | End: 2023-05-06 | Stop reason: HOSPADM

## 2023-05-04 RX ORDER — LEVALBUTEROL INHALATION SOLUTION 1.25 MG/3ML
1.25 SOLUTION RESPIRATORY (INHALATION) EVERY 8 HOURS PRN
Status: DISCONTINUED | OUTPATIENT
Start: 2023-05-04 | End: 2023-05-06 | Stop reason: HOSPADM

## 2023-05-04 RX ORDER — ONDANSETRON 2 MG/ML
4 INJECTION INTRAMUSCULAR; INTRAVENOUS ONCE
Status: DISCONTINUED | OUTPATIENT
Start: 2023-05-04 | End: 2023-05-06 | Stop reason: HOSPADM

## 2023-05-04 RX ORDER — ONDANSETRON 2 MG/ML
4 INJECTION INTRAMUSCULAR; INTRAVENOUS ONCE
Status: COMPLETED | OUTPATIENT
Start: 2023-05-04 | End: 2023-05-04

## 2023-05-04 RX ORDER — ACETAMINOPHEN 500 MG
500 TABLET ORAL EVERY 6 HOURS PRN
COMMUNITY

## 2023-05-04 RX ORDER — FENTANYL CITRATE 50 UG/ML
50 INJECTION, SOLUTION INTRAMUSCULAR; INTRAVENOUS ONCE
Status: COMPLETED | OUTPATIENT
Start: 2023-05-04 | End: 2023-05-04

## 2023-05-04 RX ORDER — HEPARIN SODIUM 5000 [USP'U]/ML
5000 INJECTION, SOLUTION INTRAVENOUS; SUBCUTANEOUS EVERY 8 HOURS SCHEDULED
Status: DISCONTINUED | OUTPATIENT
Start: 2023-05-04 | End: 2023-05-06 | Stop reason: HOSPADM

## 2023-05-04 RX ORDER — LEVALBUTEROL INHALATION SOLUTION 1.25 MG/3ML
SOLUTION RESPIRATORY (INHALATION)
Status: COMPLETED
Start: 2023-05-04 | End: 2023-05-04

## 2023-05-04 RX ORDER — PROPOFOL 10 MG/ML
50 INJECTION, EMULSION INTRAVENOUS ONCE
Status: COMPLETED | OUTPATIENT
Start: 2023-05-04 | End: 2023-05-04

## 2023-05-04 RX ADMIN — INSULIN LISPRO 1 UNITS: 100 INJECTION, SOLUTION INTRAVENOUS; SUBCUTANEOUS at 17:08

## 2023-05-04 RX ADMIN — UMECLIDINIUM 1 PUFF: 62.5 AEROSOL, POWDER ORAL at 15:12

## 2023-05-04 RX ADMIN — PROPOFOL 25 MG: 10 INJECTION, EMULSION INTRAVENOUS at 07:36

## 2023-05-04 RX ADMIN — ASPIRIN 81 MG: 81 TABLET, COATED ORAL at 14:01

## 2023-05-04 RX ADMIN — LEVALBUTEROL HYDROCHLORIDE 1.25 MG: 1.25 SOLUTION RESPIRATORY (INHALATION) at 20:49

## 2023-05-04 RX ADMIN — ACETAMINOPHEN 325MG 650 MG: 325 TABLET ORAL at 22:09

## 2023-05-04 RX ADMIN — PROPOFOL 50 MG: 10 INJECTION, EMULSION INTRAVENOUS at 07:37

## 2023-05-04 RX ADMIN — HEPARIN SODIUM 5000 UNITS: 5000 INJECTION INTRAVENOUS; SUBCUTANEOUS at 14:11

## 2023-05-04 RX ADMIN — LEVALBUTEROL INHALATION SOLUTION 1.25 MG: 1.25 SOLUTION RESPIRATORY (INHALATION) at 20:49

## 2023-05-04 RX ADMIN — SODIUM CHLORIDE 500 ML: 0.9 INJECTION, SOLUTION INTRAVENOUS at 07:30

## 2023-05-04 RX ADMIN — HEPARIN SODIUM 5000 UNITS: 5000 INJECTION INTRAVENOUS; SUBCUTANEOUS at 22:09

## 2023-05-04 RX ADMIN — ONDANSETRON 4 MG: 2 INJECTION INTRAMUSCULAR; INTRAVENOUS at 05:48

## 2023-05-04 RX ADMIN — LEVOTHYROXINE SODIUM 100 MCG: 100 TABLET ORAL at 14:01

## 2023-05-04 RX ADMIN — FENTANYL CITRATE 25 MCG: 50 INJECTION, SOLUTION INTRAMUSCULAR; INTRAVENOUS at 07:30

## 2023-05-04 NOTE — PROGRESS NOTES
Patient transported to [facility] room 229  Report given to [facility]  Patient has upper dentures only at bedside  All other belongings were taken home with son Taj Avalos  RN left voicemail for jeremías Avalos to notify of transfer to  [facility]

## 2023-05-04 NOTE — H&P
[place]  H&P  Name: Mary Adrian 80 y o  female I MRN: [id number]  Unit/Bed#: [facility] 304-01 I Date of Admission: 5/4/2023   Date of Service: 5/4/2023 I Hospital Day: 0      Assessment/Plan   History of right hip replacement  Assessment & Plan  Right hip surgery done on 4/9 at [address]  Was due to remove staples at this time  Patient s/p fall at home and hip displacement  Was manually reduced in ER today  Pain is controlled  PT/OT consulted  [facility] consulted as well  COPD (chronic obstructive pulmonary disease) ([facility])  Assessment & Plan  Stable    Hypothyroidism  Assessment & Plan  Continue Levothyroxine    Type 2 diabetes mellitus, without long-term current use of insulin ([facility])  Assessment & Plan  No results found for: HGBA1C    Recent Labs     05/04/23  1359 05/04/23  1618   POCGLU 91 155*       Blood Sugar Average: Last 72 hrs:  (P) 123     Relatively good control for patient's age  Will provide with diabetic diet and ISS  CKD (chronic kidney disease) stage 3, GFR 30-59 ml/min [facility])  Assessment & Plan  Lab Results   Component Value Date    EGFR 30 05/04/2023    EGFR 29 03/26/2023    EGFR 25 03/02/2023    CREATININE 1 51 (H) 05/04/2023    CREATININE 1 57 (H) 03/26/2023    CREATININE 1 74 (H) 03/02/2023   Baseline creatinine  Avoid nephrotoxic agents    * Ambulatory dysfunction  Assessment & Plan  Frail patient with recent hip replacement   Just got home from the nursing facility and now is here due to fall and hip displacement  Was reduced in ER and is waiting for PT/OT and placement  She would benefit from rehab or nursing facility care in the long run  VTE Prophylaxis: Heparin  / sequential compression device   Code Status: DNR/DNI  POLST: POLST form is not discussed and not completed at this time    Discussion with family: left a message for patient's son    Anticipated Length of Stay:  Patient will be admitted on an Observation basis with an anticipated length of stay of  Less than 2 midnights  Justification for Hospital Stay: Placement    Total Time for Visit, including Counseling / Coordination of Care: 45 minutes  Greater than 50% of this total time spent on direct patient counseling and coordination of care  Chief Complaint:   Fall with the hip displacement    History of Present Illness:    Milka Verma is a 80 y o  female history of diabetes , COPD , ambulatory dysfunction , right hip surgery with recent fall who presents with recent fall  Patient had a hip replacement surgery performed at [facility] in the beginning of April  She was supposed to follow-up with her orthopedic surgeon this week for staples to be removed  Unfortunately she had a fall today  She was recently discharged from the rehab facility where she was cared for after her hip replacement surgery  Patient came with displaced hip which was manually reduced in the ER today  She was also found to be hypoxic in the emergency room, she is currently stable on room air  Patient is quite frail and able to feed herself due to poor coordination of her hands  Review of Systems:    Review of Systems   Constitutional: Positive for activity change  Negative for appetite change, fatigue and fever  Respiratory: Negative for shortness of breath  Cardiovascular: Negative for chest pain and leg swelling  Gastrointestinal: Negative for abdominal pain  Genitourinary: Negative for dysuria and flank pain  Musculoskeletal: Positive for gait problem  Negative for back pain  Skin: Negative for pallor  Neurological: Negative for weakness, light-headedness and numbness  Psychiatric/Behavioral: Negative for behavioral problems  Past Medical and Surgical History:     Past Medical History:   Diagnosis Date   • COPD (chronic obstructive pulmonary disease) ([facility] [address] )    • Diabetes mellitus ([facility] )    • Disease of thyroid gland    • Hypertension        History reviewed   No pertinent surgical history  Meds/Allergies:    Prior to Admission medications    Medication Sig Start Date End Date Taking? Authorizing Provider   acetaminophen (TYLENOL) 500 mg tablet Take 500 mg by mouth every 6 (six) hours as needed for mild pain (unsure dosage)   Yes Historical Provider, MD   aspirin (ECOTRIN LOW STRENGTH) 81 mg EC tablet Take 81 mg by mouth   Yes Historical Provider, MD   levothyroxine 100 mcg tablet Take 100 mcg by mouth daily   Yes Historical Provider, MD   nitrofurantoin (MACROBID) 100 mg capsule Take 100 mg by mouth 2 (two) times a day  Indications: Simple Infection of the Urinary Tract 5/2/23 5/7/23 Yes Historical Provider, MD   sitaGLIPtin (JANUVIA) 25 mg tablet  1/25/23  Yes Historical Provider, MD   tiotropium (Spiriva Respimat) 2 5 MCG/ACT AERS inhaler Inhale 2 puffs daily 12/16/22  Yes Historical Provider, MD   albuterol (PROVENTIL HFA,VENTOLIN HFA) 90 mcg/act inhaler inhale 2 puff by mouth and INTO THE LUNGS every 4 hours if needed for wheezing  Patient not taking: Reported on 5/4/2023 12/16/22   Historical Provider, MD   Fesoterodine Fumarate ER 8 MG TB24 Take 8 mg by mouth daily  Patient not taking: Reported on 5/4/2023 2/6/23 2/1/24  Historical Provider, MD     I have reviewed home medications using allscripts  Allergies: Allergies   Allergen Reactions   • Erythromycin Other (See Comments)     unknown   • Iodine - Food Allergy Hives   • Lidocaine Hives   • Metformin Diarrhea   • Penicillin V Hives     TOLERATED CEFTRIAXONE 12/2022   • Rosuvastatin Myalgia   • Sulfamethoxazole-Trimethoprim Hives       Social History:     Marital Status:     Occupation: retired  Patient Pre-hospital Living Situation: home  Patient Pre-hospital Level of Mobility: Difficulty ambulating  Patient Pre-hospital Diet Restrictions: Diabetic  Substance Use History:   Social History     Substance and Sexual Activity   Alcohol Use Never     Social History     Tobacco Use   Smoking Status Former "  • Types: Cigarettes   • Quit date: 2022   • Years since quittin 3   Smokeless Tobacco Not on file     Social History     Substance and Sexual Activity   Drug Use Never       Family History:    non-contributory    Physical Exam:     Vitals:   Blood Pressure: 139/65 (23)  Pulse: 85 (23)  Temperature: 98 8 °F (37 1 °C) (23)  Temp Source: Oral (23)  Respirations: 16 (23)  Height: 5' 1\" (154 9 cm) (23)  Weight - Scale: 57 4 kg (126 lb 8 7 oz) (23)  SpO2: 93 % (23)    Physical Exam  Constitutional:       Appearance: She is well-developed  Comments: Frail   HENT:      Head: Normocephalic and atraumatic  Cardiovascular:      Rate and Rhythm: Normal rate and regular rhythm  Pulmonary:      Effort: Pulmonary effort is normal       Breath sounds: Normal breath sounds  Abdominal:      General: There is no distension  Palpations: Abdomen is soft  Musculoskeletal:         General: Deformity (Right hip replacement) present  Skin:     General: Skin is warm  Findings: No erythema  Comments: Surgical staples at the right hip site   Neurological:      Mental Status: She is alert  Gait: Gait abnormal    Psychiatric:         Behavior: Behavior normal            Additional Data:     Lab Results: I have personally reviewed pertinent reports        Results from last 7 days   Lab Units 23  0425   WBC Thousand/uL 12 06*   HEMOGLOBIN g/dL 12 3   HEMATOCRIT % 38 6   PLATELETS Thousands/uL 285   NEUTROS PCT % 61   LYMPHS PCT % 28   MONOS PCT % 7   EOS PCT % 2     Results from last 7 days   Lab Units 23  0425   SODIUM mmol/L 140   POTASSIUM mmol/L 3 7   CHLORIDE mmol/L 106   CO2 mmol/L 26   BUN mg/dL 34*   CREATININE mg/dL 1 51*   ANION GAP mmol/L 8   CALCIUM mg/dL 10 0   GLUCOSE RANDOM mg/dL 136         Results from last 7 days   Lab Units 23  1618 23  1359   POC GLUCOSE mg/dl 155* 91      " Imaging: I have personally reviewed pertinent reports  XR pelvis ap only 1 or 2 vw   Final Result by Rex Resendez MD (05/04 1230)      Reduced right hip prosthetic dislocation      Workstation performed: [id number]         XR pelvis ap only 1 or 2 vw   Final Result by Jim Martinez MD (05/04 1305)      Superiorly dislocated right hip prosthesis  No evidence for fracture  Workstation performed: [id number]         TRAUMA - CT spine cervical wo contrast   Final Result by Justin Wyman MD (05/04 5549)      No cervical spine fracture or traumatic malalignment  Workstation performed: [id number]         CT pelvis wo contrast   Final Result by Justin Wyman MD (05/04 4904)      Posterior and superior dislocation of the proximal right femur, including the patient's femoral and acetabular total hip arthroplasty hardware  No acute fracture  There is mild enlargement and edema of the right gluteal musculature when compared to the left likely post traumatic in nature  No free air or free fluid within the pelvis  Workstation performed: [id number]         TRAUMA - CT head wo contrast   Final Result by Justin Wyman MD (05/04 0952)      No acute intracranial abnormality  Workstation performed: [id number]         XR Trauma chest portable   ED Interpretation by Vannesa Rodrigez DO (05/04 2006)   This study was ordered and independently reviewed by me    No acute findings noted           Final Result by Justin Wyman MD (05/04 2413)      No acute cardiopulmonary disease  Workstation performed: [id number]             EKG, Pathology, and Other Studies Reviewed on Admission:   · EKG: n/a    Allscripts / Epic Records Reviewed: Yes     ** Please Note: This note has been constructed using a voice recognition system   **

## 2023-05-04 NOTE — ASSESSMENT & PLAN NOTE
Right hip surgery done on 4/9 at [facility]  Was due to remove staples at this time  Patient s/p fall at home and hip displacement  Was manually reduced in ER today  Pain is controlled  PT/OT consulted  [facility] Lora ESPINOZA consulted as well

## 2023-05-04 NOTE — ASSESSMENT & PLAN NOTE
Lab Results   Component Value Date    EGFR 30 05/04/2023    EGFR 29 03/26/2023    EGFR 25 03/02/2023    CREATININE 1 51 (H) 05/04/2023    CREATININE 1 57 (H) 03/26/2023    CREATININE 1 74 (H) 03/02/2023   Baseline creatinine  Avoid nephrotoxic agents

## 2023-05-04 NOTE — ASSESSMENT & PLAN NOTE
No results found for: HGBA1C    Recent Labs     05/04/23  1359 05/04/23  1618   POCGLU 91 155*       Blood Sugar Average: Last 72 hrs:  (P) 123     Relatively good control for patient's age  Will provide with diabetic diet and ISS

## 2023-05-04 NOTE — PROGRESS NOTES
Per patient's chart, patient has an allergy to asprin and ibuprofen  RN asked patient and patient's son, Taya [place], if patient is allergic to Asprin and ibuprofen  Patient and son state patient does NOT have allergy to Asprin and ibuprofen   Dr Ann Rondon made aware, RN corrected allergy list      Ayanna Cortes RN

## 2023-05-04 NOTE — ED PROVIDER NOTES
Emergency Department Trauma Note  Milka Verma 80 y o  female MRN: [id number]  Unit/Bed#: ED 06/ED 06 Encounter: [id number]      Trauma Alert: Trauma Acuity: Trauma Evaluation  Model of Arrival: Mode of Arrival: ALS via    Trauma Team: Current Providers  Attending Provider: Kelly Silva DO  Attending Provider: Josiane Brown MD  Registered Nurse: Jose Cespedes, RN  Registered Nurse: José Antonio Rosario, RN  Consultants:     Orthopedics: routine consult; Epic consult order placed;      None      History of Present Illness     Chief Complaint:   Chief Complaint   Patient presents with   • Fall     Presents via EMS from home s/p fall from bed  [place]  Confused at baseline  On ASA  Fentanyl and zofran given pta     HPI:  Milka Verma is a 80 y o  female who presents with fall  Mechanism:Details of Incident: fall from bed Injury Date: 05/04/23        80year-old female with history of COPD, diabetes, hypertension presents for evaluation of fall when she rolled out of bed, found herself on the floor  Complains of right hip pain  she is on aspirin no current headache unknown head strike  Currently baseline mental status, very difficult of hearing  Did receive 50 mcg of fentanyl PTA via EMS  Notes reviewed patient had telemedicine PCP visit yesterday, started on Zofran, Pepcid for GERD, stable on current outpatient medications  Review of Systems   Musculoskeletal:        Right hip pain       Historical Information     Immunizations:   Immunization History   Administered Date(s) Administered   • COVID-19 PFIZER VACCINE 0 3 ML IM 03/12/2021, 04/02/2021       Past Medical History:   Diagnosis Date   • COPD (chronic obstructive pulmonary disease) ([facility] [address] )    • Diabetes mellitus ([facility] [address] )    • Disease of thyroid gland    • Hypertension      History reviewed  No pertinent family history  History reviewed  No pertinent surgical history    Social History     Tobacco Use   • Smoking status: Unknown   Vaping Use   • Vaping Use: Never used   Substance Use Topics   • Alcohol use: Never   • Drug use: Never     E-Cigarette/Vaping   • E-Cigarette Use Never User      E-Cigarette/Vaping Substances       Family History: non-contributory    Meds/Allergies   Prior to Admission Medications   Prescriptions Last Dose Informant Patient Reported? Taking? Fesoterodine Fumarate ER 8 MG TB24   Yes No   Sig: Take 8 mg by mouth daily   albuterol (PROVENTIL HFA,VENTOLIN HFA) 90 mcg/act inhaler   Yes No   Sig: inhale 2 puff by mouth and INTO THE LUNGS every 4 hours if needed for wheezing   aspirin (ECOTRIN LOW STRENGTH) 81 mg EC tablet   Yes No   Sig: Take 81 mg by mouth   levothyroxine 100 mcg tablet   Yes No   Sig: Take 100 mcg by mouth daily   nitrofurantoin (MACROBID) 100 mg capsule   Yes No   Sig: Take 100 mg by mouth 2 (two) times a day  Indications: Simple Infection of the Urinary Tract   sitaGLIPtin (Januvia) 25 mg tablet   Yes No   tiotropium (Spiriva Respimat) 2 5 MCG/ACT AERS inhaler   Yes No   Sig: Inhale 2 puffs 2 (two) times a day      Facility-Administered Medications: None       Allergies   Allergen Reactions   • Aspirin GI Intolerance   • Erythromycin Other (See Comments)     unknown   • Ibuprofen Hives   • Iodine - Food Allergy Hives   • Lidocaine Hives   • Metformin Diarrhea   • Penicillin V Hives     TOLERATED CEFTRIAXONE 12/2022   • Rosuvastatin Myalgia   • Sulfamethoxazole-Trimethoprim Hives       PHYSICAL EXAM    PE limited by: none    Objective   Vitals:   First set: Temperature: 97 5 °F (36 4 °C) (05/04/23 0420)  Pulse: 72 (05/04/23 0420)  Respirations: 18 (05/04/23 0420)  Blood Pressure: (!) 197/95 (05/04/23 0420)  SpO2: 94 % (05/04/23 0420)    Primary Survey:   (A) Airway: intact  (B) Breathing: cta b/l  (C) Circulation: Pulses:   normal  (D) Disabliity:  GCS Total:  15  (E) Expose:  Completed    Secondary Survey: (Click on Physical Exam tab above)  Physical Exam  HENT:      Head: Normocephalic and atraumatic        Right Ear: Tympanic membrane normal       Left Ear: Tympanic membrane normal       Nose: Nose normal    Eyes:      Extraocular Movements: Extraocular movements intact  Pupils: Pupils are equal, round, and reactive to light  Neck:      Comments: No midline cervical tenderness immobilized in cervical collar  Cardiovascular:      Rate and Rhythm: Normal rate  Pulmonary:      Effort: Pulmonary effort is normal  No respiratory distress  Chest:      Chest wall: No tenderness  Musculoskeletal:      Comments: Tenderness to palpation over right hip, well-healing incision noted over right hip with staple line intact    extremity is somewhat shortened and internally rotated still he neurovascularly intact  No midline t or l spine tenderness   Skin:     General: Skin is warm  Capillary Refill: Capillary refill takes less than 2 seconds  Neurological:      Mental Status: She is alert  Cervical spine cleared by clinical criteria?  No (imaging required)      Invasive Devices     Peripheral Intravenous Line  Duration           Peripheral IV 05/04/23 Left Antecubital <1 day                Lab Results:   Results Reviewed     Procedure Component Value Units Date/Time    HS Troponin I 2hr [[id number]]  (Normal) Collected: 05/04/23 0612    Lab Status: Final result Specimen: Blood from Line, Arterial Updated: 05/04/23 0642     hs TnI 2hr 10 ng/L      Delta 2hr hsTnI -2 ng/L     HS Troponin I 4hr [[id number]]     Lab Status: No result Specimen: Blood     HS Troponin 0hr (reflex protocol) [[id number]]  (Normal) Collected: 05/04/23 0425    Lab Status: Final result Specimen: Blood from Arm, Left Updated: 05/04/23 0455     hs TnI 0hr 12 ng/L     Basic metabolic panel [[id number]]  (Abnormal) Collected: 05/04/23 0425    Lab Status: Final result Specimen: Blood from Arm, Left Updated: 05/04/23 0449     Sodium 140 mmol/L      Potassium 3 7 mmol/L      Chloride 106 mmol/L      CO2 26 mmol/L      ANION GAP 8 mmol/L      BUN 34 mg/dL Creatinine 1 51 mg/dL      Glucose 136 mg/dL      Calcium 10 0 mg/dL      eGFR 30 ml/min/1 73sq m     Narrative:      National Kidney Disease Foundation guidelines for Chronic Kidney Disease (CKD):   •  Stage 1 with normal or high GFR (GFR > 90 mL/min/1 73 square meters)  •  Stage 2 Mild CKD (GFR = 60-89 mL/min/1 73 square meters)  •  Stage 3A Moderate CKD (GFR = 45-59 mL/min/1 73 square meters)  •  Stage 3B Moderate CKD (GFR = 30-44 mL/min/1 73 square meters)  •  Stage 4 Severe CKD (GFR = 15-29 mL/min/1 73 square meters)  •  Stage 5 End Stage CKD (GFR <15 mL/min/1 73 square meters)  Note: GFR calculation is accurate only with a steady state creatinine    CBC and differential [[id number]]  (Abnormal) Collected: 05/04/23 0425    Lab Status: Final result Specimen: Blood from Arm, Left Updated: 05/04/23 0431     WBC 12 06 Thousand/uL      RBC 4 23 Million/uL      Hemoglobin 12 3 g/dL      Hematocrit 38 6 %      MCV 91 fL      MCH 29 1 pg      MCHC 31 9 g/dL      RDW 13 8 %      MPV 9 7 fL      Platelets 051 Thousands/uL      nRBC 0 /100 WBCs      Neutrophils Relative 61 %      Immat GRANS % 1 %      Lymphocytes Relative 28 %      Monocytes Relative 7 %      Eosinophils Relative 2 %      Basophils Relative 1 %      Neutrophils Absolute 7 28 Thousands/µL      Immature Grans Absolute 0 15 Thousand/uL      Lymphocytes Absolute 3 43 Thousands/µL      Monocytes Absolute 0 88 Thousand/µL      Eosinophils Absolute 0 26 Thousand/µL      Basophils Absolute 0 06 Thousands/µL     UA w Reflex to Microscopic w Reflex to Culture [[id number]]     Lab Status: No result Specimen: Urine                  Imaging Studies:   Direct to CT: No  TRAUMA - CT spine cervical wo contrast   Final Result by Jessy Nielsen MD (05/04 8477)      No cervical spine fracture or traumatic malalignment                 Workstation performed: [id number]         CT pelvis wo contrast   Final Result by Jessy Nielsen MD (65/63 5962)      Posterior and superior dislocation of the proximal right femur, including the patient's femoral and acetabular total hip arthroplasty hardware  No acute fracture  There is mild enlargement and edema of the right gluteal musculature when compared to the left likely post traumatic in nature  No free air or free fluid within the pelvis  Workstation performed: [id number]         TRAUMA - CT head wo contrast   Final Result by Ca Marie MD (05/04 1849)      No acute intracranial abnormality  Workstation performed: [id number]         XR Trauma chest portable   ED Interpretation by Josué Panchal DO (05/04 9279)   This study was ordered and independently reviewed by me    No acute findings noted           Final Result by Ca Marie MD (05/04 1853)      No acute cardiopulmonary disease  Workstation performed: [id number]         XR pelvis ap only 1 or 2 vw    (Results Pending)   XR pelvis ap only 1 or 2 vw    (Results Pending)         Procedures  Orthopedic injury treatment    Date/Time: 5/4/2023 7:43 AM  Performed by: Josué Panchal DO  Authorized by: Josué Panchal DO     Patient Location:  ED  [place] Protocol:  Procedure performed by: (Dr Star Vazquez)  Consent given by: power of  and patient  Required items: required blood products, implants, devices, and special equipment available  Patient identity confirmed: verbally with patient      Injury location:  Hip  Injury type:  Dislocation  Dislocation type: posterior    Prosthesis?: Yes    Neurovascular status: Neurovascularly intact    Range of motion: reduced    Local anesthesia used?: No    Sedation type:   Moderate (conscious) sedation (See separate Procedural Sedation form)  Manipulation performed?: Yes    Reduction method:  Extension, abduction and traction and counter traction  Reduction method:  Extension, abduction and traction and counter traction  Reduction method:  Extension, abduction and traction and counter traction  Reduction method:  Extension, abduction and traction and counter traction  Reduction method:  Extension, abduction and traction and counter traction  Reduction method:  Extension, abduction and traction and counter traction  Distal perfusion: normal    Neurological function: normal    Range of motion: normal    Patient tolerance:  Patient tolerated the procedure well with no immediate complications  Procedural Sedation    Date/Time: 5/4/2023 7:44 AM  Performed by: Bridgette Johns DO  Authorized by: Bridgette Johns DO     Procedure details (see MAR for exact dosages):     Preoxygenation:  Nonrebreather mask    Sedation:  Propofol    Analgesia:  Fentanyl    Intra-procedure monitoring:  Blood pressure monitoring, continuous capnometry, frequent LOC assessments, cardiac monitor, continuous pulse oximetry and frequent vital sign checks    Intra-procedure events: none      Total sedation time (minutes):  35  Post-procedure details:     Post-sedation assessments completed and reviewed: airway patency, cardiovascular function, hydration status, mental status, nausea/vomiting, pain level, respiratory function and temperature      Patient tolerance:   Tolerated well, no immediate complications  CriticalCare Time    Date/Time: 5/4/2023 7:45 AM  Performed by: Bridgette Johns DO  Authorized by: Bridgette Johns DO     Critical care provider statement:     Critical care time (minutes):  40    Critical care time was exclusive of:  Separately billable procedures and treating other patients and teaching time    Critical care was necessary to treat or prevent imminent or life-threatening deterioration of the following conditions:  Trauma    Critical care was time spent personally by me on the following activities:  Blood draw for specimens, obtaining history from patient or surrogate, development of treatment plan with patient or surrogate, discussions with consultants, evaluation of patient's response to treatment, examination of patient, ordering and performing treatments and interventions, ordering and review of laboratory studies, ordering and review of radiographic studies, re-evaluation of patient's condition and review of old charts    I assumed direction of critical care for this patient from another provider in my specialty: no        Conscious Sedation Assessment    Flowsheet Row Classification Score   ASA Scale Assessment 3-Severe systemic disease that results in functional limitation filed at 05/04/2023 0734             ED Course  ED Course as of 05/04/23 0804   Thu May 04, 2023   0438 Procedure Note: EKG  Date/Time: 05/04/23 4:38 AM   Performed by: Anuj Wong  Authorized by: Anuj Wong  Indications / Diagnosis: CP  ECG reviewed by me, the ED Provider: yes   The EKG demonstrates:  Rhythm: normal sinus  Intervals: normal intervals  Axis: normal axis  QRS/Blocks: normal QRS  ST Changes: No acute ST Changes, no STD/CHERYL        0459 Creatinine(!): 1 51  Baseline   0459 Patient did have an episode of vomiting when being wheeled to CAT scan, given Zofran   0524 Cervical collar cleared   0532 Patient with right hip dislocation more recent surgery 1 month  (4/10/23) ago at [facility] [address] will contact orthopedic surgeon for possible transfer   0533 Further history provided by son who is now at bedside, apparently patient was sitting next to the bed fell out of bed at some point in the night no reported head strike   0543 Contacted [facility] to arrange for transfer   0612 Spoke to Dr Juan Diego La , [address], requests we attempt to reduce the fracture, and high risk with history of COPD, CHF, the saturations even with 50 mcg of fentanyl, will contact our orthopedic on-call   2148 Contacted orthopedic surgery on-call Dr Salud Aguayo for further recommendations   2769 Discussed with orthopedics on-call, shared x-rays, patient with right hip hemiarthroplasty per orthopedics okay to attempt reduction in the emergency department if unable will have to transfer to [address] [place] [address], this was discussed with the patient's son who is the POA who consents to the procedure will wait for a m  physician for conscious sedation as patient is high risk   8529 And with postprocedural hypoxia however was also saturating 91% on 2 L nasal cannula preprocedure unclear if it is due to aspiration versus medication side effect will admit for further evaluation           Medical Decision Making  43-year-old female status post fall differential diagnosis includes hip fracture, hip dislocation, ACS, arrhythmia, pneumonia urinary tract infection     will obtain lab work, imaging will reevaluate    Amount and/or Complexity of Data Reviewed  Labs: ordered  Radiology: ordered  Disposition  [facility] Transfer: No  Final diagnoses:   Fall, initial encounter   Closed posterior dislocation of hip, initial encounter ([facility] )   Nausea and vomiting   Acute respiratory insufficiency     Time reflects when diagnosis was documented in both MDM as applicable and the Disposition within this note     Time User Action Codes Description Comment    5/4/2023  5:42 AM Plascencia Pupa Add [I04  PJUI] Fall, initial encounter     5/4/2023  5:43 AM Plascencia Pupa Add [A98 475K] Closed posterior dislocation of hip, initial encounter ([facility] )     5/4/2023  7:45 AM Plascencia Pupa Add [R11 2] Nausea and vomiting     5/4/2023  7:46 AM Plascencia Pupa Add [R06 89] Acute respiratory insufficiency       ED Disposition     ED Disposition   Admit    Condition   Stable    Date/Time   Thu May 4, 2023  8:02 AM    Comment   Case was discussed with PARAG and the patient's admission status was agreed to be Admission Status: observation status to the service of Dr Stephanie Cool   Follow-up Information    None       Patient's Medications   Discharge Prescriptions    No medications on file     No discharge procedures on file      PDMP Review     None          ED Provider  Electronically Signed by         Josh Major, DO  05/04/23 7837

## 2023-05-04 NOTE — ASSESSMENT & PLAN NOTE
[place] patient with recent hip replacement   Just got home from the nursing facility and now is here due to fall and hip displacement  Was reduced in ER and is waiting for PT/OT and placement  She would benefit from rehab or nursing facility care in the long run

## 2023-05-04 NOTE — PLAN OF CARE
Problem: Potential for Falls  Goal: Patient will remain free of falls  Description: INTERVENTIONS:  - Educate patient/family on patient safety including physical limitations  - Instruct patient to call for assistance with activity   - Consult OT/PT to assist with strengthening/mobility   - Keep Call bell within reach  - Keep bed low and locked with side rails adjusted as appropriate  - Keep care items and personal belongings within reach  - Initiate and maintain comfort rounds  - Make Fall Risk Sign visible to staff  - Offer Toileting every 2 Hours, in advance of need  - Initiate/Maintain bed alarm  - Obtain necessary fall risk management equipment  Problem: PAIN - ADULT  Goal: Verbalizes/displays adequate comfort level or baseline comfort level  Description: Interventions:  - Encourage patient to monitor pain and request assistance  - Assess pain using appropriate pain scale  - Administer analgesics based on type and severity of pain and evaluate response  - Implement non-pharmacological measures as appropriate and evaluate response  - Consider cultural and social influences on pain and pain management  - Notify physician/advanced practitioner if interventions unsuccessful or patient reports new pain  Outcome: Progressing     Problem: INFECTION - ADULT  Goal: Absence or prevention of progression during hospitalization  Description: INTERVENTIONS:  - Assess and monitor for signs and symptoms of infection  - Monitor lab/diagnostic results  - Monitor all insertion sites, i e  indwelling lines, tubes, and drains  - Monitor endotracheal if appropriate and nasal secretions for changes in amount and color  - [place] appropriate cooling/warming therapies per order  - Administer medications as ordered  - Instruct and encourage patient and family to use good hand hygiene technique  - Identify and instruct in appropriate isolation precautions for identified infection/condition  Outcome: Progressing  Goal: Absence of fever/infection during neutropenic period  Description: INTERVENTIONS:  - Monitor WBC    Outcome: Progressing     Problem: SAFETY ADULT  Goal: Patient will remain free of falls  Description: INTERVENTIONS:  - Educate patient/family on patient safety including physical limitations  - Instruct patient to call for assistance with activity   - Consult OT/PT to assist with strengthening/mobility   - Keep Call bell within reach  - Keep bed low and locked with side rails adjusted as appropriate  - Keep care items and personal belongings within reach  - Initiate and maintain comfort rounds  - Make Fall Risk Sign visible to staff    - Apply yellow socks and bracelet for high fall risk patients  - Consider moving patient to room near nurses station  Outcome: Progressing  Goal: Maintain or return to baseline ADL function  Description: INTERVENTIONS:  -  Assess patient's ability to carry out ADLs; assess patient's baseline for ADL function and identify physical deficits which impact ability to perform ADLs (bathing, care of mouth/teeth, toileting, grooming, dressing, etc )  - Assess/evaluate cause of self-care deficits   - Assess range of motion  - Assess patient's mobility; develop plan if impaired  - Assess patient's need for assistive devices and provide as appropriate  - Encourage maximum independence but intervene and supervise when necessary  - Involve family in performance of ADLs  - Assess for home care needs following discharge   - Consider OT consult to assist with ADL evaluation and planning for discharge  - Provide patient education as appropriate  Outcome: Progressing  Goal: Maintains/Returns to pre admission functional level  Description: INTERVENTIONS:  - Perform BMAT or MOVE assessment daily    - Set and communicate daily mobility goal to care team and patient/family/caregiver     - Collaborate with rehabilitation services on mobility goals if consulted    Problem: Knowledge Deficit  Goal: Patient/family/caregiver demonstrates understanding of disease process, treatment plan, medications, and discharge instructions  Description: Complete learning assessment and assess knowledge base  Interventions:  - Provide teaching at level of understanding  - Provide teaching via preferred learning methods  Outcome: Progressing     Problem: DISCHARGE PLANNING  Goal: Discharge to home or other facility with appropriate resources  Description: INTERVENTIONS:  - Identify barriers to discharge w/patient and caregiver  - Arrange for needed discharge resources and transportation as appropriate  - Identify discharge learning needs (meds, wound care, etc )  - Arrange for interpretive services to assist at discharge as needed  - Refer to Case Management Department for coordinating discharge planning if the patient needs post-hospital services based on physician/advanced practitioner order or complex needs related to functional status, cognitive ability, or social support system  Outcome: Progressing     Problem: Nutrition/Hydration-ADULT  Goal: Nutrient/Hydration intake appropriate for improving, restoring or maintaining nutritional needs  Description: Monitor and assess patient's nutrition/hydration status for malnutrition  Collaborate with interdisciplinary team and initiate plan and interventions as ordered  Monitor patient's weight and dietary intake as ordered or per policy  Utilize nutrition screening tool and intervene as necessary  Determine patient's food preferences and provide high-protein, high-caloric foods as appropriate       INTERVENTIONS:  - Monitor oral intake, urinary output, labs, and treatment plans  - Assess nutrition and hydration status and recommend course of action  - Evaluate amount of meals eaten  - Assist patient with eating if necessary   - Allow adequate time for meals  - Recommend/ encourage appropriate diets, oral nutritional supplements, and vitamin/mineral supplements  - Order, calculate, and assess calorie counts as needed  - Recommend, monitor, and adjust tube feedings and TPN/PPN based on assessed needs  - Assess need for intravenous fluids  - Provide specific nutrition/hydration education as appropriate  - Include patient/family/caregiver in decisions related to nutrition  Outcome: Progressing     - Out of bed for toileting  - Record patient progress and toleration of activity level   Outcome: Progressing   - Apply yellow socks and bracelet for high fall risk patients  - Consider moving patient to room near nurses station  Outcome: Progressing

## 2023-05-04 NOTE — CONSULTS
Orthopedics   Milka Verma 80 y o  female MRN: [id number]  Unit/Bed#: -01      Chief Complaint:   right hip pain    HPI:   80 y  o female sustained fall from ground-level yesterday evening while at home exiting her bed  She was found by family on the floor  She was taken to [facility] emergency department  In the emergency department it was found that she had posterior dislocated prosthetic hip  Prosthetic hip was reduced in ED  She had the hemiarthroplasty performed on April 9, 2023 with Dr Jayant Pascual at [facility] per son  Orthopedics was consulted for evaluation  Patient seen and evaluated bedside  She is doing well at this time  She has minimal hip pain currently  Her son is with her bedside  Her son states that her surgical staples were scheduled to be removed in the next several days  She is scheduled to follow-up with surgeon Dr Jayant Pascual at [facility] in 2 or 3 weeks  They are hoping to have staples removed while she is in the hospital   She has no other pain at this time  No head strike during fall  Denies fever, chills, nausea, vomiting, shortness of breath  Review of systems: ROS is negative other than that noted in the HPI  Constitutional: Negative for fatigue and fever  HENT: Negative for sore throat  Respiratory: Negative for shortness of breath  Cardiovascular: Negative for chest pain  Gastrointestinal: Negative for abdominal pain  Endocrine: Negative for cold intolerance and heat intolerance  Genitourinary: Negative for flank pain  Musculoskeletal: Negative for back pain  Skin: Negative for rash  Allergic/Immunologic: Negative for immunocompromised state  Neurological: Negative for dizziness  Psychiatric/Behavioral: Negative for agitation         Past Medical History:   Past Medical History:   Diagnosis Date   • COPD [facility]chronic obstructive pulmonary disease) ([facility] )    • Diabetes mellitus ([facility] )    • Disease of thyroid gland    • Hypertension Past Surgical History:   Right hip hemiarthroplasty on 2023 with Dr Macrina Guerrero at [facility]      Family History:  Family history reviewed and non-contributory  History reviewed  No pertinent family history  Social History:  Social History     Socioeconomic History   • Marital status:      Spouse name: None   • Number of children: None   • Years of education: None   • Highest education level: None   Occupational History   • None   Tobacco Use   • Smoking status: Former     Types: Cigarettes     Quit date: 2022     Years since quittin 3   • Smokeless tobacco: None   Vaping Use   • Vaping Use: Never used   Substance and Sexual Activity   • Alcohol use: Never   • Drug use: Never   • Sexual activity: Not Currently   Other Topics Concern   • None   Social History Narrative   • None     Social Determinants of Health     Financial Resource Strain: Not on file   Food Insecurity: No Food Insecurity   • Worried About Running Out of Food in the Last Year: Never true   • Ran Out of Food in the Last Year: Never true   Transportation Needs: No Transportation Needs   • Lack of Transportation (Medical): No   • Lack of Transportation (Non-Medical): No   Physical Activity: Not on file   Stress: Not on file   Social Connections: Not on file   Intimate Partner Violence: Not on file   Housing Stability: Low Risk    • Unable to Pay for Housing in the Last Year: No   • Number of Places Lived in the Last Year: 1   • Unstable Housing in the Last Year: No       Allergies:    Allergies   Allergen Reactions   • Erythromycin Other (See Comments)     unknown   • Iodine - Food Allergy Hives   • Lidocaine Hives   • Metformin Diarrhea   • Penicillin V Hives     TOLERATED CEFTRIAXONE 2022   • Rosuvastatin Myalgia   • Sulfamethoxazole-Trimethoprim Hives           Labs:  0   Lab Value Date/Time    HCT 38 6 2023 0425    HCT 36 1 2023 0932    HCT 32 8 (L) 2023 0210    HGB 12 3 2023 0425 "HGB 11 6 03/26/2023 0932    HGB 10 5 (L) 03/02/2023 0210    INR 0 93 02/27/2023 2317    WBC 12 06 (H) 05/04/2023 0425    WBC 13 17 (H) 03/26/2023 0932    WBC 14 16 (H) 03/02/2023 0210       Meds:    Current Facility-Administered Medications:   •  acetaminophen (TYLENOL) tablet 650 mg, 650 mg, Oral, Q6H PRN, Graceann Habermann, MD  •  aspirin (ECOTRIN LOW STRENGTH) EC tablet 81 mg, 81 mg, Oral, Daily, Graceann Habermann, MD, 81 mg at 05/04/23 1401  •  heparin (porcine) subcutaneous injection 5,000 Units, 5,000 Units, Subcutaneous, Q8H [facility], 5,000 Units at 05/04/23 1411 **AND** Platelet count, , , Once, Graceann Habermann, MD  •  insulin lispro (HumaLOG) 100 units/mL subcutaneous injection 1-6 Units, 1-6 Units, Subcutaneous, TID AC, 1 Units at 05/04/23 1708 **AND** Fingerstick Glucose (POCT), , , TID AC, Graceann Habermann, MD  •  insulin lispro (HumaLOG) 100 units/mL subcutaneous injection 1-6 Units, 1-6 Units, Subcutaneous, HS, Graceann Habermann, MD  •  levothyroxine tablet 100 mcg, 100 mcg, Oral, Daily, Graceann Habermann, MD, 100 mcg at 05/04/23 1401  •  ondansetron (ZOFRAN) injection 4 mg, 4 mg, Intravenous, Once, Radhames Pretty, DO  •  umeclidinium 62 5 mcg/actuation inhaler AEPB 1 puff, 1 puff, Inhalation, Daily, Graceann Habermann, MD, 1 puff at 05/04/23 1512    Blood Culture:   Lab Results   Component Value Date    BLOODCX No Growth After 5 Days  02/27/2023    BLOODCX No Growth After 5 Days  02/27/2023       Wound Culture:   No results found for: WOUNDCULT    Ins and Outs:  I/O last 24 hours: In: 628 [P O :118;  I V :10; IV Piggyback:500]  Out: 641 [Urine:641]          Physical Exam:   /65 (BP Location: Left arm)   Pulse 85   Temp 98 8 °F (37 1 °C)   Resp 16   Ht 5' 1\" (1 549 m)   Wt 57 4 kg (126 lb 8 7 oz)   SpO2 93%   BMI 23 91 kg/m²   Gen: No acute distress, resting comfortably in bed  HEENT: Eyes clear, moist mucus membranes, hearing intact  Respiratory: No audible wheezing or stridor  Cardiovascular: Well Perfused " peripherally, 2+ distal pulse  Abdomen: nondistended, no peritoneal signs  Musculoskeletal: right lower extremity  · Skin pink, dry, and intact, no erythema  · Surgical incision with staples in place  Surgical staples removed  Final distal staple required using hemostat to bend into proper shape to allow easy passage out of skin  4 x 4 and Tegaderm applied over distal portion of incision  Incision demonstrates routine healing  No separation of incision borders, erythema, discharge  · Sensation intact L3-S1  · 5/5 motor strength to ankle dorsi/plantar flexion  · 2+ DP/PT pulse  · Musculature is soft and compressible  · Leg lengths equal   · Compartments of thigh and calf are soft and compressible    Radiology:   I personally reviewed the films  X-rays AP/Lateral views of right hip shows effectively reduced right hemiarthroplasty compared to prior x-ray of posteriorly dislocated hemiarthroplasty upon presentation to ED  Assessment:  80 y  o female with right hip posterior dislocation properly reduced in ED in the setting of recent hemiarthroplasty on April 9, 2023 with [place]  Plan:   · WBAT right lower extremity with assistance  · PT/OT  · Pain control per primary  · Postreduction x-rays in ED demonstrate effective reduction of right hip  No further treatment needed  · Surgical staples removed today  Final distal staple required use of hemostat to bend into proper shape that easily slid out of the skin  Incision demonstrates routine healing  No separation of incision borders, erythema, discharge  Advised patient to keep surgical site clean and dry  · Discussed posterior hip precautions with patient and her son  Following these precautions will help prevent further dislocation  · Discussed the importance of fall prevention at home going forward  · Heavily encouraged patient to follow-up with surgeon who performed [facility]arthroplasty at [facility]    · Dispo: Ortho signing off  · Follow-up with Dr Tim Perez of [facility] as outpatient         Deanne Acosta PA-C

## 2023-05-05 LAB
ANION GAP SERPL CALCULATED.3IONS-SCNC: 4 MMOL/L (ref 4–13)
BASOPHILS # BLD AUTO: 0.04 THOUSANDS/ÂΜL (ref 0–0.1)
BASOPHILS NFR BLD AUTO: 0 % (ref 0–1)
BUN SERPL-MCNC: 30 MG/DL (ref 5–25)
CALCIUM SERPL-MCNC: 8.9 MG/DL (ref 8.4–10.2)
CHLORIDE SERPL-SCNC: 109 MMOL/L (ref 96–108)
CO2 SERPL-SCNC: 28 MMOL/L (ref 21–32)
CREAT SERPL-MCNC: 1.51 MG/DL (ref 0.6–1.3)
EOSINOPHIL # BLD AUTO: 0.15 THOUSAND/ÂΜL (ref 0–0.61)
EOSINOPHIL NFR BLD AUTO: 2 % (ref 0–6)
ERYTHROCYTE [DISTWIDTH] IN BLOOD BY AUTOMATED COUNT: 13.9 % (ref 11.6–15.1)
GFR SERPL CREATININE-BSD FRML MDRD: 30 ML/MIN/1.73SQ M
GLUCOSE P FAST SERPL-MCNC: 103 MG/DL (ref 65–99)
GLUCOSE SERPL-MCNC: 103 MG/DL (ref 65–140)
GLUCOSE SERPL-MCNC: 107 MG/DL (ref 65–140)
GLUCOSE SERPL-MCNC: 133 MG/DL (ref 65–140)
GLUCOSE SERPL-MCNC: 91 MG/DL (ref 65–140)
GLUCOSE SERPL-MCNC: 97 MG/DL (ref 65–140)
HCT VFR BLD AUTO: 30.4 % (ref 34.8–46.1)
HGB BLD-MCNC: 9.8 G/DL (ref 11.5–15.4)
IMM GRANULOCYTES # BLD AUTO: 0.07 THOUSAND/UL (ref 0–0.2)
IMM GRANULOCYTES NFR BLD AUTO: 1 % (ref 0–2)
LYMPHOCYTES # BLD AUTO: 2.44 THOUSANDS/ÂΜL (ref 0.6–4.47)
LYMPHOCYTES NFR BLD AUTO: 24 % (ref 14–44)
MCH RBC QN AUTO: 29.5 PG (ref 26.8–34.3)
MCHC RBC AUTO-ENTMCNC: 32.2 G/DL (ref 31.4–37.4)
MCV RBC AUTO: 92 FL (ref 82–98)
MONOCYTES # BLD AUTO: 0.92 THOUSAND/ÂΜL (ref 0.17–1.22)
MONOCYTES NFR BLD AUTO: 9 % (ref 4–12)
NEUTROPHILS # BLD AUTO: 6.61 THOUSANDS/ÂΜL (ref 1.85–7.62)
NEUTS SEG NFR BLD AUTO: 64 % (ref 43–75)
NRBC BLD AUTO-RTO: 0 /100 WBCS
PLATELET # BLD AUTO: 227 THOUSANDS/UL (ref 149–390)
PMV BLD AUTO: 9.7 FL (ref 8.9–12.7)
POTASSIUM SERPL-SCNC: 3.7 MMOL/L (ref 3.5–5.3)
RBC # BLD AUTO: 3.32 MILLION/UL (ref 3.81–5.12)
SODIUM SERPL-SCNC: 141 MMOL/L (ref 135–147)
WBC # BLD AUTO: 10.23 THOUSAND/UL (ref 4.31–10.16)

## 2023-05-05 RX ADMIN — ASPIRIN 81 MG: 81 TABLET, COATED ORAL at 08:33

## 2023-05-05 RX ADMIN — HEPARIN SODIUM 5000 UNITS: 5000 INJECTION INTRAVENOUS; SUBCUTANEOUS at 15:51

## 2023-05-05 RX ADMIN — UMECLIDINIUM 1 PUFF: 62.5 AEROSOL, POWDER ORAL at 08:33

## 2023-05-05 RX ADMIN — HEPARIN SODIUM 5000 UNITS: 5000 INJECTION INTRAVENOUS; SUBCUTANEOUS at 05:39

## 2023-05-05 RX ADMIN — LEVOTHYROXINE SODIUM 100 MCG: 100 TABLET ORAL at 05:39

## 2023-05-05 RX ADMIN — HEPARIN SODIUM 5000 UNITS: 5000 INJECTION INTRAVENOUS; SUBCUTANEOUS at 21:36

## 2023-05-05 NOTE — PLAN OF CARE
Problem: PHYSICAL THERAPY ADULT  Goal: Performs mobility at highest level of function for planned discharge setting  See evaluation for individualized goals  Description: Treatment/Interventions: Functional transfer training, LE strengthening/ROM, Therapeutic exercise, Endurance training, Cognitive reorientation, Patient/family training, Equipment eval/education, Bed mobility, Gait training  Equipment Recommended: Andry Mae       See flowsheet documentation for full assessment, interventions and recommendations  5/5/2023 1455 by Ami Bruner, PT  Note:    Problem List: Decreased strength, Decreased endurance, Impaired balance, Decreased mobility, Decreased cognition, Decreased safety awareness, Impaired hearing, Orthopedic restrictions  Assessment: Marci Grande is a 80 y o  Female who presents to [address] [place] [address] on 5/4/2023 from home s/p fall OOB and diagnosis of R hip dislocation, ambulatory dysfunction  Orders for PT eval and treat received, w/ activity orders of WBAT R LE w/ KI on, and posterior hip precautions  Pt presents w/ comorbidities of CKD Stage 3, DMII, R GREG 4/9/2023, COPD  At baseline, pt mobilizes S w/ RW, and reports >10  falls in the last 6 months  Upon evaluation, pt presents w/ the following deficits: weakness, impaired balance, impaired hearing, decreased endurance and pain limiting functional mobility  Upon eval, pt requires max A x 2 for bed mobility, mod A x 2 for transfers, and mod A x 2 for gait  PT evaluation recommendation is for Level II  During this admission, pt would benefit from continued skilled inpatient PT in the acute care setting in order to address the abovementioned deficits to maximize function and mobility before DC from acute care  See flowsheet documentation for full assessment

## 2023-05-05 NOTE — PLAN OF CARE
Problem: Potential for Falls  Goal: Patient will remain free of falls  Description: INTERVENTIONS:  - Educate patient/family on patient safety including physical limitations  - Instruct patient to call for assistance with activity   - Consult OT/PT to assist with strengthening/mobility   - Keep Call bell within reach  - Keep bed low and locked with side rails adjusted as appropriate  - Keep care items and personal belongings within reach  - Initiate and maintain comfort rounds  - Make Fall Risk Sign visible to staff  - Offer Toileting every 2 Hours, in advance of need  - Initiate/Maintain bedalarm  - Obtain necessary fall risk management equipment:   - Apply yellow socks and bracelet for high fall risk patients  - Consider moving patient to room near nurses station  Outcome: Progressing     Problem: PAIN - ADULT  Goal: Verbalizes/displays adequate comfort level or baseline comfort level  Description: Interventions:  - Encourage patient to monitor pain and request assistance  - Assess pain using appropriate pain scale  - Administer analgesics based on type and severity of pain and evaluate response  - Implement non-pharmacological measures as appropriate and evaluate response  - Consider cultural and social influences on pain and pain management  - Notify physician/advanced practitioner if interventions unsuccessful or patient reports new pain  Outcome: Progressing     Problem: INFECTION - ADULT  Goal: Absence or prevention of progression during hospitalization  Description: INTERVENTIONS:  - Assess and monitor for signs and symptoms of infection  - Monitor lab/diagnostic results  - Monitor all insertion sites, i e  indwelling lines, tubes, and drains  - Monitor endotracheal if appropriate and nasal secretions for changes in amount and color  - [place] appropriate cooling/warming therapies per order  - Administer medications as ordered  - Instruct and encourage patient and family to use good hand hygiene technique  - Identify and instruct in appropriate isolation precautions for identified infection/condition  Outcome: Progressing  Goal: Absence of fever/infection during neutropenic period  Description: INTERVENTIONS:  - Monitor WBC    Outcome: Progressing     Problem: SAFETY ADULT  Goal: Patient will remain free of falls  Description: INTERVENTIONS:  - Educate patient/family on patient safety including physical limitations  - Instruct patient to call for assistance with activity   - Consult OT/PT to assist with strengthening/mobility   - Keep Call bell within reach  - Keep bed low and locked with side rails adjusted as appropriate  - Keep care items and personal belongings within reach  - Initiate and maintain comfort rounds  - Make Fall Risk Sign visible to staff  - Offer Toileting every 2 Hours, in advance of need  - Initiate/Maintain bedalarm  - Obtain necessary fall risk management equipment:   - Apply yellow socks and bracelet for high fall risk patients  - Consider moving patient to room near nurses station  Outcome: Progressing  Goal: Maintain or return to baseline ADL function  Description: INTERVENTIONS:  -  Assess patient's ability to carry out ADLs; assess patient's baseline for ADL function and identify physical deficits which impact ability to perform ADLs (bathing, care of mouth/teeth, toileting, grooming, dressing, etc )  - Assess/evaluate cause of self-care deficits   - Assess range of motion  - Assess patient's mobility; develop plan if impaired  - Assess patient's need for assistive devices and provide as appropriate  - Encourage maximum independence but intervene and supervise when necessary  - Involve family in performance of ADLs  - Assess for home care needs following discharge   - Consider OT consult to assist with ADL evaluation and planning for discharge  - Provide patient education as appropriate  Outcome: Progressing  Goal: Maintains/Returns to pre admission functional level  Description: INTERVENTIONS:  - Perform BMAT or MOVE assessment daily    - Set and communicate daily mobility goal to care team and patient/family/caregiver  - Collaborate with rehabilitation services on mobility goals if consulted  - Perform Range of Motion 2 times a day  - Reposition patient every 2 hours  - Dangle patient 2 times a day  - Stand patient 2 times a day  - Ambulate patient 2 times a day  - Out of bed to chair 2 times a day   - Out of bed for meals 2 times a day  - Out of bed for toileting  - Record patient progress and toleration of activity level   Outcome: Progressing     Problem: DISCHARGE PLANNING  Goal: Discharge to home or other facility with appropriate resources  Description: INTERVENTIONS:  - Identify barriers to discharge w/patient and caregiver  - Arrange for needed discharge resources and transportation as appropriate  - Identify discharge learning needs (meds, wound care, etc )  - Arrange for interpretive services to assist at discharge as needed  - Refer to Case Management Department for coordinating discharge planning if the patient needs post-hospital services based on physician/advanced practitioner order or complex needs related to functional status, cognitive ability, or social support system  Outcome: Progressing     Problem: Knowledge Deficit  Goal: Patient/family/caregiver demonstrates understanding of disease process, treatment plan, medications, and discharge instructions  Description: Complete learning assessment and assess knowledge base  Interventions:  - Provide teaching at level of understanding  - Provide teaching via preferred learning methods  Outcome: Progressing     Problem: Nutrition/Hydration-ADULT  Goal: Nutrient/Hydration intake appropriate for improving, restoring or maintaining nutritional needs  Description: Monitor and assess patient's nutrition/hydration status for malnutrition  Collaborate with interdisciplinary team and initiate plan and interventions as ordered  Monitor patient's weight and dietary intake as ordered or per policy  Utilize nutrition screening tool and intervene as necessary  Determine patient's food preferences and provide high-protein, high-caloric foods as appropriate       INTERVENTIONS:  - Monitor oral intake, urinary output, labs, and treatment plans  - Assess nutrition and hydration status and recommend course of action  - Evaluate amount of meals eaten  - Assist patient with eating if necessary   - Allow adequate time for meals  - Recommend/ encourage appropriate diets, oral nutritional supplements, and vitamin/mineral supplements  - Order, calculate, and assess calorie counts as needed  - Recommend, monitor, and adjust tube feedings and TPN/PPN based on assessed needs  - Assess need for intravenous fluids  - Provide specific nutrition/hydration education as appropriate  - Include patient/family/caregiver in decisions related to nutrition  Outcome: Progressing     Problem: MOBILITY - ADULT  Goal: Maintain or return to baseline ADL function  Description: INTERVENTIONS:  -  Assess patient's ability to carry out ADLs; assess patient's baseline for ADL function and identify physical deficits which impact ability to perform ADLs (bathing, care of mouth/teeth, toileting, grooming, dressing, etc )  - Assess/evaluate cause of self-care deficits   - Assess range of motion  - Assess patient's mobility; develop plan if impaired  - Assess patient's need for assistive devices and provide as appropriate  - Encourage maximum independence but intervene and supervise when necessary  - Involve family in performance of ADLs  - Assess for home care needs following discharge   - Consider OT consult to assist with ADL evaluation and planning for discharge  - Provide patient education as appropriate  Outcome: Progressing  Goal: Maintains/Returns to pre admission functional level  Description: INTERVENTIONS:  - Perform BMAT or MOVE assessment daily    - Set and communicate daily mobility goal to care team and patient/family/caregiver  - Collaborate with rehabilitation services on mobility goals if consulted  - Perform Range of Motion 2 times a day  - Reposition patient every 2 hours    - Dangle patient 2 times a day  - Stand patient 2 times a day  - Ambulate patient 2 times a day  - Out of bed to chair 2 times a day   - Out of bed for meals 2 times a day  - Out of bed for toileting  - Record patient progress and toleration of activity level   Outcome: Progressing

## 2023-05-05 NOTE — ASSESSMENT & PLAN NOTE
Right hip surgery done on 4/9 at [facility]  Was due to remove staples at this time  Patient s/p fall at home and hip displacement  Was manually reduced in ER today  Pain is controlled   PT/OT consulted

## 2023-05-05 NOTE — OCCUPATIONAL THERAPY NOTE
"    Occupational Therapy Evaluation     Patient Name: Thalia Noel  Today's Date: 5/5/2023  Problem List  Principal Problem:    Ambulatory dysfunction  Active Problems:    CKD (chronic kidney disease) stage 3, GFR 30-59 ml/min ([facility])    Type 2 diabetes mellitus, without long-term current use of insulin ([facility])    Hypothyroidism    COPD [facility]chronic [facility] disease) [facility]    History of right hip replacement    Past Medical History  Past Medical History:   Diagnosis Date    COPD (chronic obstructive pulmonary disease) ([place] [address] )     Diabetes mellitus ([place] [address] )     Disease of thyroid gland     Hypertension      Past Surgical History  History reviewed  No pertinent surgical history  05/05/23 1250   OT Last Visit   OT Visit Date 05/05/23   Note Type   Note type Evaluation   Pain Assessment   Pain Assessment Tool 0-10   Pain Score No Pain   Restrictions/Precautions   Weight Bearing Precautions Per Order Yes   RLE Weight Bearing Per Order WBAT  (Posterior hip precautions)   Braces or Orthoses Knee immobilizer   Other Precautions WBS;THR;Chair Alarm; Bed Alarm;Cognitive; Fall Risk;Pain   Home Living   Type of [address] [place] [address] Two level;Stairs to enter with rails; Able to live on main level with bedroom/bathroom  (4STE)   Home Equipment Walker;Cane   Prior Function   Level of [place] Independent with functional mobility; Needs assistance with IADLS;Needs assistance with ADLs   Lives With (S)  Family  (Grandson - pt alone during day)   Receives Help From Family   IADLs Family/Friend/Other provides meals; Family/Friend/Other provides medication management; Family/Friend/Other provides transportation   Falls in the last 6 months (S)  >10   Vocational Retired   General   Additional Pertinent History Recent THR 4/9/23, DCed to rehab & only home recently   Family/Caregiver Present No   Subjective   Subjective \"I was watching diners, drive-ins & dives\" Pt received semi-supine in bed, significant lean to R   ADL " Eating Assistance 3  Moderate Assistance   Grooming Assistance 3  Moderate Assistance   UB Bathing Assistance 2  Maximal Assistance   LB Bathing Assistance 2  Maximal Assistance   UB Dressing Assistance 2  Maximal Assistance   LB Dressing Assistance 1  Total [address]  1  Total Assistance   Bed Mobility   Supine to Sit 1  Dependent   Additional items Assist x 2; Increased time required;LE management;Verbal cues;HOB elevated   Additional Comments Remains OOB to recliner   Transfers   Sit to Stand 3  Moderate assistance   Additional items Assist x 2;Trapeze bar;Verbal cues;Armrests   Stand to Sit 3  Moderate assistance   Additional items Assist x 2; Increased time required;Verbal cues;Armrests   Stand pivot 3  Moderate assistance   Additional items Assist x 2; Increased time required;Verbal cues  (RW)   Balance   Static Sitting Poor -   Dynamic Sitting Poor -   Static Standing Poor +   Dynamic Standing Poor +   Ambulatory Poor   Activity Tolerance   Activity Tolerance Patient tolerated treatment well   Medical Staff Made Aware PT GABY Mcintyre   Nurse Made Aware OSEAS Dexter   RUE Assessment   RUE Assessment WFL   LUE Assessment   LUE Assessment WFL   Cognition   Overall Cognitive Status Impaired   Arousal/Participation Alert   Attention Attends with cues to redirect   Orientation Level Oriented to person;Oriented to place; Disoriented to situation  (April 2023 - able to correct with use)   Memory Decreased recall of precautions;Decreased recall of recent events;Decreased short term memory   Following Commands Follows one step commands with increased time or repetition   Comments Amplifier - pt extremely [place]   Assessment   Limitation Decreased ADL status; Decreased UE strength;Decreased Safe judgement during ADL;Decreased cognition;Decreased endurance;Decreased self-care trans;Decreased high-level ADLs   Prognosis Good   Assessment Pt is a 80 y o  female seen for OT evaluation at [facility], admitted 5/4/2023 w/ Ambulatory dysfunction & fall  Pt's recent R THR dislocated, reduced in ED  Per ortho, WBAT with posterior hip precautions & knee immobilizer at all times  OT completed extensive review of pt's medical and social history  Comorbidities affecting pt's functional performance at time of assessment include: CKD, DM2, COPD, hx of recent R hip THR, generalized weakness, [place], hypothyroidism  Personal factors affecting pt at time of IE include: steps to enter environment, limited home support, behavioral pattern, difficulty performing ADLS, difficulty performing IADLS , limited insight into deficits, health management  and environment  Prior to admission, pt was living with her grandson in a [facility] with FFSU & 5 CHERYL  Pt was A w/  ADLS and w/ IADLS, (-) drove, & required use of RW PTA  Upon evaluation: Pt requires dependent x2 for bed mobility, Mod Ax2 for functional mobility/transfers, Max A for UB ADLs and Max A/Total A for LB ADLS 2* the following deficits impacting occupational performance: weakness, decreased strength, decreased balance, decreased tolerance, impaired memory, impaired sequencing, impaired problem solving, decreased safety awareness and orthopedic restrictions  Full objective findings from OT assessment regarding body systems outlined above  Pt to benefit from continued skilled OT tx while in the hospital to address deficits as defined above and maximize level of functional independence w/ ADL's and functional mobility  Occupational Performance areas to address include: eating, grooming, bathing/shower, toilet hygiene, dressing, functional mobility and clothing management  Based on findings, pt is of high complexity  The patient's raw score on the AM-PAC Daily Activity inpatient short form is 10, standardized score is 24 79, less than 39 4  Patients at this level are likely to benefit from DC to post-acute rehabilitation services   However, please refer to therapist "recommendation for discharge planning given other factors that may influence destination  At this time, OT recommendations at time of discharge are DC with level 2 resources  Goals   Patient Goals Pt wants to watch \"everybody love odette\"   Plan   Treatment Interventions ADL retraining;Functional transfer training;UE strengthening/ROM; Endurance training;Cognitive reorientation;Patient/family training;Equipment evaluation/education; Compensatory technique education;Continued evaluation   Goal Expiration Date 05/15/23   OT Treatment Day 0   OT Frequency 2-3x/wk   Recommendation   UB Rehab Discharge Recommendation (PT/OT) Level 2   AM-PAC Daily Activity Inpatient   Lower Body Dressing 1   Bathing 2   Toileting 1   Upper Body Dressing 2   Grooming 2   Eating 2   Daily Activity Raw Score 10   Turning Head Towards Sound 2   Follow Simple Instructions 2   Low Function Daily Activity Raw Score 14   Low Function Daily Activity Standardized Score  24 79   End of Consult   Education Provided Yes   Patient Position at End of Consult Bedside chair;Bed/Chair alarm activated; All needs within reach   Nurse Communication Nurse aware of consult     Pt will achieve the following goals within 10 days  *Pt will feed self 50% of meal with set-up in an upright, neutral position with S      *Pt will complete UB bathing and dressing with Min A & DME PRN  *Pt will complete LB bathing and dressing with Mod A & DME PRN  *Pt will complete toileting w/ Max A w/ G hygiene/thoroughness using DME PRN    *Pt will complete bed mobility with Max Ax1, with HOB elevated & use of side rails PRN to prep for purposeful tasks    *Pt will perform functional transfers with on/off all surfaces with Min Ax1 using DME as needed w/ G balance/safety  *Pt will improve functional mobility during ADL/IADL/leisure tasks to Min Ax1 using DME as needed w/ G balance/safety       Erika Winter OTR/L         "

## 2023-05-05 NOTE — PLAN OF CARE
Problem: Potential for Falls  Goal: Patient will remain free of falls  Description: INTERVENTIONS:  - Educate patient/family on patient safety including physical limitations  - Instruct patient to call for assistance with activity   - Consult OT/PT to assist with strengthening/mobility   - Keep Call bell within reach  - Keep bed low and locked with side rails adjusted as appropriate  - Keep care items and personal belongings within reach  - Initiate and maintain comfort rounds  - Make Fall Risk Sign visible to staff  - Offer Toileting every 3 Hours, in advance of need  - Initiate/Maintain alarm  - Obtain necessary fall risk management equipment:   - Apply yellow socks and bracelet for high fall risk patients  - Consider moving patient to room near nurses station  Outcome: Progressing     Problem: PAIN - ADULT  Goal: Verbalizes/displays adequate comfort level or baseline comfort level  Description: Interventions:  - Encourage patient to monitor pain and request assistance  - Assess pain using appropriate pain scale  - Administer analgesics based on type and severity of pain and evaluate response  - Implement non-pharmacological measures as appropriate and evaluate response  - Consider cultural and social influences on pain and pain management  - Notify physician/advanced practitioner if interventions unsuccessful or patient reports new pain  Outcome: Progressing     Problem: INFECTION - ADULT  Goal: Absence or prevention of progression during hospitalization  Description: INTERVENTIONS:  - Assess and monitor for signs and symptoms of infection  - Monitor lab/diagnostic results  - Monitor all insertion sites, i e  indwelling lines, tubes, and drains  - Monitor endotracheal if appropriate and nasal secretions for changes in amount and color  - [place] appropriate cooling/warming therapies per order  - Administer medications as ordered  - Instruct and encourage patient and family to use good hand hygiene technique  - Identify and instruct in appropriate isolation precautions for identified infection/condition  Outcome: Progressing  Goal: Absence of fever/infection during neutropenic period  Description: INTERVENTIONS:  - Monitor WBC    Outcome: Progressing     Problem: SAFETY ADULT  Goal: Patient will remain free of falls  Description: INTERVENTIONS:  - Educate patient/family on patient safety including physical limitations  - Instruct patient to call for assistance with activity   - Consult OT/PT to assist with strengthening/mobility   - Keep Call bell within reach  - Keep bed low and locked with side rails adjusted as appropriate  - Keep care items and personal belongings within reach  - Initiate and maintain comfort rounds  - Make Fall Risk Sign visible to staff  - Offer Toileting every 3 Hours, in advance of need  - Initiate/Maintain alarm  - Obtain necessary fall risk management equipment:   - Apply yellow socks and bracelet for high fall risk patients  - Consider moving patient to room near nurses station  Outcome: Progressing  Goal: Maintain or return to baseline ADL function  Description: INTERVENTIONS:  -  Assess patient's ability to carry out ADLs; assess patient's baseline for ADL function and identify physical deficits which impact ability to perform ADLs (bathing, care of mouth/teeth, toileting, grooming, dressing, etc )  - Assess/evaluate cause of self-care deficits   - Assess range of motion  - Assess patient's mobility; develop plan if impaired  - Assess patient's need for assistive devices and provide as appropriate  - Encourage maximum independence but intervene and supervise when necessary  - Involve family in performance of ADLs  - Assess for home care needs following discharge   - Consider OT consult to assist with ADL evaluation and planning for discharge  - Provide patient education as appropriate  Outcome: Progressing  Goal: Maintains/Returns to pre admission functional level  Description: INTERVENTIONS:  - Perform BMAT or MOVE assessment daily    - Set and communicate daily mobility goal to care team and patient/family/caregiver  - Collaborate with rehabilitation services on mobility goals if consulted  - Perform Range of Motion 3 times a day  - Reposition patient every 3 hours  - Dangle patient 3 times a day  - Stand patient 3 times a day  - Ambulate patient 3 times a day  - Out of bed to chair 3 times a day   - Out of bed for meals 3 times a day  - Out of bed for toileting  - Record patient progress and toleration of activity level   Outcome: Progressing     Problem: DISCHARGE PLANNING  Goal: Discharge to home or other facility with appropriate resources  Description: INTERVENTIONS:  - Identify barriers to discharge w/patient and caregiver  - Arrange for needed discharge resources and transportation as appropriate  - Identify discharge learning needs (meds, wound care, etc )  - Arrange for interpretive services to assist at discharge as needed  - Refer to Case Management Department for coordinating discharge planning if the patient needs post-hospital services based on physician/advanced practitioner order or complex needs related to functional status, cognitive ability, or social support system  Outcome: Progressing     Problem: Knowledge Deficit  Goal: Patient/family/caregiver demonstrates understanding of disease process, treatment plan, medications, and discharge instructions  Description: Complete learning assessment and assess knowledge base  Interventions:  - Provide teaching at level of understanding  - Provide teaching via preferred learning methods  Outcome: Progressing     Problem: Nutrition/Hydration-ADULT  Goal: Nutrient/Hydration intake appropriate for improving, restoring or maintaining nutritional needs  Description: Monitor and assess patient's nutrition/hydration status for malnutrition  Collaborate with interdisciplinary team and initiate plan and interventions as ordered  Monitor patient's weight and dietary intake as ordered or per policy  Utilize nutrition screening tool and intervene as necessary  Determine patient's food preferences and provide high-protein, high-caloric foods as appropriate       INTERVENTIONS:  - Monitor oral intake, urinary output, labs, and treatment plans  - Assess nutrition and hydration status and recommend course of action  - Evaluate amount of meals eaten  - Assist patient with eating if necessary   - Allow adequate time for meals  - Recommend/ encourage appropriate diets, oral nutritional supplements, and vitamin/mineral supplements  - Order, calculate, and assess calorie counts as needed  - Recommend, monitor, and adjust tube feedings and TPN/PPN based on assessed needs  - Assess need for intravenous fluids  - Provide specific nutrition/hydration education as appropriate  - Include patient/family/caregiver in decisions related to nutrition  Outcome: Progressing     Problem: MOBILITY - ADULT  Goal: Maintain or return to baseline ADL function  Description: INTERVENTIONS:  -  Assess patient's ability to carry out ADLs; assess patient's baseline for ADL function and identify physical deficits which impact ability to perform ADLs (bathing, care of mouth/teeth, toileting, grooming, dressing, etc )  - Assess/evaluate cause of self-care deficits   - Assess range of motion  - Assess patient's mobility; develop plan if impaired  - Assess patient's need for assistive devices and provide as appropriate  - Encourage maximum independence but intervene and supervise when necessary  - Involve family in performance of ADLs  - Assess for home care needs following discharge   - Consider OT consult to assist with ADL evaluation and planning for discharge  - Provide patient education as appropriate  Outcome: Progressing  Goal: Maintains/Returns to pre admission functional level  Description: INTERVENTIONS:  - Perform BMAT or MOVE assessment daily    - Set and communicate daily mobility goal to care team and patient/family/caregiver  - Collaborate with rehabilitation services on mobility goals if consulted  - Perform Range of Motion 3 times a day  - Reposition patient every 3 hours    - Dangle patient 3 times a day  - Stand patient 3 times a day  - Ambulate patient 3 times a day  - Out of bed to chair 3 times a day   - Out of bed for meals 3 times a day  - Out of bed for toileting  - Record patient progress and toleration of activity level   Outcome: Progressing     Problem: Prexisting or High Potential for Compromised Skin Integrity  Goal: Skin integrity is maintained or improved  Description: INTERVENTIONS:  - Identify patients at risk for skin breakdown  - Assess and monitor skin integrity  - Assess and monitor nutrition and hydration status  - Monitor labs   - Assess for incontinence   - Turn and reposition patient  - Assist with mobility/ambulation  - Relieve pressure over bony prominences  - Avoid friction and shearing  - Provide appropriate hygiene as needed including keeping skin clean and dry  - Evaluate need for skin moisturizer/barrier cream  - Collaborate with interdisciplinary team   - Patient/family teaching  - Consider wound care consult   Outcome: Progressing

## 2023-05-05 NOTE — CASE MANAGEMENT
Case Management Discharge Planning Note    Patient name Tamela Michel  Location /-85 MRN [id number]  : 1934 Date 2023       Current Admission Date: 2023  Current Admission Diagnosis:Ambulatory dysfunction   Patient Active Problem List    Diagnosis Date Noted   • History of right hip replacement 2023   • Ambulatory dysfunction 2023   • Generalized weakness 2023   • CKD (chronic kidney disease) stage 3, GFR 30-59 ml/min ([facility] ) 2023   • Type 2 diabetes mellitus, without long-term current use of insulin ([place] [address] ) 2023   • Hypothyroidism 2023   • COPD (chronic obstructive pulmonary disease) ([place] [address] ) 2023      LOS (days): 0  Geometric Mean LOS (GMLOS) (days):   Days to GMLOS:     OBJECTIVE:            Current admission status: Observation   Preferred Pharmacy:   [facility] [address] [facility][id number] [facility], [address] [place] [address]  Phone: [phone] Fax: [phone] #[id number] [place] [address] [address]  Phone: [phone] Fax: [phone]    Primary Care Provider: Christina Daniel MD    Primary Insurance: Tamia CALIX [facility] REP  Secondary Insurance:     DISCHARGE DETAILS:              Therapy is recommending short term rehab  Call placed to son, Sumeet Koch, [phone]  per pt request and left voicemail  Referrals made in [place]

## 2023-05-05 NOTE — CASE MANAGEMENT
Case Management Discharge Planning Note    Patient name Tiago Bower  Location /-86 MRN [id number]  : 1934 Date 2023       Current Admission Date: 2023  Current Admission Diagnosis:Ambulatory dysfunction   Patient Active Problem List    Diagnosis Date Noted   • History of right hip replacement 2023   • Ambulatory dysfunction 2023   • Generalized weakness 2023   • CKD (chronic kidney disease) stage 3, GFR 30-59 ml/min ([facility] ) 2023   • Type 2 diabetes mellitus, without long-term current use of insulin ([place] [address] ) 2023   • Hypothyroidism 2023   • COPD (chronic obstructive pulmonary disease) ([place] [address] ) 2023      LOS (days): 0  Geometric Mean LOS (GMLOS) (days):   Days to GMLOS:     OBJECTIVE:            Current admission status: Observation   Preferred Pharmacy:   [facility] [address] [id number] [address]  Phone: [phone] Fax: [phone] #[id number] [address] [address]  Phone: [phone] Fax: [phone]    Primary Care Provider: Cady Olea MD    Primary Insurance: Narcisa CALIX [facility] REP  Secondary Insurance:     DISCHARGE DETAILS:        Spoke to patient and she is agreeable to returning to [facility] for STR  Reserved in [address] [place], [place] [address]rick Esquivel

## 2023-05-05 NOTE — ASSESSMENT & PLAN NOTE
No results found for: HGBA1C    Recent Labs     05/04/23  1618 05/04/23  2123 05/05/23  0735 05/05/23  1124   POCGLU 155* 101 97 133       Blood Sugar Average: Last 72 hrs:  (P) 115 4     Relatively good control for patient's age  Will provide with diabetic diet and ISS

## 2023-05-05 NOTE — PLAN OF CARE
Problem: OCCUPATIONAL THERAPY ADULT  Goal: Performs self-care activities at highest level of function for planned discharge setting  See evaluation for individualized goals  Description: Treatment Interventions: ADL retraining, Functional transfer training, UE strengthening/ROM, Endurance training, Cognitive reorientation, Patient/family training, Equipment evaluation/education, Compensatory technique education, Continued evaluation          See flowsheet documentation for full assessment, interventions and recommendations  Note: Limitation: Decreased ADL status, Decreased UE strength, Decreased Safe judgement during ADL, Decreased cognition, Decreased endurance, Decreased self-care trans, Decreased high-level ADLs  Prognosis: Good  Assessment: Pt is a 80 y o  female seen for OT evaluation at [facility], admitted 5/4/2023 w/ Ambulatory dysfunction & fall  Pt's recent R THR dislocated, reduced in ED  Per ortho, WBAT with posterior hip precautions & knee immobilizer at all times  OT completed extensive review of pt's medical and social history  Comorbidities affecting pt's functional performance at time of assessment include: CKD, DM2, COPD, hx of recent R hip THR, generalized weakness, [place], hypothyroidism  Personal factors affecting pt at time of IE include: steps to enter environment, limited home support, behavioral pattern, difficulty performing ADLS, difficulty performing IADLS , limited insight into deficits, health management  and environment  Prior to admission, pt was living with her grandson in a [facility] with FF[employer] & 5 CHERYL  Pt was A w/  ADLS and w/ IADLS, (-) drove, & required use of RW PTA   Upon evaluation: Pt requires dependent x2 for bed mobility, Mod Ax2 for functional mobility/transfers, Max A for UB ADLs and Max A/Total A for LB ADLS 2* the following deficits impacting occupational performance: weakness, decreased strength, decreased balance, decreased tolerance, impaired memory, impaired sequencing, impaired problem solving, decreased safety awareness and orthopedic restrictions  Full objective findings from OT assessment regarding body systems outlined above  Pt to benefit from continued skilled OT tx while in the hospital to address deficits as defined above and maximize level of functional independence w/ ADL's and functional mobility  Occupational Performance areas to address include: eating, grooming, bathing/shower, toilet hygiene, dressing, functional mobility and clothing management  Based on findings, pt is of high complexity  The patient's raw score on the AM-PAC Daily Activity inpatient short form is 10, standardized score is 24 79, less than 39 4  Patients at this level are likely to benefit from DC to post-acute rehabilitation services  However, please refer to therapist recommendation for discharge planning given other factors that may influence destination  At this time, OT recommendations at time of discharge are DC with level 2 resources

## 2023-05-05 NOTE — PROGRESS NOTES
[facility]  [facility]  Name: Jennifer Ansari  MRN: [id number]  Unit/Bed#: -01 I Date of Admission: 5/4/2023   Date of Service: 5/5/2023 I Hospital Day: 0    Assessment/Plan   * Ambulatory dysfunction  Assessment & Plan  Frail patient with recent hip replacement   Just got home from the nursing facility and now is here due to fall and hip displacement  S/p reduction in ED by orthopedic surgery  awaiting PT/OT and placement  She would benefit from rehab or nursing facility care in the long run  History of right hip replacement  Assessment & Plan  Right hip surgery done on 4/9 at [facility]  Was due to remove staples at this time  Patient s/p fall at home and hip displacement  Was manually reduced in ER today  Pain is controlled  PT/OT consulted       COPD (chronic obstructive pulmonary disease) (HCC)  Assessment & Plan  Stable    Hypothyroidism  Assessment & Plan  Continue Levothyroxine    Type 2 diabetes mellitus, without long-term current use of insulin [facility])  Assessment & Plan  No results found for: HGBA1C    Recent Labs     05/04/23  1618 05/04/23  2123 05/05/23  0735 05/05/23  1124   POCGLU 155* 101 97 133       Blood Sugar Average: Last 72 hrs:  (P) 115 4     Relatively good control for patient's age  Will provide with diabetic diet and ISS  CKD (chronic kidney disease) stage 3, GFR 30-59 ml/min [facility])  Assessment & Plan  Lab Results   Component Value Date    EGFR 30 05/05/2023    EGFR 30 05/04/2023    EGFR 29 03/26/2023    CREATININE 1 51 (H) 05/05/2023    CREATININE 1 51 (H) 05/04/2023    CREATININE 1 57 (H) 03/26/2023   Baseline creatinine  Avoid nephrotoxic agents                VTE Pharmacologic Prophylaxis:   Pharmacologic: Heparin  Mechanical VTE Prophylaxis in Place: Yes    Patient Centered Rounds: I have performed bedside rounds with nursing staff today         Current Length of Stay: 0 day(s)    Current Patient Status: Observation Certification Statement: The patient will continue to require additional inpatient hospital stay due to pending PT/OT eval, and rehab placement    Discharge Plan: likely will need rehab, vs  Long term care    Code Status: Level 3 - DNAR and DNI      Subjective:   Patient sleeping wihtout any respiratory distress  Wakes up with name calling  Reports some pain in right hip  Also reports stomach not feeling well  On exam, non tender, non distended  Objective:     Vitals:   Temp (24hrs), Av 8 °F (36 6 °C), Min:96 8 °F (36 °C), Max:99 1 °F (37 3 °C)    Temp:  [96 8 °F (36 °C)-99 1 °F (37 3 °C)] 96 8 °F (36 °C)  HR:  [61-88] 73  Resp:  [16-17] 17  BP: (139-174)/(60-82) 140/82  SpO2:  [91 %-96 %] 95 %  Body mass index is 23 91 kg/m²  Input and Output Summary (last 24 hours): Intake/Output Summary (Last 24 hours) at 2023 1245  Last data filed at 2023 1009  Gross per 24 hour   Intake 423 ml   Output 510 ml   Net -87 ml       Physical Exam:     Physical Exam  Constitutional:       Appearance: She is well-developed  She is ill-appearing  HENT:      Head: Normocephalic and atraumatic  Pulmonary:      Effort: Pulmonary effort is normal  No respiratory distress  Breath sounds: Normal breath sounds  Abdominal:      General: There is no distension  Palpations: Abdomen is soft  Tenderness: There is no abdominal tenderness  There is no guarding  Musculoskeletal:      Cervical back: Normal range of motion  Skin:     General: Skin is warm and dry              Labs:    Results from last 7 days   Lab Units 23  0459   WBC Thousand/uL 10 23*   HEMOGLOBIN g/dL 9 8*   HEMATOCRIT % 30 4*   PLATELETS Thousands/uL 227   NEUTROS PCT % 64   LYMPHS PCT % 24   MONOS PCT % 9   EOS PCT % 2     Results from last 7 days   Lab Units 23  0459   SODIUM mmol/L 141   POTASSIUM mmol/L 3 7   CHLORIDE mmol/L 109*   CO2 mmol/L 28   BUN mg/dL 30*   CREATININE mg/dL 1 51*   ANION GAP mmol/L 4 CALCIUM mg/dL 8 9   GLUCOSE RANDOM mg/dL 103         Results from last 7 days   Lab Units 05/05/23  1124 05/05/23  0735 05/04/23  2123 05/04/23  1618 05/04/23  1359   POC GLUCOSE mg/dl 133 97 101 155* 91                    Recent Cultures (last 7 days):           Last 24 Hours Medication List:   Current Facility-Administered Medications   Medication Dose Route Frequency Provider Last Rate   • acetaminophen  650 mg Oral Q6H PRN Brad Groves PA-C     • aspirin  81 mg Oral Daily Brad Groves PA-C     • heparin (porcine)  5,000 Units Subcutaneous Q8H [address] Brad Groves PA-C     • insulin lispro  1-6 Units Subcutaneous TID AC Brad Groves PA-C     • insulin lispro  1-6 Units Subcutaneous HS Brad Groves PA-C     • levalbuterol  1 25 mg Nebulization Q8H PRN Brad Groves PA-C     • levothyroxine  100 mcg Oral Daily Brad Groves PA-C     • ondansetron  4 mg Intravenous Once Brad Groves PA-C     • umeclidinium  1 puff Inhalation Daily Brad Groves PA-C          Today, Patient Was Seen By: Agnieszka Hall MD    ** Please Note: Dictation voice to text software may have been used in the creation of this document   **

## 2023-05-05 NOTE — PROGRESS NOTES
-- Patient:  -- MRN: [id number]  -- Aidin Request ID: [id number]  -- Level of care reserved: Daryl Rutledge  -- Partner Reserved: Shawnee Porras and [facility]  , [address] [phone]  -- Clinical needs requested:  -- Geography searched: 10 miles around [phone]  -- Start of Service:  -- Request sent: 1:34pm EDT on 5/5/2023 by Rodney Petersen  -- Partner reserved: 3:06pm EDT on 5/5/2023 by Rodney Petersen  -- Choice list shared: 3:06pm EDT on 5/5/2023 by Rodney Petersen

## 2023-05-05 NOTE — CASE MANAGEMENT
Case Management Assessment & Discharge Planning Note    Patient name Stefania Rollins  Location /-37 MRN [id number]  : 1934 Date 2023       Current Admission Date: 2023  Current Admission Diagnosis:Ambulatory dysfunction   Patient Active Problem List    Diagnosis Date Noted   • History of right hip replacement 2023   • Ambulatory dysfunction 2023   • Generalized weakness 2023   • CKD (chronic kidney disease) stage 3, GFR 30-59 ml/min ([facility] ) 2023   • Type 2 diabetes mellitus, without long-term current use of insulin ([facility] ) 2023   • Hypothyroidism 2023   • COPD (chronic obstructive pulmonary disease) ([facility] ) 2023      LOS (days): 0  Geometric Mean LOS (GMLOS) (days):   Days to GMLOS:     OBJECTIVE:              Current admission status: Observation       Preferred Pharmacy:   [facility] [address] [id number] [address]  Phone: [phone] Fax: [phone] #[id number] [facility], [address] - [address]  [address] [address]  Phone: [phone] Fax: [phone]    Primary Care Provider: Domenica Paiz MD    Primary Insurance: [facility]  Secondary Insurance:     ASSESSMENT:  Nadiya Shahid Proxies    There are no active Health Care Proxies on file         Advance Directives  Does patient have a Health Care POA?: Yes  Does patient have Advance Directives?: Yes  Advance Directives: Living will, Power of  for health care  Primary Contact: SonLobo         Readmission Root Cause  30 Day Readmission: No    Patient Information  Admitted from[de-identified] Home  Mental Status: Alert  During Assessment patient was accompanied by: Not accompanied during assessment  Assessment information provided by[de-identified] Patient  Primary Caregiver: Self  Support Systems: Son, Family members  [place] of Residence:  [facility] do you live in?: [employer] entry access options   Select all that apply : Stairs  Number of steps to enter home : 4  Do the steps have railings?: Yes  Type of Current Residence: 2 story home  Upon entering residence, is there a bedroom on the main floor (no further steps)?: Yes  Upon entering residence, is there a bathroom on the main floor (no further steps)?: Yes  In the last 12 months, was there a time when you were not able to pay the mortgage or rent on time?: No  In the last 12 months, how many places have you lived?: 1  In the last 12 months, was there a time when you did not have a steady place to sleep or slept in a shelter (including now)?: No  Homeless/housing insecurity resource given?: N/A  Living Arrangements: Lives w/ Son    Activities of Daily Living Prior to Admission  Functional Status: Independent  Completes ADLs independently?: Yes  Ambulates independently?: Yes  Does patient use assisted devices?: Yes  Assisted Devices (DME) used: [employer]  Does patient currently own DME?: Yes  What DME does the patient currently own?: Shan Zia Christopher  Does patient have a history of Outpatient Therapy (PT/OT)?: Yes  Does the patient have a history of Short-Term Rehab?: Yes ([address] [place] [address])  Does patient have a history of HHC?: Yes [facility])  Does patient currently have [facility]?: Yes    Current Home Health Care  Type of Current Home Care Services: Home PT, Home OT, Nurse visit  [address] [place] [address][de-identified] [address] [place] [address] Provider[de-identified] PCP    Patient Information Continued  Income Source: SSI/SSD  Does patient have prescription coverage?: No  Within the past 12 months, you worried that your food would run out before you got the money to buy more : Never true  Within the past 12 months, the food you bought just didn't last and you didn't have money to get more : Never true  Food insecurity resource given?: N/A  Does patient receive dialysis treatments?: No  Does patient have a history of substance abuse?: No  Does patient have a history of Mental Health Diagnosis?: No         Means of Transportation  Means of Transport to Appts[de-identified] Family transport  In the past 12 months, has lack of transportation kept you from medical appointments or from getting medications?: No  In the past 12 months, has lack of transportation kept you from meetings, work, or from getting things needed for daily living?: No  Was application for public transport provided?: N/A        DISCHARGE DETAILS:    Discharge planning discussed with[de-identified] Patient  Freedom of Choice: Yes     CM contacted family/caregiver?: Yes  Were Treatment Team discharge recommendations reviewed with patient/caregiver?: Yes  Did patient/caregiver verbalize understanding of patient care needs?: Yes  Were patient/caregiver advised of the risks associated with not following Treatment Team discharge recommendations?: Yes    Contacts  Patient Contacts: Lois Gibbs - son  Relationship to Patient[de-identified] Family  Contact Method: Phone  Phone Number: [phone]  Reason/Outcome: Continuity of Care, Emergency Contact, Discharge [address]         Is the patient interested in [facility] at discharge?: No    DME Referral Provided  Referral made for DME?: No    Other Referral/Resources/Interventions Provided:  Interventions: Short Term Rehab         Treatment Team Recommendation: Short Term Rehab  Discharge Destination Plan[de-identified] Short Term Rehab                                         Additional Comments: Met with patient to discuss role of CM and any needs prior to discharge  Pt living with son temporarily in [address] [facility] w/ 4 STE and 1st flr setup  Indp PTA  Owns cane, walker  Pt uses [employer]  [facility] str at [facility] and [facility]  Pt has hx HH - [facility] and currently receiving PT/OT/SN  Hx OP PT  No hx DA/MH  Son will transport at discharge pending PT/OT recs

## 2023-05-05 NOTE — PHYSICAL THERAPY NOTE
PHYSICAL THERAPY EVALUATION NOTE    Patient Name: Kalee Esteves  LHRPK'[id number] Date: 2023    AGE:   80 y o  Mrn:   [id number]  ADMIT DX:  Acute respiratory insufficiency [R06 89]  Hip injury [S79 919A]  Nausea and vomiting [R11 2]  Fall, initial encounter [W19  XXXA]  Closed posterior dislocation of hip, initial encounter ([place] [address] ) [N07 321N]    Past Medical History:   Diagnosis Date    COPD (chronic obstructive pulmonary disease) ([place] [address] )     Diabetes mellitus ([place] [address] )     Disease of thyroid gland     Hypertension      Length Of Stay: 0  PHYSICAL THERAPY EVALUATION :   Patient's identity confirmed via 2 patient identifiers (full name and ) at start of session       23 4778   Note Type   Note type Evaluation   Pain Assessment   Pain Assessment Tool 0-10   Pain Score No Pain   Restrictions/Precautions   Weight Bearing Precautions Per Order Yes   RLE Weight Bearing Per Order WBAT   Braces or Orthoses Knee immobilizer  (placed in , noted per Dr Clarisa Everett in attestation of GENARO Valdez's note)   Other Precautions Cognitive; Chair Alarm; Bed Alarm;WBS;THR;Multiple lines; Fall Risk;Pain  (posterior THP)   Home Living   Type of [address] [place] [address] Two level;Stairs to enter with rails; Able to live on main level with bedroom/bathroom  ([address])   Home Equipment Walker;Cane   Additional Comments Pt reports using RW PTA, Pt reports just getting home from rehab a few weeks ago   Prior Function   Level of [place] Independent with functional mobility; Needs assistance with ADLs   Lives With Family  (Jeanette Coyne (works during the day so pt is often home alone))   Receives Help From Family   IADLs Family/Friend/Other provides transportation; Family/Friend/Other provides meals; Family/Friend/Other provides medication management   Falls in the last 6 months (S)  >10   Vocational Retired   General   Additional Pertinent History Pt w/ recent R GREG @ [address]  [facility] on , pt DC'd to rehab and since has been home  Pt w/ reduction of hip in the ED   Family/Caregiver Present No   Cognition   Arousal/Participation Alert   Orientation Level Oriented to person;Oriented to place;Oriented to situation  (generally oriented to time (said April))   Memory Decreased recall of recent events   Following Commands Follows one step commands with increased time or repetition   Comments Pt w/ SuperEar due to being extremely hard of hearing, recalls 0/3 hip precautions   Strength RLE   RLE Overall Strength 3-/5   Strength LLE   LLE Overall Strength 3/5   Bed Mobility   Supine to Sit 1  Dependent   Additional items Assist x 2; Increased time required;Verbal cues;LE management  (to pt's R)   Additional Comments Pt requires max A x 1 to sit EOB w/ significant retropulsive lean   Transfers   Sit to Stand 3  Moderate assistance   Additional items Assist x 2; Increased time required;Verbal cues   Stand to Sit 3  Moderate assistance   Additional items Assist x 2; Increased time required;Verbal cues   Ambulation/Elevation   Gait pattern Decreased foot clearance; Short stride   Gait Assistance 3  Moderate assist   Additional items Assist x 2   Assistive Device Rolling walker   Distance 2 ft  (EOB to recliner chair)   Balance   Static Sitting Poor -   Static Standing Poor -   Ambulatory Poor -   Activity Tolerance   Activity Tolerance Patient limited by fatigue   Medical Staff Made Aware GABY Mejia   Nurse Made Aware Spoke to RN [facility]   Assessment   Problem List Decreased strength;Decreased endurance; Impaired balance;Decreased mobility; Decreased cognition;Decreased safety awareness; Impaired hearing;Orthopedic restrictions   Assessment Liu Guzman is a 80 y o  Female who presents to [address] [place], [place] [address] on 5/4/2023 from home s/p fall OOB and diagnosis of R hip dislocation, ambulatory dysfunction  Orders for PT eval and treat received, w/ activity orders of WBAT R LE w/ KI on, and posterior hip precautions   Pt presents w/ comorbidities of CKD Stage 3, DMII, R GREG 4/9/2023, COPD  At baseline, pt mobilizes S w/ RW, and reports >10  falls in the last 6 months  Upon evaluation, pt presents w/ the following deficits: weakness, impaired balance, impaired hearing, decreased endurance and pain limiting functional mobility  Upon eval, pt requires max A x 2 for bed mobility, mod A x 2 for transfers, and mod A x 2 for gait  PT evaluation recommendation is for Level II  During this admission, pt would benefit from continued skilled inpatient PT in the acute care setting in order to address the abovementioned deficits to maximize function and mobility before DC from acute care  Goals   Patient Goals to watch Everybody Shelby eLmon on [place]   STG Expiration Date 05/15/23   Short Term Goal #1 Patient will: Perform all bed mobility tasks w/ modx1 to improve pt's independence w/ repositioning for decrease risk of skin breakdown, Perform all transfers w/ modx1 consistently from various height surfaces in order to improve I w/ engagement w/ real-world environments/situations, Ambulate at least 20 ft  with roller walker w/ modx1 w/o LOB to facilitate return and engagement w/ previous living environment, Increase all balance 1/2 grade to decrease risk for falls, Tolerate 3 hr OOB to faciliate upright tolerance and Tolerate seated at EOB 15 minutes w/ supervision in order to work on trunk strength/endurance for functional tasks   Plan   Treatment/Interventions Functional transfer training;LE strengthening/ROM; Therapeutic exercise; Endurance training;Cognitive reorientation;Patient/family training;Equipment eval/education; Bed mobility;Gait training   PT Frequency 3-5x/wk   Recommendation   UB Rehab Discharge Recommendation (PT/OT) Level 2   Equipment Recommended Walker   AM-PAC Basic Mobility Inpatient   Turning in Flat Bed Without Bedrails 1   Lying on Back to Sitting on Edge of Flat Bed Without Bedrails 1   Moving Bed to Chair 1   Standing Up From Chair Using Arms 1   Walk in Room 1   Climb 3-5 Stairs With Railing 1   Basic Mobility Inpatient Raw Score 6   Turning Head Towards Sound 3   Follow Simple Instructions 3   Low Function Basic Mobility Raw Score  12   Low Function Basic Mobility Standardized Score  18 33   Highest Level Of Mobility   -HLM Goal 2: Bed activities/Dependent transfer   -HLM Achieved 4: Move to chair/commode   End of Consult   Patient Position at End of Consult Bedside chair;Bed/Chair alarm activated; All needs within reach  (KI on RLE, posted posterior hip precautions on whiteboard)         The patient's AM-PAC Basic Mobility Inpatient Short Form Raw Score is 6, Standardized Score is    A standardized score less than 38 32 (raw score of 16) suggests the patient may benefit from discharge to post-acute rehabilitation services which may not coincide with above PT recommendations  However please refer to therapist recommendation for discharge planning given other factors that may influence destination      Pt would benefit from skilled inpatient PT during this admission in order to facilitate progress towards goals to maximize functional independence    Todd Kwon, PT, DPT

## 2023-05-05 NOTE — UTILIZATION REVIEW
Initial Clinical Review    Admission: Date/Time/Statement: 5/4/23 0803 observation AND CHANGED 5/5/23 1550 INPATIENT RE: PATIENT NEEDS > 2 MIDNIGHT STAY DUE TO AMBULATORY DYSFUNCTION WITH RECENT HIP ARTHROPLASTY, NOW WITH DISLOCATION AND REDUCTION, REQUIRES REHAB  Admission Orders (From admission, onward)     Ordered        05/05/23 1550  Inpatient Admission  Once                      Orders Placed This Encounter   Procedures   • Inpatient Admission     Standing Status:   Standing     Number of Occurrences:   1     Order Specific Question:   Level of Care     Answer:   Med Surg [16]     Order Specific Question:   Estimated length of stay     Answer:   More than 2 Midnights     Order Specific Question:   Certification     Answer:   I certify that inpatient services are medically necessary for this patient for a duration of greater than two midnights  See H&P and MD Progress Notes for additional information about the patient's course of treatment  ED Arrival Information     Expected   -    Arrival   5/4/2023 04:14    Acuity   Emergent            Means of arrival   Ambulance    Escorted by   Zacarias Thomas)    Service   Hospitalist    Admission type   Emergency            Arrival complaint   hip injury           Chief Complaint   Patient presents with   • Fall     Presents via EMS from home s/p fall from bed  [place]  Confused at baseline  On ASA  Fentanyl and zofran given pta       Initial Presentation: 80 y o  female from home to ED via ems admitted to observation [address] due to ambulatory dysfunction in setting of right hip replacement 4/9/23  Presented due to right hip pain starting am of arrival when rolled out of bed  To floor  On asa  Per ems given Fentanyl   On exam:  Tenderness to palpation over right hip, well-healing incision noted over right hip with staple line intact  extremity is somewhat shortened and internally rotated still he neurovascularly intact    Bun 34, creatinine 1 51 is baseline     Wbc 12 06  Ct pelvis showed dislocation of right femur  Enlargement of right gluteal musculature  In the ED right hip reduced under sedation  Plan is pain control, PT/OT and consult Orthopedics  Start Diabetic diet and SSI with accuchecks  5/4/23 per Orthopedics:  Patient with recent right hemiarthroplasty and in ED right hip posterior dislocation reduced in ED  Plan is WBAT RLE, PT/OT, pain control  Surgical staples removed  Posterior hip precautions  5/5/23 CHANGED TO INPATIENT:   Has some pain in right hip  Feels stomach not well  On exam appears ill  Wbc 10 23  Bun 30, creatinine 1 51  Seen by PT and has decreased strength, endurance and mobility  Continue pain control  Referral to short term rehab  Date: 5/6/23   Day 2:  Pain right hip  On exam  Diminished breath sounds  Redness to sacrum, right hip and thigh  RLE limited ROM  Urine cloudy  Recommend  knee immobilizer while in bed to prevent hyperflexion and risk of recurrent dislocation  Pain control        ED Triage Vitals   Temperature Pulse Respirations Blood Pressure SpO2   05/04/23 0420 05/04/23 0420 05/04/23 0420 05/04/23 0420 05/04/23 0420   97 5 °F (36 4 °C) 72 18 (!) 197/95 94 %      Temp Source Heart Rate Source Patient Position - Orthostatic VS BP Location FiO2 (%)   05/04/23 0420 05/04/23 0420 05/04/23 0420 05/04/23 0420 --   Oral Monitor Lying Right arm       Pain Score       05/04/23 0700       6          Wt Readings from Last 1 Encounters:   05/04/23 57 4 kg (126 lb 8 7 oz)     Additional Vital Signs:   05/05/23 21:28:21 97 5 °F (36 4 °C) 66 16 147/64 92 94 % -- -- -- -- --   05/05/23 15:53:46 -- 66 -- 169/67 101 97 % -- -- -- -- --   05/05/23 15:48:16 96 8 °F (36 °C) Abnormal  66 22 182/78 Abnormal  113 96 % -- -- -- -- --   05/05/23 12:38:10 -- 73 -- 140/82 101 95 % -- -- -- -- --   05/05/23 07:38:06 96 8 °F (36 °C) Abnormal  61 17 174/64 Abnormal  101 96 % --         05/05/23 07:38:06 96 8 °F (36 °C) Abnormal  61 17 174/64 Abnormal  101 96 % -- -- -- -- --   05/04/23 21:23:34 97 2 °F (36 2 °C) Abnormal  88 -- 155/60 92 91 % -- -- -- None (Room air) Lying   05/04/23 19:46:16 97 3 °F (36 3 °C) Abnormal  74 -- 146/82 103 93 % -- -- -- None (Room air) Lying   05/04/23 1539 98 8 °F (37 1 °C) -- -- -- -- -- -- -- -- -- --   05/04/23 1513 99 1 °F (37 3 °C) 85 16 139/65 94 93 % -- -- -- None (Room air) Lying   05/04/23 0918 98 2 °F (36 8 °C) 55 18 140/62 80 95 % -- -- -- None (Room air) Lying   05/04/23 0800 -- 69 19 153/70 100 98 % -- -- -- None (Room air) Lying   05/04/23 07:32:59 -- 75 20 213/86 Abnormal  -- 94 % 28 -- 2 L/min Nasal cannula Lying   05/04/23 0700 -- 74 20 148/62 -- 94 % -- -- -- Nasal cannula Lying   05/04/23 0600 96 9 °F (36 1 °C) Abnormal  58 15 147/68 -- 99 % -- -- -- Nasal cannula --   05/04/23 0500 -- 66 17 166/72 -- 90 % -- -- -- None (Room air) --     Pertinent Labs/Diagnostic Test Results:   XR pelvis ap only 1 or 2 vw   Final Result by Aman Oropeza MD (05/04 1230)      Reduced right hip prosthetic dislocation      Workstation performed: [id number]         XR pelvis ap only 1 or 2 vw   Final Result by Luis Lee MD (05/04 1305)      Superiorly dislocated right hip prosthesis  No evidence for fracture  Workstation performed: [id number]         TRAUMA - CT spine cervical wo contrast   Final Result by Princess Huy MD (05/04 7060)      No cervical spine fracture or traumatic malalignment  Workstation performed: [id number]         CT pelvis wo contrast   Final Result by Princess Huy MD (05/04 4088)      Posterior and superior dislocation of the proximal right femur, including the patient's femoral and acetabular total hip arthroplasty hardware  No acute fracture  There is mild enlargement and edema of the right gluteal musculature when compared to the left likely post traumatic in nature        No free air or free fluid within the pelvis  Workstation performed: [id number]         TRAUMA - CT head wo contrast   Final Result by Antonette Amador MD (05/04 0500)      No acute intracranial abnormality  Workstation performed: [id number]         XR Trauma chest portable   ED Interpretation by Amirah Quintero DO (05/04 0706)   This study was ordered and independently reviewed by me    No acute findings noted           Final Result by Antonette Amador MD (05/04 7341)      No acute cardiopulmonary disease                    Workstation performed: [id number]             Results from last 7 days   Lab Units 05/06/23  0900 05/05/23  0459 05/04/23  0425   WBC Thousand/uL 9 84 10 23* 12 06*   HEMOGLOBIN g/dL 10 9* 9 8* 12 3   HEMATOCRIT % 33 5* 30 4* 38 6   PLATELETS Thousands/uL 219 227 285   NEUTROS ABS Thousands/µL 6 47 6 61 7 28     Results from last 7 days   Lab Units 05/05/23  0459 05/04/23  0425   SODIUM mmol/L 141 140   POTASSIUM mmol/L 3 7 3 7   CHLORIDE mmol/L 109* 106   CO2 mmol/L 28 26   ANION GAP mmol/L 4 8   BUN mg/dL 30* 34*   CREATININE mg/dL 1 51* 1 51*   EGFR ml/min/1 73sq m 30 30   CALCIUM mg/dL 8 9 10 0     Results from last 7 days   Lab Units 05/06/23  1109 05/06/23  0748 05/05/23  2102 05/05/23  1601 05/05/23  1124 05/05/23  0735 05/04/23  2123 05/04/23  1618 05/04/23  1359   POC GLUCOSE mg/dl 107 117 107 91 133 97 101 155* 91     Results from last 7 days   Lab Units 05/05/23  0459 05/04/23  0425   GLUCOSE RANDOM mg/dL 103 136     Results from last 7 days   Lab Units 05/04/23  1036 05/04/23  0612 05/04/23  0425   HS TNI 0HR ng/L  --   --  12   HS TNI 2HR ng/L  --  10  --    HSTNI D2 ng/L  --  -2  --    HS TNI 4HR ng/L 10  --   --    HSTNI D4 ng/L -2  --   --      Results from last 7 days   Lab Units 05/04/23  1253   CLARITY UA  Clear   COLOR UA  Yellow   SPEC GRAV UA  1 015   PH UA  6 0   GLUCOSE UA mg/dl Negative   KETONES UA mg/dl Negative   BLOOD UA  Small*   PROTEIN UA mg/dl Trace*   NITRITE UA  Negative BILIRUBIN UA  Negative   UROBILINOGEN UA (BE) mg/dl <2 0   LEUKOCYTES UA  Large*   WBC UA /hpf 4-10*   RBC UA /hpf 0-1   BACTERIA UA /hpf Occasional   EPITHELIAL CELLS WET PREP /hpf Occasional       ED Treatment:   Medication Administration from 05/04/2023 0414 to 05/04/2023 3916       Date/Time Order Dose Route Action Comments     05/04/2023 0420 EDT fentanyl citrate (PF) (FOR EMS ONLY) 100 mcg/2 mL injection 100 mcg 0 mcg Does not apply Given to EMS --     05/04/2023 0420 EDT ondansetron (FOR EMS ONLY) (ZOFRAN) 4 mg/2 mL injection 4 mg 0 mg Does not apply Given to EMS --     05/04/2023 0548 EDT ondansetron (ZOFRAN) injection 4 mg 4 mg Intravenous Given --     05/04/2023 0730 EDT fentanyl citrate (PF) 100 MCG/2ML 50 mcg 25 mcg Intravenous Given --     05/04/2023 0737 EDT propofol (DIPRIVAN) 200 MG/20ML bolus injection 50 mg 50 mg Intravenous Given --     05/04/2023 0736 EDT propofol (DIPRIVAN) 200 MG/20ML bolus injection 50 mg 25 mg Intravenous Given --     05/04/2023 0730 EDT sodium chloride 0 9 % bolus 500 mL 500 mL Intravenous New Bag --        Past Medical History:   Diagnosis Date   • COPD (chronic obstructive pulmonary disease) ([place] [address] )    • Diabetes mellitus ([place] [address] )    • Disease of thyroid gland    • Hypertension      Present on Admission:  • CKD (chronic kidney disease) stage 3, GFR 30-59 ml/min ([facility])  • Type 2 diabetes mellitus, without long-term current use of insulin ([facility])  • Hypothyroidism  • COPD (chronic obstructive pulmonary disease) ([place] [address] )      Admitting Diagnosis: Acute respiratory insufficiency [R06 89]  Hip injury [S79 919A]  Nausea and vomiting [R11 2]  Fall, initial encounter [W19  XXXA]  Closed posterior dislocation of hip, initial encounter ([place] [address] ) [V46 595M]  Age/Sex: 80 y o  female  Admission Orders:  Scheduled Medications:  aspirin, 81 mg, Oral, Daily  heparin (porcine), 5,000 Units, Subcutaneous, Q8H [address]  insulin lispro, 1-6 Units, Subcutaneous, TID AC  insulin lispro, 1-6 Units, Subcutaneous, HS  levothyroxine, 100 mcg, Oral, Daily  ondansetron, 4 mg, Intravenous, Once  umeclidinium, 1 puff, Inhalation, Daily    Continuous IV Infusions: none      PRN Meds:  acetaminophen, 650 mg, Oral, Q6H PRN - x 1 5/4  levalbuterol, 1 25 mg, Nebulization, Q8H PRN x 1 5/4    PT/OT    IP CONSULT TO ORTHOPEDIC SURGERY    Network Utilization Review Department  ATTENTION: Please call with any questions or concerns to [phone] and carefully listen to the prompts so that you are directed to the right person  All voicemails are confidential   [facility] all requests for admission clinical reviews, approved or denied determinations and any other requests to dedicated fax number below belonging to the campus where the patient is receiving treatment   List of dedicated fax numbers for the Facilities:  [address] DENIALS (Administrative/Medical Necessity) [phone]   [address] (Maternity/NICU/Pediatrics) [phone]   8 Annabelle Patel [phone]   [facility] [phone]   [address] [place] [address] [facility] 28 [phone] [address] [place] [facility] 134 815 [facility] [phone]

## 2023-05-05 NOTE — ASSESSMENT & PLAN NOTE
Frail patient with recent hip replacement   Just got home from the nursing facility and now is here due to fall and hip displacement  S/p reduction in ED by orthopedic surgery  awaiting PT/OT and placement  She would benefit from rehab or nursing facility care in the long run

## 2023-05-05 NOTE — ASSESSMENT & PLAN NOTE
Lab Results   Component Value Date    EGFR 30 05/05/2023    EGFR 30 05/04/2023    EGFR 29 03/26/2023    CREATININE 1 51 (H) 05/05/2023    CREATININE 1 51 (H) 05/04/2023    CREATININE 1 57 (H) 03/26/2023   Baseline creatinine  Avoid nephrotoxic agents

## 2023-05-06 VITALS
SYSTOLIC BLOOD PRESSURE: 155 MMHG | HEART RATE: 71 BPM | WEIGHT: 126.54 LBS | DIASTOLIC BLOOD PRESSURE: 63 MMHG | BODY MASS INDEX: 23.89 KG/M2 | OXYGEN SATURATION: 97 % | TEMPERATURE: 97 F | RESPIRATION RATE: 16 BRPM | HEIGHT: 61 IN

## 2023-05-06 LAB
ATRIAL RATE: 63 BPM
BASOPHILS # BLD AUTO: 0.06 THOUSANDS/ÂΜL (ref 0–0.1)
BASOPHILS NFR BLD AUTO: 1 % (ref 0–1)
EOSINOPHIL # BLD AUTO: 0.23 THOUSAND/ÂΜL (ref 0–0.61)
EOSINOPHIL NFR BLD AUTO: 2 % (ref 0–6)
ERYTHROCYTE [DISTWIDTH] IN BLOOD BY AUTOMATED COUNT: 13.8 % (ref 11.6–15.1)
FLUAV RNA RESP QL NAA+PROBE: NEGATIVE
FLUBV RNA RESP QL NAA+PROBE: NEGATIVE
GLUCOSE SERPL-MCNC: 107 MG/DL (ref 65–140)
GLUCOSE SERPL-MCNC: 117 MG/DL (ref 65–140)
GLUCOSE SERPL-MCNC: 145 MG/DL (ref 65–140)
HCT VFR BLD AUTO: 33.5 % (ref 34.8–46.1)
HGB BLD-MCNC: 10.9 G/DL (ref 11.5–15.4)
IMM GRANULOCYTES # BLD AUTO: 0.03 THOUSAND/UL (ref 0–0.2)
IMM GRANULOCYTES NFR BLD AUTO: 0 % (ref 0–2)
LYMPHOCYTES # BLD AUTO: 2.27 THOUSANDS/ÂΜL (ref 0.6–4.47)
LYMPHOCYTES NFR BLD AUTO: 23 % (ref 14–44)
MCH RBC QN AUTO: 29.5 PG (ref 26.8–34.3)
MCHC RBC AUTO-ENTMCNC: 32.5 G/DL (ref 31.4–37.4)
MCV RBC AUTO: 91 FL (ref 82–98)
MONOCYTES # BLD AUTO: 0.78 THOUSAND/ÂΜL (ref 0.17–1.22)
MONOCYTES NFR BLD AUTO: 8 % (ref 4–12)
NEUTROPHILS # BLD AUTO: 6.47 THOUSANDS/ÂΜL (ref 1.85–7.62)
NEUTS SEG NFR BLD AUTO: 66 % (ref 43–75)
NRBC BLD AUTO-RTO: 0 /100 WBCS
P AXIS: 57 DEGREES
PLATELET # BLD AUTO: 219 THOUSANDS/UL (ref 149–390)
PMV BLD AUTO: 9.6 FL (ref 8.9–12.7)
PR INTERVAL: 132 MS
QRS AXIS: 59 DEGREES
QRSD INTERVAL: 72 MS
QT INTERVAL: 432 MS
QTC INTERVAL: 442 MS
RBC # BLD AUTO: 3.7 MILLION/UL (ref 3.81–5.12)
RSV RNA RESP QL NAA+PROBE: NEGATIVE
SARS-COV-2 RNA RESP QL NAA+PROBE: NEGATIVE
T WAVE AXIS: 70 DEGREES
VENTRICULAR RATE: 63 BPM
WBC # BLD AUTO: 9.84 THOUSAND/UL (ref 4.31–10.16)

## 2023-05-06 RX ADMIN — LEVOTHYROXINE SODIUM 100 MCG: 100 TABLET ORAL at 06:48

## 2023-05-06 RX ADMIN — UMECLIDINIUM 1 PUFF: 62.5 AEROSOL, POWDER ORAL at 08:11

## 2023-05-06 RX ADMIN — ASPIRIN 81 MG: 81 TABLET, COATED ORAL at 08:13

## 2023-05-06 RX ADMIN — HEPARIN SODIUM 5000 UNITS: 5000 INJECTION INTRAVENOUS; SUBCUTANEOUS at 06:48

## 2023-05-06 NOTE — ASSESSMENT & PLAN NOTE
No results found for: HGBA1C    Recent Labs     05/05/23  1124 05/05/23  1601 05/05/23  2102 05/06/23  0748   POCGLU 133 91 107 117       Blood Sugar Average: Last 72 hrs:  (P) 111 5     Relatively good control for patient's age  Will provide with diabetic diet and ISS

## 2023-05-06 NOTE — ASSESSMENT & PLAN NOTE
Right hip surgery done on 4/9 at [facility]  Was due to remove staples at this time  Patient s/p fall at home and hip displacement  Was manually reduced in ER by orthopedics  Pain is controlled  PT/OT consulted  Recommended knee immobilizer while in bed to prevent hyperflexion and risk of recurrent dislocation  Patient is cleared by orthopedics and recommended outpatient follow-up with them in 10 to 14 days    Patient will be discharged to [facility] for skilled nursing rehab

## 2023-05-06 NOTE — ASSESSMENT & PLAN NOTE
Right hip surgery done on 4/9 at [facility]  Was due to remove staples at this time  Patient s/p fall at home and hip displacement  Was manually reduced in ER by orthopedics  Pain is controlled  PT/OT consulted  Recommended knee immobilizer while in bed to prevent hyperflexion and risk of recurrent dislocation  Patient is cleared by orthopedics and recommended outpatient follow-up with them in 10 to 14 days

## 2023-05-06 NOTE — ASSESSMENT & PLAN NOTE
No results found for: HGBA1C    Recent Labs     05/05/23  1601 05/05/23  2102 05/06/23  0748 05/06/23  1109   POCGLU 91 107 117 107       Blood Sugar Average: Last 72 hrs:  (P) 111     Relatively good control for patient's age  Will provide with diabetic diet and ISS

## 2023-05-06 NOTE — ASSESSMENT & PLAN NOTE
Frail patient with recent hip replacement   Just got home from the nursing facility and now is here due to fall and hip displacement    S/p reduction in ED by orthopedic surgery  PT OT consult appreciated  [facility]

## 2023-05-06 NOTE — CASE MANAGEMENT
Case Management Discharge Planning Note    Patient name Brenda Beth  Location [address] 229/-[id number] MRN [id number]  : 1934 Date 2023       Current Admission Date: 2023  Current Admission Diagnosis:Ambulatory dysfunction   Patient Active Problem List    Diagnosis Date Noted   • History of right hip replacement 2023   • Ambulatory dysfunction 2023   • Generalized weakness 2023   • CKD (chronic kidney disease) stage 3, GFR 30-59 ml/min ([facility] ) 2023   • Type 2 diabetes mellitus, without long-term current use of insulin ([facility] ) 2023   • Hypothyroidism 2023   • COPD (chronic obstructive pulmonary disease) ([facility] ) 2023      LOS (days): 1  Geometric Mean LOS (GMLOS) (days): 2 70  Days to GMLOS:1 8     OBJECTIVE:  Risk of Unplanned Readmission Score: 14 95      Current admission status: Inpatient   Preferred Pharmacy:   [facility] [address] [place], [address] [place], [place] [address]  Phone: [phone] Fax: [address] [facility] [address] [place] [address] [place], [place] [address]  Phone: [phone] Fax: [phone]    Primary Care Provider: Lisa Lara MD    Primary Insurance: [facility]  Secondary Insurance:     DISCHARGE DETAILS:    Discharge planning discussed with[de-identified] patient  Freedom of Choice: Yes  Comments - Freedom of Choice: chose [facility] CM contacted family/caregiver?: Yes  Were Treatment Team discharge recommendations reviewed with patient/caregiver?: Yes  Did patient/caregiver verbalize understanding of patient care needs?: Yes  Were patient/caregiver advised of the risks associated with not following Treatment Team discharge recommendations?: Yes    Contacts  Patient Contacts: Jean Carlos Spore - son  Relationship to Patient[de-identified] Family  Contact Method: Phone  Phone Number: [phone]  Reason/Outcome: Continuity of Care, Emergency Contact, Discharge [address]         [place] the [place] [address] at discharge?: No    DME Referral Provided  Referral made for DME?: No    Other Referral/Resources/Interventions Provided:  Interventions: Short Term Rehab    Treatment Team Recommendation: Short Term Rehab  Discharge Destination Plan[de-identified] Short Term Rehab  Transport at Discharge : [facility]  Dispatcher Contacted: Yes  Number/Name of Dispatcher: Michael Taylor by Pepet and Unit #): [facility]  ETA of Transport (Date): 05/06/23  ETA of Transport (Time): 1615  Transport Service Arrived: Yes  Transfer Mode: Stretcher  Accompanied by: EMS personnel     Accepting Facility Name, [address] : [facility]  Receiving Facility/Agency Phone Number: Phone: [phone]  Facility/Agency Fax Number: Fax: [phone]

## 2023-05-06 NOTE — ASSESSMENT & PLAN NOTE
Frail patient with recent hip replacement   Just got home from the nursing facility and now is here due to fall and hip displacement    S/p reduction in ED by orthopedic surgery  PT OT consult appreciated  Awaiting placement to skilled rehab Additional Notes: Patient consent was obtained to proceed with the visit and recommended plan of care after discussion of all risks and benefits, including the risks of COVID-19 exposure. Detail Level: Simple

## 2023-05-06 NOTE — PLAN OF CARE
Problem: Potential for Falls  Goal: Patient will remain free of falls  Description: INTERVENTIONS:  - Educate patient/family on patient safety including physical limitations  - Instruct patient to call for assistance with activity   - Consult OT/PT to assist with strengthening/mobility   - Keep Call bell within reach  - Keep bed low and locked with side rails adjusted as appropriate  - Keep care items and personal belongings within reach  - Initiate and maintain comfort rounds  - Make Fall Risk Sign visible to staff  - Apply yellow socks and bracelet for high fall risk patients  - Consider moving patient to room near nurses station  Outcome: Progressing     Problem: PAIN - ADULT  Goal: Verbalizes/displays adequate comfort level or baseline comfort level  Description: Interventions:  - Encourage patient to monitor pain and request assistance  - Assess pain using appropriate pain scale  - Administer analgesics based on type and severity of pain and evaluate response  - Implement non-pharmacological measures as appropriate and evaluate response  - Consider cultural and social influences on pain and pain management  - Notify physician/advanced practitioner if interventions unsuccessful or patient reports new pain  Outcome: Progressing     Problem: INFECTION - ADULT  Goal: Absence or prevention of progression during hospitalization  Description: INTERVENTIONS:  - Assess and monitor for signs and symptoms of infection  - Monitor lab/diagnostic results  - Monitor all insertion sites, i e  indwelling lines, tubes, and drains  - Monitor endotracheal if appropriate and nasal secretions for changes in amount and color  - [place] appropriate cooling/warming therapies per order  - Administer medications as ordered  - Instruct and encourage patient and family to use good hand hygiene technique  - Identify and instruct in appropriate isolation precautions for identified infection/condition  Outcome: Progressing  Goal: Absence of fever/infection during neutropenic period  Description: INTERVENTIONS:  - Monitor WBC    Outcome: Progressing     Problem: SAFETY ADULT  Goal: Patient will remain free of falls  Description: INTERVENTIONS:  - Educate patient/family on patient safety including physical limitations  - Instruct patient to call for assistance with activity   - Consult OT/PT to assist with strengthening/mobility   - Keep Call bell within reach  - Keep bed low and locked with side rails adjusted as appropriate  - Keep care items and personal belongings within reach  - Initiate and maintain comfort rounds  - Make Fall Risk Sign visible to staff  - Apply yellow socks and bracelet for high fall risk patients  - Consider moving patient to room near nurses station  Outcome: Progressing

## 2023-05-06 NOTE — PLAN OF CARE
Problem: PAIN - ADULT  Goal: Verbalizes/displays adequate comfort level or baseline comfort level  Description: Interventions:  - Encourage patient to monitor pain and request assistance  - Assess pain using appropriate pain scale  - Administer analgesics based on type and severity of pain and evaluate response  - Implement non-pharmacological measures as appropriate and evaluate response  - Consider cultural and social influences on pain and pain management  - Notify physician/advanced practitioner if interventions unsuccessful or patient reports new pain  Outcome: Progressing     Problem: INFECTION - ADULT  Goal: Absence or prevention of progression during hospitalization  Description: INTERVENTIONS:  - Assess and monitor for signs and symptoms of infection  - Monitor lab/diagnostic results  - Monitor all insertion sites, i e  indwelling lines, tubes, and drains  - Monitor endotracheal if appropriate and nasal secretions for changes in amount and color  - [place] appropriate cooling/warming therapies per order  - Administer medications as ordered  - Instruct and encourage patient and family to use good hand hygiene technique  - Identify and instruct in appropriate isolation precautions for identified infection/condition  Outcome: Progressing  Goal: Absence of fever/infection during neutropenic period  Description: INTERVENTIONS:  - Monitor WBC    Outcome: Progressing    Goal: Maintain or return to baseline ADL function  Description: INTERVENTIONS:  -  Assess patient's ability to carry out ADLs; assess patient's baseline for ADL function and identify physical deficits which impact ability to perform ADLs (bathing, care of mouth/teeth, toileting, grooming, dressing, etc )  - Assess/evaluate cause of self-care deficits   - Assess range of motion  - Assess patient's mobility; develop plan if impaired  - Assess patient's need for assistive devices and provide as appropriate  - Encourage maximum independence but intervene and supervise when necessary  - Involve family in performance of ADLs  - Assess for home care needs following discharge   - Consider OT consult to assist with ADL evaluation and planning for discharge  - Provide patient education as appropriate  Outcome: Progressing     Problem: DISCHARGE PLANNING  Goal: Discharge to home or other facility with appropriate resources  Description: INTERVENTIONS:  - Identify barriers to discharge w/patient and caregiver  - Arrange for needed discharge resources and transportation as appropriate  - Identify discharge learning needs (meds, wound care, etc )  - Arrange for interpretive services to assist at discharge as needed  - Refer to Case Management Department for coordinating discharge planning if the patient needs post-hospital services based on physician/advanced practitioner order or complex needs related to functional status, cognitive ability, or social support system  Outcome: Progressing     Problem: Knowledge Deficit  Goal: Patient/family/caregiver demonstrates understanding of disease process, treatment plan, medications, and discharge instructions  Description: Complete learning assessment and assess knowledge base  Interventions:  - Provide teaching at level of understanding  - Provide teaching via preferred learning methods  Outcome: Progressing     Problem: Nutrition/Hydration-ADULT  Goal: Nutrient/Hydration intake appropriate for improving, restoring or maintaining nutritional needs  Description: Monitor and assess patient's nutrition/hydration status for malnutrition  Collaborate with interdisciplinary team and initiate plan and interventions as ordered  Monitor patient's weight and dietary intake as ordered or per policy  Utilize nutrition screening tool and intervene as necessary  Determine patient's food preferences and provide high-protein, high-caloric foods as appropriate       INTERVENTIONS:  - Monitor oral intake, urinary output, labs, and treatment plans  - Assess nutrition and hydration status and recommend course of action  - Evaluate amount of meals eaten  - Assist patient with eating if necessary   - Allow adequate time for meals  - Recommend/ encourage appropriate diets, oral nutritional supplements, and vitamin/mineral supplements  - Order, calculate, and assess calorie counts as needed  - Recommend, monitor, and adjust tube feedings and TPN/PPN based on assessed needs  - Assess need for intravenous fluids  - Provide specific nutrition/hydration education as appropriate  - Include patient/family/caregiver in decisions related to nutrition  Outcome: Progressing     Problem: MOBILITY - ADULT  Goal: Maintain or return to baseline ADL function  Description: INTERVENTIONS:  -  Assess patient's ability to carry out ADLs; assess patient's baseline for ADL function and identify physical deficits which impact ability to perform ADLs (bathing, care of mouth/teeth, toileting, grooming, dressing, etc )  - Assess/evaluate cause of self-care deficits   - Assess range of motion  - Assess patient's mobility; develop plan if impaired  - Assess patient's need for assistive devices and provide as appropriate  - Encourage maximum independence but intervene and supervise when necessary  - Involve family in performance of ADLs  - Assess for home care needs following discharge   - Consider OT consult to assist with ADL evaluation and planning for discharge  - Provide patient education as appropriate  Outcome: Progressing     Problem: Prexisting or High Potential for Compromised Skin Integrity  Goal: Skin integrity is maintained or improved  Description: INTERVENTIONS:  - Identify patients at risk for skin breakdown  - Assess and monitor skin integrity  - Assess and monitor nutrition and hydration status  - Monitor labs   - Assess for incontinence   - Turn and reposition patient  - Assist with mobility/ambulation  - Relieve pressure over bony prominences  - Avoid friction and shearing  - Provide appropriate hygiene as needed including keeping skin clean and dry  - Evaluate need for skin moisturizer/barrier cream  - Collaborate with interdisciplinary team   - Patient/family teaching  - Consider wound care consult   Outcome: Progressing

## 2023-05-06 NOTE — PROGRESS NOTES
[facility]  Progress Note  Name: Maribel Aiken  MRN: [id number]  Unit/Bed#: -01 I Date of Admission: 5/4/2023   Date of Service: 5/6/2023 I Hospital Day: 1    Assessment/Plan   History of right hip replacement  Assessment & Plan  Right hip surgery done on 4/9 at [facility]  Was due to remove staples at this time  Patient s/p fall at home and hip displacement  Was manually reduced in ER by orthopedics  Pain is controlled  PT/OT consulted  Recommended knee immobilizer while in bed to prevent hyperflexion and risk of recurrent dislocation  Patient is cleared by orthopedics and recommended outpatient follow-up with them in 10 to 14 days  COPD (chronic obstructive pulmonary disease) ([facility])  Assessment & Plan  Stable    Hypothyroidism  Assessment & Plan  Continue Levothyroxine    Type 2 diabetes mellitus, without long-term current use of insulin [facility])  Assessment & Plan  No results found for: HGBA1C    Recent Labs     05/05/23  1124 05/05/23  1601 05/05/23  2102 05/06/23  0748   POCGLU 133 91 107 117       Blood Sugar Average: Last 72 hrs:  (P) 111 5     Relatively good control for patient's age  Will provide with diabetic diet and ISS  CKD (chronic kidney disease) stage 3, GFR 30-59 ml/min [facility])  Assessment & Plan  Lab Results   Component Value Date    EGFR 30 05/05/2023    EGFR 30 05/04/2023    EGFR 29 03/26/2023    CREATININE 1 51 (H) 05/05/2023    CREATININE 1 51 (H) 05/04/2023    CREATININE 1 57 (H) 03/26/2023   Baseline creatinine  Avoid nephrotoxic agents    * Ambulatory dysfunction  Assessment & Plan  Frail patient with recent hip replacement   Just got home from the nursing facility and now is here due to fall and hip displacement  S/p reduction in ED by orthopedic surgery  PT OT consult appreciated  Awaiting placement to skilled rehab             Labs & Imaging: I have personally reviewed pertinent reports  VTE Prophylaxis: in place      Code "Status:   Level 3 - DNAR and DNI    Patient Centered Rounds: I have performed bedside rounds with nursing staff today  Discussions with Specialists or Other Care Team Provider: GABY    Education and Discussions with Family / Patient: Jonathon Hunter    Current Length of Stay: 1 day(s)    Current Patient Status: Inpatient   Certification Statement: The patient will continue to require additional inpatient hospital stay due to see my assessment and plan  Subjective:   Patient is seen and examined at bedside  No new complaints  Afebrile  All other ROS are negative  Objective:    Vitals: Blood pressure 147/64, pulse 66, temperature 97 5 °F (36 4 °C), resp  rate 16, height 5' 1\" (1 549 m), weight 57 4 kg (126 lb 8 7 oz), SpO2 94 %  ,Body mass index is 23 91 kg/m²  SPO2 RA Rest    Flowsheet Row ED to Hosp-Admission (Current) from 5/4/2023 in [address] Med Surg Unit   SpO2 94 %   SpO2 Activity At Rest   O2 Device None (Room air)   O2 Flow Rate 10 L/min        I&O:     Intake/Output Summary (Last 24 hours) at 5/6/2023 1034  Last data filed at 5/6/2023 0801  Gross per 24 hour   Intake 590 ml   Output 575 ml   Net 15 ml       Physical Exam:    General- Alert, lying comfortably in bed  Not in any acute distress  Neck- Supple, No JVD  CVS- regular, S1 and S2 normal  Chest- Bilateral Air entry, No rhochi, crackles or wheezing present  Abdomen- soft, nontender, not distended, no guarding or rigidity, BS+  Extremities-  No pedal edema, No calf tenderness  CNS-   Alert, awake  No focal deficits present  Invasive Devices     Peripheral Intravenous Line  Duration           Peripheral IV 05/04/23 Distal;Left;Upper;Ventral (anterior) Arm 1 day          Drain  Duration           External Urinary Catheter 2 days                      Social History  reviewed  History reviewed  No pertinent family history   reviewed    Meds:  Current Facility-Administered Medications   Medication Dose Route Frequency Provider " Last Rate Last Admin   • acetaminophen (TYLENOL) tablet 650 mg  650 mg Oral Q6H PRN Shashi Arechiga PA-C   650 mg at 05/04/23 2209   • aspirin (ECOTRIN LOW STRENGTH) EC tablet 81 mg  81 mg Oral Daily Shashi Arechiga PA-C   81 mg at 05/06/23 0813   • heparin (porcine) subcutaneous injection 5,000 Units  5,000 Units Subcutaneous [facility] Shashi Arechiga PA-C   5,000 Units at 05/06/23 5477   • insulin lispro (HumaLOG) 100 units/mL subcutaneous injection 1-6 Units  1-6 Units Subcutaneous TID AC Shashi Arechiga PA-C   1 Units at 05/04/23 1708   • insulin lispro (HumaLOG) 100 units/mL subcutaneous injection 1-6 Units  1-6 Units Subcutaneous HS Shashi Arechiga PA-C       • levalbuterol [facility]) inhalation solution 1 25 mg  1 25 mg Nebulization Q8H PRN Shsahi Arechiga PA-C   1 25 mg at 05/04/23 2049   • levothyroxine tablet 100 mcg  100 mcg Oral Daily Shashi Arechiga PA-C   100 mcg at 05/06/23 5991   • ondansetron (ZOFRAN) injection 4 mg  4 mg Intravenous Once Shashi Arechiga PA-C       • umeclidinium 62 5 mcg/actuation inhaler AEPB 1 puff  1 puff Inhalation Daily Shashi Arechiga PA-C   1 puff at 05/06/23 6444      Medications Prior to Admission   Medication   • acetaminophen (TYLENOL) 500 mg tablet   • aspirin (ECOTRIN LOW STRENGTH) 81 mg EC tablet   • levothyroxine 100 mcg tablet   • nitrofurantoin (MACROBID) 100 mg capsule   • sitaGLIPtin (JANUVIA) 25 mg tablet   • tiotropium (Spiriva Respimat) 2 5 MCG/ACT AERS inhaler   • albuterol (PROVENTIL HFA,VENTOLIN HFA) 90 mcg/act inhaler   • Fesoterodine Fumarate ER 8 MG TB24       Labs:  Results from last 7 days   Lab Units 05/06/23  0900 05/05/23  0459 05/04/23  0425   WBC Thousand/uL 9 84 10 23* 12 06*   HEMOGLOBIN g/dL 10 9* 9 8* 12 3   HEMATOCRIT % 33 5* 30 4* 38 6   PLATELETS Thousands/uL 219 227 285   NEUTROS PCT % 66 64 61   LYMPHS PCT % 23 24 28   MONOS PCT % 8 9 7   EOS PCT % 2 2 2     Results from last 7 days   Lab Units 05/05/23  0453 05/04/23  0425   POTASSIUM mmol/L 3 7 3 7   CHLORIDE mmol/L 109* 106   CO2 mmol/L 28 26   BUN mg/dL 30* 34*   CREATININE mg/dL 1 51* 1 51*   CALCIUM mg/dL 8 9 10 0     No results found for: TROPONINI, CKMB, CKTOTAL      Lab Results   Component Value Date    BLOODCX No Growth After 5 Days  02/27/2023    BLOODCX No Growth After 5 Days  02/27/2023    URINECX >100,000 cfu/ml Escherichia coli (A) 02/28/2023    URINECX 20,000-29,000 cfu/ml 02/28/2023         Imaging:  Results for orders placed during the hospital encounter of 05/04/23    XR Trauma chest portable    Narrative  CHEST    INDICATION:   TRAUMA  COMPARISON: Chest x-ray dated March 26, 2023  EXAM PERFORMED/VIEWS:  XR CHEST PORTABLE      FINDINGS:    Cardiomediastinal silhouette appears unremarkable  The lungs are clear  No pneumothorax or pleural effusion  Osseous structures appear within normal limits for patient age  Impression  No acute cardiopulmonary disease  Workstation performed: [id number]    No results found for this or any previous visit        Last 24 Hours Medication List:   Current Facility-Administered Medications   Medication Dose Route Frequency Provider Last Rate   • acetaminophen  650 mg Oral Q6H PRN Odilia Villaseñor PA-C     • aspirin  81 mg Oral Daily Odilia Villaseñor PA-C     • heparin (porcine)  5,000 Units Subcutaneous [facility] Odilia Villase[facility] PA-C     • insulin lispro  1-6 Units Subcutaneous TID AC Odilia Villaseñor PA-C     • insulin lispro  1-6 Units Subcutaneous HS Odilia Villaseñor PA-C     • levalbuterol  1 25 mg Nebulization Q8H PRN Odilia Villaseñor PA-C     • levothyroxine  100 mcg Oral Daily Odilia Villaseñor PA-C     • ondansetron  4 mg Intravenous Once Odilia Villaseñor PA-C     • umeclidinium  1 puff Inhalation Daily Odilia Villaseñor PA-C          Today, Patient Was Seen By: Dominic Renteria MD    ** Please Note: Dictation voice to text software may have been used in the creation of this document   **

## 2023-05-07 ENCOUNTER — NURSING HOME VISIT (OUTPATIENT)
Dept: FAMILY MEDICINE CLINIC | Facility: CLINIC | Age: 88
End: 2023-05-07

## 2023-05-07 DIAGNOSIS — E11.9 TYPE 2 DIABETES MELLITUS WITHOUT COMPLICATION, WITHOUT LONG-TERM CURRENT USE OF INSULIN (HCC): ICD-10-CM

## 2023-05-07 DIAGNOSIS — E03.8 OTHER SPECIFIED HYPOTHYROIDISM: ICD-10-CM

## 2023-05-07 DIAGNOSIS — R53.1 GENERALIZED WEAKNESS: ICD-10-CM

## 2023-05-07 DIAGNOSIS — Z96.641 HISTORY OF RIGHT HIP REPLACEMENT: Primary | ICD-10-CM

## 2023-05-07 DIAGNOSIS — N18.30 STAGE 3 CHRONIC KIDNEY DISEASE, UNSPECIFIED WHETHER STAGE 3A OR 3B CKD (HCC): ICD-10-CM

## 2023-05-07 DIAGNOSIS — J44.9 CHRONIC OBSTRUCTIVE PULMONARY DISEASE, UNSPECIFIED COPD TYPE (HCC): ICD-10-CM

## 2023-05-07 DIAGNOSIS — R26.2 AMBULATORY DYSFUNCTION: ICD-10-CM

## 2023-05-07 NOTE — PROGRESS NOTES
[address]  [facility]: [facility]    NAME: Dre Pleitez  AGE: 80 y o  SEX: female    DATE OF ENCOUNTER: 5/7/2023    Code status:  DNR w/ Hospitalization    Assessment and Plan     1  History of right hip replacement    2  Ambulatory dysfunction    3  Chronic obstructive pulmonary disease, unspecified COPD type ([facility] )    4  Stage 3 chronic kidney disease, unspecified whether stage 3a or 3b CKD ([facility] )    5  Generalized weakness    6  Type 2 diabetes mellitus without complication, without long-term current use of insulin ([facility])    7  Other specified hypothyroidism        All medications and routine orders were reviewed and updated as needed  Plan discussed with: Family member    Chief Complaint     Seen for admission at [address] [place] [address]    History of Present Illness     80-year-old female here after sustaining a fall resulting in dislocation of the right hip  The patient had recently had a right hip arthroplasty due to a fall resulting in a fracture  She was due to have her's sutures removed and unfortunately fell and dislocated the hip  This was relocated in the emergency room by orthopedics  She is sent here for physical therapy and ambulation training  The patient has some baseline COPD as well as type 2 diabetes  She reports that she has no pain at rest   Her bowels moved yesterday  Her appetite is poor  Her fluid intake has been suboptimal   She denies swallowing issues  She has no heartburn  She has no dysuria  HISTORY:  Past Medical History:   Diagnosis Date   • COPD (chronic obstructive pulmonary disease) ([facility] )    • Diabetes mellitus ([facility] )    • Disease of thyroid gland    • Hypertension      History reviewed  No pertinent surgical history  History reviewed  No pertinent family history  Social History     Socioeconomic History   • Marital status:       Spouse name: None   • Number of children: None   • Years of education: None   • Highest education level: None   Occupational History   • None   Tobacco Use   • Smoking status: Former     Types: Cigarettes     Quit date: 2022     Years since quittin 3   • Smokeless tobacco: None   Vaping Use   • Vaping Use: Never used   Substance and Sexual Activity   • Alcohol use: Never   • Drug use: Never   • Sexual activity: Not Currently   Other Topics Concern   • None   Social History Narrative   • None     Social Determinants of Health     Financial Resource Strain: Not on file   Food Insecurity: No Food Insecurity   • Worried About Running Out of Food in the Last Year: Never true   • Ran Out of Food in the Last Year: Never true   Transportation Needs: No Transportation Needs   • Lack of Transportation (Medical): No   • Lack of Transportation (Non-Medical): No   Physical Activity: Not on file   Stress: Not on file   Social Connections: Not on file   Intimate Partner Violence: Not on file   Housing Stability: Low Risk    • Unable to Pay for Housing in the Last Year: No   • Number of Places Lived in the Last Year: 1   • Unstable Housing in the Last Year: No       Allergies: Allergies   Allergen Reactions   • Erythromycin Other (See Comments)     unknown   • Iodine - Food Allergy Hives   • Lidocaine Hives   • Metformin Diarrhea   • Penicillin V Hives     TOLERATED CEFTRIAXONE 2022   • Rosuvastatin Myalgia   • Sulfamethoxazole-Trimethoprim Hives       Review of Systems     Review of Systems   Constitutional: Negative for activity change, appetite change, chills, diaphoresis, fatigue and unexpected weight change  HENT: Negative for congestion, ear discharge, ear pain, hearing loss, nosebleeds and rhinorrhea  Eyes: Negative for pain, redness, itching and visual disturbance  Respiratory: Negative for cough, choking, chest tightness and shortness of breath  Cardiovascular: Negative for chest pain and leg swelling     Gastrointestinal: Negative for abdominal pain, blood in stool, constipation, diarrhea and nausea  Endocrine: Negative for cold intolerance, polydipsia and polyphagia  Genitourinary: Negative for dysuria, frequency, hematuria and urgency  Musculoskeletal: Positive for arthralgias and gait problem  Negative for joint swelling, neck pain and neck stiffness  Skin: Negative for color change and rash  Allergic/Immunologic: Negative for environmental allergies and food allergies  Neurological: Positive for weakness  Negative for dizziness, tremors, seizures, speech difficulty, numbness and headaches  Hematological: Negative for adenopathy  Does not bruise/bleed easily  Psychiatric/Behavioral: Positive for dysphoric mood  Negative for behavioral problems, hallucinations and self-injury  Medications and orders     All medications reviewed and updated in Nursing Home EMR  Objective     Vitals: per nursing home record    Physical Exam  Constitutional:       Appearance: She is well-developed  HENT:      Head: Normocephalic and atraumatic  Right Ear: External ear normal       Left Ear: External ear normal       Mouth/Throat:      Pharynx: No oropharyngeal exudate  Eyes:      General: No scleral icterus  Right eye: No discharge  Left eye: No discharge  Conjunctiva/sclera: Conjunctivae normal       Pupils: Pupils are equal, round, and reactive to light  Neck:      Thyroid: No thyromegaly  Cardiovascular:      Rate and Rhythm: Normal rate and regular rhythm  Heart sounds: Normal heart sounds  No murmur heard  No gallop  Pulmonary:      Effort: Pulmonary effort is normal  No respiratory distress  Breath sounds: Normal breath sounds  No wheezing or rales  Abdominal:      General: Bowel sounds are normal       Palpations: Abdomen is soft  There is no mass  Tenderness: There is no guarding or rebound  Musculoskeletal:         General: Tenderness present  No deformity  Normal range of motion        Cervical back: Normal range of motion and neck supple  Comments: Immobilizer in place right leg   Lymphadenopathy:      Cervical: No cervical adenopathy  Skin:     General: Skin is warm and dry  Findings: No rash  Neurological:      Mental Status: She is alert and oriented to person, place, and time  Cranial Nerves: No cranial nerve deficit  Motor: Weakness present  Coordination: Coordination normal       Gait: Gait abnormal       Deep Tendon Reflexes: Reflexes are normal and symmetric  Reflexes normal    Psychiatric:         Behavior: Behavior normal          Thought Content: Thought content normal          Judgment: Judgment normal          Pertinent Laboratory/Diagnostic Studies: The following labs/studies were reviewed please see chart or hospital paperwork for details  Diagnostic studies from the hospital were reviewed    - Admit for PT OT and medical therapy  We will schedule Tylenol 975 every 8 hours  We will monitor her labs      Juancho Vences DO  5/7/2023 11:11 AM

## 2023-05-08 ENCOUNTER — HOME CARE VISIT (OUTPATIENT)
Dept: HOME HEALTH SERVICES | Facility: HOME HEALTHCARE | Age: 88
End: 2023-05-08

## 2023-05-08 ENCOUNTER — NURSING HOME VISIT (OUTPATIENT)
Dept: FAMILY MEDICINE CLINIC | Facility: CLINIC | Age: 88
End: 2023-05-08

## 2023-05-08 DIAGNOSIS — I50.32 CHRONIC HEART FAILURE WITH PRESERVED EJECTION FRACTION (HCC): ICD-10-CM

## 2023-05-08 DIAGNOSIS — N18.32 TYPE 2 DIABETES MELLITUS WITH STAGE 3B CHRONIC KIDNEY DISEASE, WITHOUT LONG-TERM CURRENT USE OF INSULIN (HCC): ICD-10-CM

## 2023-05-08 DIAGNOSIS — Z96.641 HISTORY OF RIGHT HIP REPLACEMENT: Primary | ICD-10-CM

## 2023-05-08 DIAGNOSIS — R26.2 AMBULATORY DYSFUNCTION: ICD-10-CM

## 2023-05-08 DIAGNOSIS — I10 PRIMARY HYPERTENSION: ICD-10-CM

## 2023-05-08 DIAGNOSIS — E11.22 TYPE 2 DIABETES MELLITUS WITH STAGE 3B CHRONIC KIDNEY DISEASE, WITHOUT LONG-TERM CURRENT USE OF INSULIN (HCC): ICD-10-CM

## 2023-05-08 DIAGNOSIS — J43.1 PANLOBULAR EMPHYSEMA (HCC): ICD-10-CM

## 2023-05-08 DIAGNOSIS — E03.8 OTHER SPECIFIED HYPOTHYROIDISM: ICD-10-CM

## 2023-05-08 NOTE — PROGRESS NOTES
[address]  Facility: [facility]    NAME: Tamela Michel  AGE: 80 y o  SEX: female    DATE OF ENCOUNTER: 5/8/2023    Code status:  DNR w/ Hospitalization    Assessment and Plan     1  History of right hip replacement    2  Type 2 diabetes mellitus with stage 3b chronic kidney disease, without long-term current use of insulin ([facility] [address] )    3  Other specified hypothyroidism    4  Chronic heart failure with preserved ejection fraction (HCC)    5  Panlobular emphysema ([facility] [address] )    6  Primary hypertension    7  Ambulatory dysfunction        All medications and routine orders were reviewed and updated as needed  Plan discussed with: Patient, nursing staff    Chief Complaint     Interim evaluation    History of Present Illness     Maya Baker is assessed for follow up  She is oob to chair, eating breakfast   Notes ECHOLS with any exertion  Currently on incruse daily with out OLAMIDE? Hx HFpEF, unsure of last Echo  EKG hx NSR, sounds irregular today with murmur  Awaiting CBC/BMP ordered for today  Generalized weakness, poor appetite  FBS stable, unsure of last A1C nor TSH? Denies pain  Multiple falls with at least 4 hospitalizations in the last 6months with concurrent UTI  HX OAB  Bowels moved 5/6/23  Denies s/s dysuria  Reports using melatonin at  for sleep, requesting this to be ordered, unsure of dose  Adherent to ambulation restrictions  Wearing right immobilizer while in bed  Afebrile, HR 60-80  Bp 140-180/80s  The following portions of the patient's history were reviewed and updated as appropriate: current medications, past family history, past medical history, past social history, past surgical history and problem list     Allergies:   Allergies   Allergen Reactions   • Erythromycin Other (See Comments)     unknown   • Iodine - Food Allergy Hives   • Lidocaine Hives   • Metformin Diarrhea   • Penicillin V Hives     TOLERATED CEFTRIAXONE 12/2022 • Rosuvastatin Myalgia   • Sulfamethoxazole-Trimethoprim Hives       Review of Systems     Review of Systems   Constitutional: Positive for appetite change and fatigue  Negative for activity change, chills and fever  HENT: Positive for hearing loss  Negative for congestion, ear pain, postnasal drip and sinus pain  Eyes: Negative  Respiratory: Negative  Negative for cough, chest tightness and shortness of breath  Cardiovascular: Negative  Negative for chest pain and leg swelling  Gastrointestinal: Negative  Negative for constipation and diarrhea  Endocrine: Negative  Genitourinary: Negative  Negative for difficulty urinating and dysuria  Musculoskeletal: Positive for gait problem  Skin: Negative  Allergic/Immunologic: Negative  Negative for immunocompromised state  Neurological: Positive for weakness  Negative for dizziness and light-headedness  Hematological: Negative  Psychiatric/Behavioral: Positive for decreased concentration and sleep disturbance  Medications and orders     All medications reviewed and updated in Nursing Home EMR  Objective     Vitals: per nursing home records    Physical Exam  Vitals and nursing note reviewed  Constitutional:       General: She is awake  Appearance: She is ill-appearing  Comments: frail   HENT:      Head: Normocephalic and atraumatic  Right Ear: Tympanic membrane, ear canal and external ear normal       Left Ear: Tympanic membrane, ear canal and external ear normal       Nose: Nose normal       Mouth/Throat:      Mouth: Mucous membranes are moist       Pharynx: Oropharynx is clear  Eyes:      Extraocular Movements: Extraocular movements intact  Conjunctiva/sclera: Conjunctivae normal       Pupils: Pupils are equal, round, and reactive to light  Cardiovascular:      Rate and Rhythm: Normal rate  Rhythm irregular  Pulses: Normal pulses  Heart sounds: Murmur heard     Pulmonary:      Effort: Pulmonary effort is normal       Breath sounds: Normal breath sounds  Comments: Diminished throughout  Abdominal:      General: Bowel sounds are normal       Palpations: Abdomen is soft  Musculoskeletal:         General: Normal range of motion  Cervical back: Normal range of motion and neck supple  Right lower leg: No edema  Left lower leg: No edema  Skin:     General: Skin is warm and dry  Comments: PVD   Neurological:      General: No focal deficit present  Mental Status: She is alert  She is disoriented  GCS: GCS eye subscore is 4  GCS verbal subscore is 4  GCS motor subscore is 6  Motor: Weakness present  Gait: Gait abnormal    Psychiatric:         Mood and Affect: Mood normal          Speech: Speech normal          Behavior: Behavior is slowed  Behavior is cooperative  Thought Content: Thought content normal          Judgment: Judgment normal       Comments: Alert to person, time, not place  Pertinent Laboratory/Diagnostic Studies: The following labs and studies were reviewed please see chart or hospital paperwork for details  Pulse ox with ambulation  Add Albuterol inhaler prn dyspnea/wheeze  Add hemoglobin A1c and TSH to CBC/BMP  Melatonin 3mg po qHS  Add Norvasc 2 5mg daily        Jean Paul Stehpens  5/8/2023 11:48 AM

## 2023-05-08 NOTE — UTILIZATION REVIEW
Initial Clinical Review    Admission: Date/Time/Statement: 5/4/23 0803 observation AND CHANGED 5/5/23 1550 INPATIENT RE: PATIENT NEEDS > 2 MIDNIGHT STAY DUE TO AMBULATORY DYSFUNCTION WITH RECENT HIP ARTHROPLASTY, NOW WITH DISLOCATION AND REDUCTION, REQUIRES REHAB  Admission Orders (From admission, onward)     Ordered        05/05/23 1550  Inpatient Admission  Once                      Orders Placed This Encounter   Procedures   • Inpatient Admission     Standing Status:   Standing     Number of Occurrences:   1     Order Specific Question:   Level of Care     Answer:   Med Surg [16]     Order Specific Question:   Estimated length of stay     Answer:   More than 2 Midnights     Order Specific Question:   Certification     Answer:   I certify that inpatient services are medically necessary for this patient for a duration of greater than two midnights  See H&P and MD Progress Notes for additional information about the patient's course of treatment  ED Arrival Information     Expected   -    Arrival   5/4/2023 04:14    Acuity   Emergent            Means of arrival   Ambulance    Escorted by   Abigail Red    Service   Hospitalist    Admission type   Emergency            Arrival complaint   hip injury           Chief Complaint   Patient presents with   • Fall     Presents via EMS from home s/p fall from bed  [place]  Confused at baseline  On ASA  Fentanyl and zofran given pta       Initial Presentation: 80 y o  female from home to ED via ems admitted to observation [address] due to ambulatory dysfunction in setting of right hip replacement 4/9/23  Presented due to right hip pain starting am of arrival when rolled out of bed  To floor  On asa  Per ems given Fentanyl   On exam:  Tenderness to palpation over right hip, well-healing incision noted over right hip with staple line intact  extremity is somewhat shortened and internally rotated still he neurovascularly intact    Bun 34, creatinine 1 51 is baseline     Wbc 12 06  Ct pelvis showed dislocation of right femur  Enlargement of right gluteal musculature  In the ED right hip reduced under sedation  Plan is pain control, PT/OT and consult Orthopedics  Start Diabetic diet and SSI with accuchecks  5/4/23 per Orthopedics:  Patient with recent right hemiarthroplasty and in ED right hip posterior dislocation reduced in ED  Plan is WBAT RLE, PT/OT, pain control  Surgical staples removed  Posterior hip precautions  5/5/23 CHANGED TO INPATIENT:   Has some pain in right hip  Feels stomach not well  On exam appears ill  Wbc 10 23  Bun 30, creatinine 1 51  Seen by PT and has decreased strength, endurance and mobility  Continue pain control  Referral to short term rehab  Date: 5/6/23   Day 2:  Pain right hip  On exam  Diminished breath sounds  Redness to sacrum, right hip and thigh  RLE limited ROM  Urine cloudy  Recommend  knee immobilizer while in bed to prevent hyperflexion and risk of recurrent dislocation  Pain control        ED Triage Vitals   Temperature Pulse Respirations Blood Pressure SpO2   05/04/23 0420 05/04/23 0420 05/04/23 0420 05/04/23 0420 05/04/23 0420   97 5 °F (36 4 °C) 72 18 (!) 197/95 94 %      Temp Source Heart Rate Source Patient Position - Orthostatic VS BP Location FiO2 (%)   05/04/23 0420 05/04/23 0420 05/04/23 0420 05/04/23 0420 --   Oral Monitor Lying Right arm       Pain Score       05/04/23 0700       6          Wt Readings from Last 1 Encounters:   05/04/23 57 4 kg (126 lb 8 7 oz)     Additional Vital Signs:   05/05/23 21:28:21 97 5 °F (36 4 °C) 66 16 147/64 92 94 % -- -- -- -- --   05/05/23 15:53:46 -- 66 -- 169/67 101 97 % -- -- -- -- --   05/05/23 15:48:16 96 8 °F (36 °C) Abnormal  66 22 182/78 Abnormal  113 96 % -- -- -- -- --   05/05/23 12:38:10 -- 73 -- 140/82 101 95 % -- -- -- -- --   05/05/23 07:38:06 96 8 °F (36 °C) Abnormal  61 17 174/64 Abnormal  101 96 % --         05/05/23 07:38:06 96 8 °F (36 °C) Abnormal  61 17 174/64 Abnormal  101 96 % -- -- -- -- --   05/04/23 21:23:34 97 2 °F (36 2 °C) Abnormal  88 -- 155/60 92 91 % -- -- -- None (Room air) Lying   05/04/23 19:46:16 97 3 °F (36 3 °C) Abnormal  74 -- 146/82 103 93 % -- -- -- None (Room air) Lying   05/04/23 1539 98 8 °F (37 1 °C) -- -- -- -- -- -- -- -- -- --   05/04/23 1513 99 1 °F (37 3 °C) 85 16 139/65 94 93 % -- -- -- None (Room air) Lying   05/04/23 0918 98 2 °F (36 8 °C) 55 18 140/62 80 95 % -- -- -- None (Room air) Lying   05/04/23 0800 -- 69 19 153/70 100 98 % -- -- -- None (Room air) Lying   05/04/23 07:32:59 -- 75 20 213/86 Abnormal  -- 94 % 28 -- 2 L/min Nasal cannula Lying   05/04/23 0700 -- 74 20 148/62 -- 94 % -- -- -- Nasal cannula Lying   05/04/23 0600 96 9 °F (36 1 °C) Abnormal  58 15 147/68 -- 99 % -- -- -- Nasal cannula --   05/04/23 0500 -- 66 17 166/72 -- 90 % -- -- -- None (Room air) --     Pertinent Labs/Diagnostic Test Results:   XR pelvis ap only 1 or 2 vw   Final Result by Kajal Johnson MD (05/04 1230)      Reduced right hip prosthetic dislocation      Workstation performed: [id number]         XR pelvis ap only 1 or 2 vw   Final Result by General Christian MD (05/04 1305)      Superiorly dislocated right hip prosthesis  No evidence for fracture  Workstation performed: [id number]         TRAUMA - CT spine cervical wo contrast   Final Result by Brad Fitzgerald MD (05/04 1035)      No cervical spine fracture or traumatic malalignment  Workstation performed: [id number]         CT pelvis wo contrast   Final Result by Brad Fitzgerald MD (05/04 8800)      Posterior and superior dislocation of the proximal right femur, including the patient's femoral and acetabular total hip arthroplasty hardware  No acute fracture  There is mild enlargement and edema of the right gluteal musculature when compared to the left likely post traumatic in nature        No free air or free fluid within the pelvis  Workstation performed: [id number]         TRAUMA - CT head wo contrast   Final Result by Brad Fitzgerald MD (05/04 6730)      No acute intracranial abnormality  Workstation performed: [id number]         XR Trauma chest portable   ED Interpretation by Miguel Kaminski DO (05/04 0911)   This study was ordered and independently reviewed by me    No acute findings noted           Final Result by Brad Fitzgerald MD (05/04 3703)      No acute cardiopulmonary disease                    Workstation performed: [id number]             Results from last 7 days   Lab Units 05/06/23  0900 05/05/23  0459 05/04/23  0425   WBC Thousand/uL 9 84 10 23* 12 06*   HEMOGLOBIN g/dL 10 9* 9 8* 12 3   HEMATOCRIT % 33 5* 30 4* 38 6   PLATELETS Thousands/uL 219 227 285   NEUTROS ABS Thousands/µL 6 47 6 61 7 28     Results from last 7 days   Lab Units 05/05/23  0459 05/04/23  0425   SODIUM mmol/L 141 140   POTASSIUM mmol/L 3 7 3 7   CHLORIDE mmol/L 109* 106   CO2 mmol/L 28 26   ANION GAP mmol/L 4 8   BUN mg/dL 30* 34*   CREATININE mg/dL 1 51* 1 51*   EGFR ml/min/1 73sq m 30 30   CALCIUM mg/dL 8 9 10 0     Results from last 7 days   Lab Units 05/06/23  1607 05/06/23  1109 05/06/23  0748 05/05/23  2102 05/05/23  1601 05/05/23  1124 05/05/23  0735 05/04/23  2123 05/04/23  1618 05/04/23  1359   POC GLUCOSE mg/dl 145* 107 117 107 91 133 97 101 155* 91     Results from last 7 days   Lab Units 05/05/23  0459 05/04/23  0425   GLUCOSE RANDOM mg/dL 103 136     Results from last 7 days   Lab Units 05/04/23  1036 05/04/23  0612 05/04/23  0425   HS TNI 0HR ng/L  --   --  12   HS TNI 2HR ng/L  --  10  --    HSTNI D2 ng/L  --  -2  --    HS TNI 4HR ng/L 10  --   --    HSTNI D4 ng/L -2  --   --      Results from last 7 days   Lab Units 05/04/23  1253   CLARITY UA  Clear   COLOR UA  Yellow   SPEC GRAV UA  1 015   PH UA  6 0   GLUCOSE UA mg/dl Negative   KETONES UA mg/dl Negative   BLOOD UA  Small*   PROTEIN UA mg/dl Trace* NITRITE UA  Negative   BILIRUBIN UA  Negative   UROBILINOGEN UA (BE) mg/dl <2 0   LEUKOCYTES UA  Large*   WBC UA /hpf 4-10*   RBC UA /hpf 0-1   BACTERIA UA /hpf Occasional   EPITHELIAL CELLS WET PREP /hpf Occasional       ED Treatment:   Medication Administration from 05/04/2023 0414 to 05/04/2023 3685       Date/Time Order Dose Route Action Comments     05/04/2023 0420 EDT fentanyl citrate (PF) (FOR EMS ONLY) 100 mcg/2 mL injection 100 mcg 0 mcg Does not apply Given to EMS --     05/04/2023 0420 EDT ondansetron (FOR EMS ONLY) (ZOFRAN) 4 mg/2 mL injection 4 mg 0 mg Does not apply Given to EMS --     05/04/2023 0548 EDT ondansetron (ZOFRAN) injection 4 mg 4 mg Intravenous Given --     05/04/2023 0730 EDT fentanyl citrate (PF) 100 MCG/2ML 50 mcg 25 mcg Intravenous Given --     05/04/2023 0737 EDT propofol (DIPRIVAN) 200 MG/20ML bolus injection 50 mg 50 mg Intravenous Given --     05/04/2023 0736 EDT propofol (DIPRIVAN) 200 MG/20ML bolus injection 50 mg 25 mg Intravenous Given --     05/04/2023 0730 EDT sodium chloride 0 9 % bolus 500 mL 500 mL Intravenous New Bag --        Past Medical History:   Diagnosis Date   • COPD (chronic obstructive pulmonary disease) ([place] [address] )    • Diabetes mellitus ([place] [address] )    • Disease of thyroid gland    • Hypertension      Present on Admission:  • CKD (chronic kidney disease) stage 3, GFR 30-59 ml/min ([facility])  • Type 2 diabetes mellitus, without long-term current use of insulin ([facility])  • Hypothyroidism  • COPD (chronic obstructive pulmonary disease) ([place] [address] )      Admitting Diagnosis: Acute respiratory insufficiency [R06 89]  Hip injury [S79 919A]  Nausea and vomiting [R11 2]  Fall, initial encounter [W19  XXXA]  Closed posterior dislocation of hip, initial encounter ([place] [address] ) [K64 208Z]  Age/Sex: 80 y o  female  Admission Orders:  Scheduled Medications:  aspirin, 81 mg, Oral, Daily  heparin (porcine), 5,000 Units, Subcutaneous, Q8H [facility] & snf  insulin lispro, 1-6 Units, Subcutaneous, TID AC  insulin lispro, 1-6 Units, Subcutaneous, HS  levothyroxine, 100 mcg, Oral, Daily  ondansetron, 4 mg, Intravenous, Once  umeclidinium, 1 puff, Inhalation, Daily    Continuous IV Infusions: none   No current facility-administered medications for this encounter  PRN Meds:  acetaminophen, 650 mg, Oral, Q6H PRN - x 1 5/4  levalbuterol, 1 25 mg, Nebulization, Q8H PRN x 1 5/4    PT/OT    IP CONSULT TO ORTHOPEDIC SURGERY    Network Utilization Review Department  ATTENTION: Please call with any questions or concerns to [phone] and carefully listen to the prompts so that you are directed to the right person  All voicemails are confidential   Heather Arnold all requests for admission clinical reviews, approved or denied determinations and any other requests to dedicated fax number below belonging to the campus where the patient is receiving treatment   List of dedicated fax numbers for the Facilities:  [address] DENIALS (Administrative/Medical Necessity) [phone]   [address] (Maternity/NICU/Pediatrics) [phone]    Annabelle Patel [phone]   [facility] [phone]   [address] [address] Marques Russ 28 [phone]   1557 [facility] Yates Bigler 134 815 [facility] [phone]

## 2023-05-09 NOTE — UTILIZATION REVIEW
NOTIFICATION OF ADMISSION DISCHARGE   This is a Notification of Discharge from [address]  Please be advised that this patient has been discharge from our facility  Below you will find the admission and discharge date and time including the patient’s disposition  UTILIZATION REVIEW CONTACT:  Anali Machado  Utilization   Network Utilization Review Department  Phone: [phone] carefully listen to the prompts  All voicemails are confidential   Email: [email or web address]     ADMISSION INFORMATION  PRESENTATION DATE: 5/4/2023  4:19 AM  OBERVATION ADMISSION DATE:   INPATIENT ADMISSION DATE: 5/5/23  3:50 PM   DISCHARGE DATE: 5/6/2023  5:03 PM   DISPOSITION:Non [facility] SNF/TCU/SNU    IMPORTANT INFORMATION:  Send all requests for admission clinical reviews, approved or denied determinations and any other requests to dedicated fax number below belonging to the campus where the patient is receiving treatment   List of dedicated fax numbers:  [address] DENIALS (Administrative/Medical Necessity) [phone]   [address] (Maternity/NICU/Pediatrics) [phone]   [facility] [phone]   [facility] 87 [phone]   [address] [phone]   220 [facility] [phone]   90 [facility] [phone]   [facility] 119 [phone]   [facility]  [phone]   4058 [facility] [phone]   412 [facility] 850 [facility] [phone]

## 2023-05-11 ENCOUNTER — NURSING HOME VISIT (OUTPATIENT)
Dept: FAMILY MEDICINE CLINIC | Facility: CLINIC | Age: 88
End: 2023-05-11

## 2023-05-11 DIAGNOSIS — R26.2 AMBULATORY DYSFUNCTION: ICD-10-CM

## 2023-05-11 DIAGNOSIS — Z96.641 HISTORY OF RIGHT HIP REPLACEMENT: ICD-10-CM

## 2023-05-11 DIAGNOSIS — I10 PRIMARY HYPERTENSION: ICD-10-CM

## 2023-05-11 DIAGNOSIS — N18.32 TYPE 2 DIABETES MELLITUS WITH STAGE 3B CHRONIC KIDNEY DISEASE, WITHOUT LONG-TERM CURRENT USE OF INSULIN (HCC): ICD-10-CM

## 2023-05-11 DIAGNOSIS — E11.22 TYPE 2 DIABETES MELLITUS WITH STAGE 3B CHRONIC KIDNEY DISEASE, WITHOUT LONG-TERM CURRENT USE OF INSULIN (HCC): ICD-10-CM

## 2023-05-11 DIAGNOSIS — E03.8 OTHER SPECIFIED HYPOTHYROIDISM: ICD-10-CM

## 2023-05-11 DIAGNOSIS — I50.32 CHRONIC HEART FAILURE WITH PRESERVED EJECTION FRACTION (HCC): Primary | ICD-10-CM

## 2023-05-11 DIAGNOSIS — J43.1 PANLOBULAR EMPHYSEMA (HCC): ICD-10-CM

## 2023-05-11 NOTE — PROGRESS NOTES
[address] [place] [address]  Facility: [facility]    NAME: Rubi Granado  AGE: 80 y o  SEX: female    DATE OF ENCOUNTER: 5/11/2023    Code status:  DNR w/ Hospitalization    Assessment and Plan     1  Chronic heart failure with preserved ejection fraction ([facility] [address] )    2  Panlobular emphysema ([facility] [address] )    3  History of right hip replacement    4  Type 2 diabetes mellitus with stage 3b chronic kidney disease, without long-term current use of insulin ([facility] [address] )    5  Other specified hypothyroidism    6  Primary hypertension    7  Ambulatory dysfunction        All medications and routine orders were reviewed and updated as needed  Plan discussed with: Patient, nursing staff    Chief Complaint     Interim evaluation    History of Present Illness     Viviane Angelucci is assessed for follow up  She is feeling well, gaining strength with therapy  TSH 0 49 and A1c 6 5  CBC/BMP stable on 5/8/23  Persistent ECHOLS, can not find pulse ox with ambulation results, ordered on 5/8/23  Nursing to follow up  Denies cough/congestion/wheeze  Denies pain  Moving her bowels and denies s/s dysuria  Sleeping well at night with melatonin in place  Adherent to wearing right immobilizer while in bed  Afebrile, HR 55-70  Bp 120-160/60s with amlodipine 2 5mg in place  Weight stable without s/s fluid overload  The following portions of the patient's history were reviewed and updated as appropriate: current medications, past family history, past medical history, past social history, past surgical history and problem list     Allergies: Allergies   Allergen Reactions   • Erythromycin Other (See Comments)     unknown   • Iodine - Food Allergy Hives   • Lidocaine Hives   • Metformin Diarrhea   • Penicillin V Hives     TOLERATED CEFTRIAXONE 12/2022   • Rosuvastatin Myalgia   • Sulfamethoxazole-Trimethoprim Hives       Review of Systems     Review of Systems   Constitutional: Positive for appetite change  Negative for activity change, chills, fatigue and fever  HENT: Positive for hearing loss  Negative for congestion, ear pain, postnasal drip and sinus pain  Eyes: Negative  Respiratory: Negative  Negative for cough, chest tightness and shortness of breath  Cardiovascular: Negative  Negative for chest pain and leg swelling  Gastrointestinal: Negative  Negative for constipation and diarrhea  Endocrine: Negative  Genitourinary: Negative  Negative for dysuria  Musculoskeletal: Positive for gait problem  Skin: Negative  Allergic/Immunologic: Negative  Negative for immunocompromised state  Neurological: Positive for weakness  Negative for dizziness and light-headedness  Hematological: Negative  Psychiatric/Behavioral: Positive for decreased concentration  Negative for sleep disturbance  Medications and orders     All medications reviewed and updated in Nursing Home EMR  Objective     Vitals: per nursing home records    Physical Exam  Vitals and nursing note reviewed  Constitutional:       General: She is awake  Appearance: She is ill-appearing  Comments: frail   HENT:      Head: Normocephalic and atraumatic  Right Ear: Tympanic membrane, ear canal and external ear normal       Left Ear: Tympanic membrane, ear canal and external ear normal       Nose: Nose normal       Mouth/Throat:      Mouth: Mucous membranes are moist       Pharynx: Oropharynx is clear  Eyes:      Extraocular Movements: Extraocular movements intact  Conjunctiva/sclera: Conjunctivae normal       Pupils: Pupils are equal, round, and reactive to light  Cardiovascular:      Rate and Rhythm: Normal rate and regular rhythm  Pulses: Normal pulses  Heart sounds: Murmur heard  Pulmonary:      Effort: Pulmonary effort is normal       Breath sounds: Normal breath sounds  Comments: diminished  Abdominal:      General: Bowel sounds are normal       Palpations: Abdomen is soft  Musculoskeletal:         General: Normal range of motion  Cervical back: Normal range of motion and neck supple  Skin:     General: Skin is warm and dry  Comments: PVD  Actinic keratosis    Neurological:      General: No focal deficit present  Mental Status: She is alert and oriented to person, place, and time  Motor: Weakness present  Gait: Gait abnormal    Psychiatric:         Mood and Affect: Mood normal          Speech: Speech normal          Behavior: Behavior is slowed  Behavior is cooperative  Thought Content: Thought content normal          Judgment: Judgment normal       Comments: forgetful         Pertinent Laboratory/Diagnostic Studies: The following labs and studies were reviewed please see chart or hospital paperwork for details  Decrease levothyroxine to 88mcg daily and recheck TSH in 8 weeks  Nursing to follow up on pulse ox with ambulation ordered on 5/8/23       ISABELA Martinez  5/11/2023 10:55 AM

## 2023-05-15 ENCOUNTER — NURSING HOME VISIT (OUTPATIENT)
Dept: FAMILY MEDICINE CLINIC | Facility: CLINIC | Age: 88
End: 2023-05-15

## 2023-05-15 DIAGNOSIS — R63.4 UNINTENTIONAL WEIGHT LOSS: ICD-10-CM

## 2023-05-15 DIAGNOSIS — I10 PRIMARY HYPERTENSION: ICD-10-CM

## 2023-05-15 DIAGNOSIS — N18.30 STAGE 3 CHRONIC KIDNEY DISEASE, UNSPECIFIED WHETHER STAGE 3A OR 3B CKD (HCC): ICD-10-CM

## 2023-05-15 DIAGNOSIS — E11.22 TYPE 2 DIABETES MELLITUS WITH STAGE 3B CHRONIC KIDNEY DISEASE, WITHOUT LONG-TERM CURRENT USE OF INSULIN (HCC): ICD-10-CM

## 2023-05-15 DIAGNOSIS — I50.32 CHRONIC HEART FAILURE WITH PRESERVED EJECTION FRACTION (HCC): Primary | ICD-10-CM

## 2023-05-15 DIAGNOSIS — N18.32 TYPE 2 DIABETES MELLITUS WITH STAGE 3B CHRONIC KIDNEY DISEASE, WITHOUT LONG-TERM CURRENT USE OF INSULIN (HCC): ICD-10-CM

## 2023-05-15 DIAGNOSIS — J43.1 PANLOBULAR EMPHYSEMA (HCC): ICD-10-CM

## 2023-05-15 DIAGNOSIS — Z96.641 HISTORY OF RIGHT HIP REPLACEMENT: ICD-10-CM

## 2023-05-15 NOTE — PROGRESS NOTES
[address]  Facility: [facility]    NAME: Ion Garland  AGE: 80 y o  SEX: female    DATE OF ENCOUNTER: 5/15/2023    Code status:  DNR w/ Hospitalization    Assessment and Plan     1  Chronic heart failure with preserved ejection fraction ([facility] )    2  Panlobular emphysema ([facility] )    3  History of right hip replacement    4  Stage 3 chronic kidney disease, unspecified whether stage 3a or 3b CKD ([facility] )    5  Primary hypertension    6  Type 2 diabetes mellitus with stage 3b chronic kidney disease, without long-term current use of insulin ([facility] )    7  Unintentional weight loss        All medications and routine orders were reviewed and updated as needed  Plan discussed with: Patient, nursing staff    Chief Complaint     Interim evaluation    History of Present Illness     Serenity Rankin is assessed for follow-up  She has lost over 10 pounds since admission unintentionally  Appetite remains erratic, dietary adjusted diet to liberalized as well as is providing supplementation twice daily  She is gaining strength through therapy  Persistent dyspnea with exertion however pulse ox with ambulation within normal limits  Denies chest pressure or pain  Is moving her bowels  Urinating sufficient amounts per nursing  Sleeping well at night with melatonin  Adherent to right lower extremity immobilizer while in bed  Afebrile, vitals are stable  Fasting blood sugar within normal limits  The following portions of the patient's history were reviewed and updated as appropriate: current medications, past family history, past medical history, past social history, past surgical history and problem list     Allergies:   Allergies   Allergen Reactions   • Erythromycin Other (See Comments)     unknown   • Iodine - Food Allergy Hives   • Lidocaine Hives   • Metformin Diarrhea   • Penicillin V Hives     TOLERATED CEFTRIAXONE 12/2022   • Rosuvastatin Myalgia   • Sulfamethoxazole-Trimethoprim Hives       Review of Systems     Review of Systems   Constitutional: Positive for activity change, appetite change and unexpected weight change  Negative for chills, fatigue and fever  HENT: Positive for hearing loss  Negative for congestion, ear pain, postnasal drip and sinus pain  Eyes: Negative  Respiratory: Negative  Negative for cough and shortness of breath  Cardiovascular: Negative  Negative for chest pain and leg swelling  Gastrointestinal: Negative  Negative for constipation and diarrhea  Endocrine: Negative  Genitourinary: Negative  Negative for dysuria  Musculoskeletal: Positive for gait problem  Skin: Negative  Allergic/Immunologic: Negative  Negative for immunocompromised state  Neurological: Positive for weakness  Negative for dizziness and light-headedness  Hematological: Negative  Psychiatric/Behavioral: Positive for decreased concentration  Negative for sleep disturbance  Medications and orders     All medications reviewed and updated in Nursing Home EMR  Objective     Vitals: per nursing home records    Physical Exam  Vitals and nursing note reviewed  Constitutional:       Appearance: Normal appearance  HENT:      Head: Normocephalic and atraumatic  Right Ear: Tympanic membrane, ear canal and external ear normal  Decreased hearing noted  Left Ear: Tympanic membrane, ear canal and external ear normal  Decreased hearing noted  Nose: Nose normal       Mouth/Throat:      Mouth: Mucous membranes are moist       Pharynx: Oropharynx is clear  Eyes:      Extraocular Movements: Extraocular movements intact  Conjunctiva/sclera: Conjunctivae normal       Pupils: Pupils are equal, round, and reactive to light  Cardiovascular:      Rate and Rhythm: Normal rate and regular rhythm  Pulses: Normal pulses  Heart sounds: Murmur heard     Pulmonary:      Effort: Pulmonary effort is normal       Breath sounds: Normal breath sounds  Comments: Diminished bases  Abdominal:      General: Bowel sounds are normal       Palpations: Abdomen is soft  Musculoskeletal:         General: Normal range of motion  Cervical back: Normal range of motion and neck supple  Skin:     General: Skin is warm and dry  Neurological:      General: No focal deficit present  Mental Status: She is alert  She is disoriented  Motor: Weakness present  Gait: Gait abnormal       Comments: Alert to self, time, unsure of place   Psychiatric:         Mood and Affect: Mood normal          Speech: Speech normal          Behavior: Behavior is slowed  Behavior is cooperative  Thought Content: Thought content normal          Cognition and Memory: Memory is impaired  Judgment: Judgment normal       Comments: forgetful         Pertinent Laboratory/Diagnostic Studies: The following labs and studies were reviewed please see chart or hospital paperwork for details      Continue to monitor weight closely    ISABELA Corbin  5/15/2023 11:07 AM

## 2023-05-18 ENCOUNTER — NURSING HOME VISIT (OUTPATIENT)
Dept: FAMILY MEDICINE CLINIC | Facility: CLINIC | Age: 88
End: 2023-05-18

## 2023-05-18 DIAGNOSIS — R53.1 GENERALIZED WEAKNESS: ICD-10-CM

## 2023-05-18 DIAGNOSIS — N18.32 STAGE 3B CHRONIC KIDNEY DISEASE (HCC): ICD-10-CM

## 2023-05-18 DIAGNOSIS — N18.32 TYPE 2 DIABETES MELLITUS WITH STAGE 3B CHRONIC KIDNEY DISEASE, WITHOUT LONG-TERM CURRENT USE OF INSULIN (HCC): ICD-10-CM

## 2023-05-18 DIAGNOSIS — E11.22 TYPE 2 DIABETES MELLITUS WITH STAGE 3B CHRONIC KIDNEY DISEASE, WITHOUT LONG-TERM CURRENT USE OF INSULIN (HCC): ICD-10-CM

## 2023-05-18 DIAGNOSIS — I50.32 CHRONIC HEART FAILURE WITH PRESERVED EJECTION FRACTION (HCC): ICD-10-CM

## 2023-05-18 DIAGNOSIS — Z96.641 HISTORY OF RIGHT HIP REPLACEMENT: Primary | ICD-10-CM

## 2023-05-18 DIAGNOSIS — J43.1 PANLOBULAR EMPHYSEMA (HCC): ICD-10-CM

## 2023-05-18 NOTE — PROGRESS NOTES
[address]  Facility: [facility]    NAME: Mary Adrian  AGE: 80 y o  SEX: female    DATE OF ENCOUNTER: 5/18/2023    Code status:  DNR w/ Hospitalization    Assessment and Plan     1  History of right hip replacement    2  Chronic heart failure with preserved ejection fraction ([facility])    3  Panlobular emphysema ([facility] )    4  Type 2 diabetes mellitus with stage 3b chronic kidney disease, without long-term current use of insulin ([facility])    5  Stage 3b chronic kidney disease ([facility] )    6  Generalized weakness        All medications and routine orders were reviewed and updated as needed  Plan discussed with: Patient, nursing staff    Chief Complaint     Interim evaluation    History of Present Illness     Martín Schaefer is assessed for follow-up  She has slow progression in therapy and notes limited activity tolerance, multifactorial due to chronic conditions  Denies dyspnea, cough, increased mucus production  O2 sats mid 90s  Appetite has improved, weight loss stalled  She is staying hydrated  Her bowels are moving and she is urinating without signs of dysuria  She is sleeping well at night with melatonin  Continues to wear immobilizer while in bed  Afebrile, heart rate 55-70 blood pressure 130-180 over 70s  Fasting blood sugar stable      The following portions of the patient's history were reviewed and updated as appropriate: current medications, past family history, past medical history, past social history, past surgical history and problem list     Allergies: Allergies   Allergen Reactions   • Erythromycin Other (See Comments)     unknown   • Iodine - Food Allergy Hives   • Lidocaine Hives   • Metformin Diarrhea   • Penicillin V Hives     TOLERATED CEFTRIAXONE 12/2022   • Rosuvastatin Myalgia   • Sulfamethoxazole-Trimethoprim Hives       Review of Systems     Review of Systems   Constitutional: Negative    Negative for activity change, appetite change, chills, fatigue and fever  HENT: Positive for hearing loss  Negative for congestion, ear pain, postnasal drip and sinus pain  Eyes: Negative  Respiratory: Negative  Negative for cough and shortness of breath  Cardiovascular: Negative  Negative for chest pain and leg swelling  Gastrointestinal: Negative  Negative for constipation and diarrhea  Endocrine: Negative  Genitourinary: Negative  Negative for dysuria  Musculoskeletal: Positive for gait problem  Skin: Negative  Allergic/Immunologic: Negative  Negative for immunocompromised state  Neurological: Positive for weakness  Negative for dizziness and light-headedness  Hematological: Negative  Psychiatric/Behavioral: Positive for decreased concentration  Medications and orders     All medications reviewed and updated in Nursing Home EMR  Objective     Vitals: per nursing home records    Physical Exam  Vitals and nursing note reviewed  Constitutional:       General: She is awake  Appearance: Normal appearance  Comments: frail   HENT:      Head: Normocephalic and atraumatic  Right Ear: Tympanic membrane, ear canal and external ear normal  Decreased hearing noted  Left Ear: Tympanic membrane, ear canal and external ear normal  Decreased hearing noted  Nose: Nose normal       Mouth/Throat:      Mouth: Mucous membranes are moist       Pharynx: Oropharynx is clear  Eyes:      Extraocular Movements: Extraocular movements intact  Conjunctiva/sclera: Conjunctivae normal       Pupils: Pupils are equal, round, and reactive to light  Cardiovascular:      Rate and Rhythm: Normal rate and regular rhythm  Pulses: Normal pulses  Heart sounds: Murmur heard  Pulmonary:      Effort: Pulmonary effort is normal       Breath sounds: Normal breath sounds  Comments: Diminished but clear  Abdominal:      General: Bowel sounds are normal       Palpations: Abdomen is soft  Musculoskeletal:         General: Normal range of motion  Cervical back: Normal range of motion and neck supple  Right lower leg: No edema  Left lower leg: No edema  Skin:     General: Skin is warm and dry  Neurological:      General: No focal deficit present  Mental Status: She is alert and oriented to person, place, and time  Motor: Weakness present  Gait: Gait abnormal    Psychiatric:         Mood and Affect: Mood normal          Speech: Speech normal          Behavior: Behavior is slowed  Behavior is cooperative  Thought Content: Thought content normal          Judgment: Judgment normal       Comments: forgeful         Pertinent Laboratory/Diagnostic Studies: The following labs and studies were reviewed please see chart or hospital paperwork for details  Increase Norvasc to 5mg po daily  Ortho follow-up scheduled 5/19/2023      ISABELA Burrows  5/18/2023 11:15 AM

## 2023-05-22 ENCOUNTER — NURSING HOME VISIT (OUTPATIENT)
Dept: FAMILY MEDICINE CLINIC | Facility: CLINIC | Age: 88
End: 2023-05-22

## 2023-05-22 DIAGNOSIS — N39.0 E. COLI UTI: ICD-10-CM

## 2023-05-22 DIAGNOSIS — R29.6 RECURRENT FALLS: ICD-10-CM

## 2023-05-22 DIAGNOSIS — E11.22 TYPE 2 DIABETES MELLITUS WITH STAGE 3B CHRONIC KIDNEY DISEASE, WITHOUT LONG-TERM CURRENT USE OF INSULIN (HCC): ICD-10-CM

## 2023-05-22 DIAGNOSIS — N18.32 TYPE 2 DIABETES MELLITUS WITH STAGE 3B CHRONIC KIDNEY DISEASE, WITHOUT LONG-TERM CURRENT USE OF INSULIN (HCC): ICD-10-CM

## 2023-05-22 DIAGNOSIS — I50.32 CHRONIC HEART FAILURE WITH PRESERVED EJECTION FRACTION (HCC): ICD-10-CM

## 2023-05-22 DIAGNOSIS — J43.1 PANLOBULAR EMPHYSEMA (HCC): ICD-10-CM

## 2023-05-22 DIAGNOSIS — B96.20 E. COLI UTI: ICD-10-CM

## 2023-05-22 DIAGNOSIS — Z96.641 HISTORY OF RIGHT HIP REPLACEMENT: Primary | ICD-10-CM

## 2023-05-22 DIAGNOSIS — N18.32 STAGE 3B CHRONIC KIDNEY DISEASE (HCC): ICD-10-CM

## 2023-05-22 NOTE — PROGRESS NOTES
[address]  Facility: [facility]    NAME: Luis F White  AGE: 80 y o  SEX: female    DATE OF ENCOUNTER: 5/22/2023    Code status:  DNR w/ Hospitalization    Assessment and Plan     1  History of right hip replacement    2  Chronic heart failure with preserved ejection fraction (HCC)    3  Panlobular emphysema (HCC)    4  E  coli UTI    5  Stage 3b chronic kidney disease ([facility] )    6  Type 2 diabetes mellitus with stage 3b chronic kidney disease, without long-term current use of insulin ([facility] )    7  Recurrent falls        All medications and routine orders were reviewed and updated as needed  Plan discussed with: Patient, nursing staff    Chief Complaint     Interim evaluation    History of Present Illness     Leeanne Drummond is assessed for follow up  Her ortho follow up was rescheduled for tomorrow due to increased fatigue and GI symptoms  STAT CBC/BMP revealed bacterial infection  CXR negative, UA + E coli  Pushing fluids  Today Leeanne Drummond reports persistent weakness but improved appetite and hydration  Denies pain, dyspnea,  Cough/congestion  Bowels are moving, increased urinary frequency as expected  Increased daytime somnolence  Afebrile, VSS  FBS stable  Yash Marcello 5/21 while transferring into bed, no injury noted, neuro checks WNL         The following portions of the patient's history were reviewed and updated as appropriate: current medications, past family history, past medical history, past social history, past surgical history and problem list     Allergies: Allergies   Allergen Reactions   • Erythromycin Other (See Comments)     unknown   • Iodine - Food Allergy Hives   • Lidocaine Hives   • Metformin Diarrhea   • Penicillin V Hives     TOLERATED CEFTRIAXONE 12/2022   • Rosuvastatin Myalgia   • Sulfamethoxazole-Trimethoprim Hives       Review of Systems     Review of Systems   Constitutional: Positive for fatigue   Negative for activity change, appetite change, chills and fever  HENT: Positive for hearing loss  Negative for congestion, ear pain, postnasal drip and sinus pain  Eyes: Negative  Respiratory: Negative  Negative for cough, chest tightness and shortness of breath  Cardiovascular: Negative  Negative for chest pain and leg swelling  Gastrointestinal: Negative  Negative for constipation and diarrhea  Endocrine: Negative  Genitourinary: Negative  Negative for dysuria  Musculoskeletal: Positive for gait problem  Skin: Negative  Allergic/Immunologic: Negative  Negative for immunocompromised state  Neurological: Positive for weakness  Negative for dizziness and light-headedness  Hematological: Negative  Psychiatric/Behavioral: Positive for decreased concentration and sleep disturbance  Medications and orders     All medications reviewed and updated in Nursing Home EMR  Objective     Vitals: per nursing home records    Physical Exam  Vitals and nursing note reviewed  Constitutional:       General: She is awake  Appearance: Normal appearance  Comments: frail   HENT:      Head: Normocephalic and atraumatic  Right Ear: Tympanic membrane, ear canal and external ear normal  Decreased hearing noted  Left Ear: Tympanic membrane, ear canal and external ear normal  Decreased hearing noted  Ears:      Comments: HA in place     Nose: Nose normal       Mouth/Throat:      Mouth: Mucous membranes are moist       Pharynx: Oropharynx is clear  Eyes:      Extraocular Movements: Extraocular movements intact  Conjunctiva/sclera: Conjunctivae normal       Pupils: Pupils are equal, round, and reactive to light  Cardiovascular:      Rate and Rhythm: Normal rate and regular rhythm  Pulses: Normal pulses  Heart sounds: Murmur heard  Pulmonary:      Effort: Pulmonary effort is normal       Breath sounds: Normal breath sounds     Abdominal:      General: Bowel sounds are normal  Palpations: Abdomen is soft  Musculoskeletal:         General: Normal range of motion  Cervical back: Normal range of motion and neck supple  Skin:     General: Skin is warm and dry  Neurological:      General: No focal deficit present  Mental Status: She is alert  She is disoriented  GCS: GCS eye subscore is 4  GCS verbal subscore is 4  GCS motor subscore is 6  Motor: Weakness present  Gait: Gait abnormal    Psychiatric:         Mood and Affect: Mood normal          Speech: Speech normal          Behavior: Behavior is slowed  Behavior is cooperative  Thought Content: Thought content normal          Judgment: Judgment is impulsive and inappropriate  Comments: forgetful         Pertinent Laboratory/Diagnostic Studies: The following labs and studies were reviewed please see chart or hospital paperwork for details  Keflex 500mg po q12hr x7 days  Await ortho follow up tomorrow      ISABELA Moralez  5/22/2023 11:23 AM

## 2023-05-25 ENCOUNTER — NURSING HOME VISIT (OUTPATIENT)
Dept: FAMILY MEDICINE CLINIC | Facility: CLINIC | Age: 88
End: 2023-05-25

## 2023-05-25 DIAGNOSIS — N39.0 E. COLI UTI: ICD-10-CM

## 2023-05-25 DIAGNOSIS — E03.8 OTHER SPECIFIED HYPOTHYROIDISM: ICD-10-CM

## 2023-05-25 DIAGNOSIS — R26.2 AMBULATORY DYSFUNCTION: ICD-10-CM

## 2023-05-25 DIAGNOSIS — E11.22 TYPE 2 DIABETES MELLITUS WITH STAGE 3B CHRONIC KIDNEY DISEASE, WITHOUT LONG-TERM CURRENT USE OF INSULIN (HCC): ICD-10-CM

## 2023-05-25 DIAGNOSIS — J43.1 PANLOBULAR EMPHYSEMA (HCC): ICD-10-CM

## 2023-05-25 DIAGNOSIS — N18.32 TYPE 2 DIABETES MELLITUS WITH STAGE 3B CHRONIC KIDNEY DISEASE, WITHOUT LONG-TERM CURRENT USE OF INSULIN (HCC): ICD-10-CM

## 2023-05-25 DIAGNOSIS — Z96.641 HISTORY OF RIGHT HIP REPLACEMENT: Primary | ICD-10-CM

## 2023-05-25 DIAGNOSIS — B96.20 E. COLI UTI: ICD-10-CM

## 2023-05-25 DIAGNOSIS — I50.32 CHRONIC HEART FAILURE WITH PRESERVED EJECTION FRACTION (HCC): ICD-10-CM

## 2023-05-25 DIAGNOSIS — I10 PRIMARY HYPERTENSION: ICD-10-CM

## 2023-05-25 NOTE — PROGRESS NOTES
[address]  Facility: [facility]    NAME: Ekaterina Aldana  AGE: 80 y o  SEX: female    DATE OF ENCOUNTER: 5/25/2023    Code status:  DNR w/ Hospitalization    Assessment and Plan     1  History of right hip replacement    2  Chronic heart failure with preserved ejection fraction (HCC)    3  Panlobular emphysema (HCC)    4  E  coli UTI    5  Primary hypertension    6  Type 2 diabetes mellitus with stage 3b chronic kidney disease, without long-term current use of insulin (HCC)    7  Other specified hypothyroidism    8  Ambulatory dysfunction        All medications and routine orders were reviewed and updated as needed  Plan discussed with: Patient, nursing staff    Chief Complaint     Interim evaluation    History of Present Illness     Yulissa Torres is assessed for follow-up  Her Ortho appointment went well, they are happy with her progress  She is tolerating Keflex for UTI without complication  She reports improved appetite, hydration, and less fatigue  She does have increased discomfort in right hip with therapy  She is only on scheduled Tylenol at this time  We will add lido patch  Bowels are moving  Sleeping well at night less daytime somnolence  Afebrile, vital stable as well as fasting blood sugar  The following portions of the patient's history were reviewed and updated as appropriate: current medications, past family history, past medical history, past social history, past surgical history and problem list     Allergies: Allergies   Allergen Reactions   • Erythromycin Other (See Comments)     unknown   • Iodine - Food Allergy Hives   • Lidocaine Hives   • Metformin Diarrhea   • Penicillin V Hives     TOLERATED CEFTRIAXONE 12/2022   • Rosuvastatin Myalgia   • Sulfamethoxazole-Trimethoprim Hives       Review of Systems     Review of Systems   Constitutional: Negative    Negative for activity change, appetite change, chills, fatigue and fever    HENT: Positive for hearing loss  Negative for congestion, ear pain, postnasal drip and sinus pain  Eyes: Negative  Respiratory: Negative  Negative for cough and shortness of breath  Cardiovascular: Negative  Negative for chest pain and leg swelling  Gastrointestinal: Negative  Negative for constipation and diarrhea  Endocrine: Negative  Genitourinary: Negative  Negative for dysuria  Musculoskeletal: Positive for arthralgias and gait problem  Skin: Negative  Allergic/Immunologic: Negative  Negative for immunocompromised state  Neurological: Positive for weakness  Negative for dizziness and light-headedness  Hematological: Negative  Psychiatric/Behavioral: Positive for decreased concentration  Negative for sleep disturbance  Medications and orders     All medications reviewed and updated in Nursing Home EMR  Objective     Vitals: per nursing home records    Physical Exam  Vitals and nursing note reviewed  Constitutional:       General: She is awake  Appearance: Normal appearance  Comments: frail   HENT:      Head: Normocephalic and atraumatic  Right Ear: Tympanic membrane, ear canal and external ear normal  Decreased hearing noted  Left Ear: Tympanic membrane, ear canal and external ear normal  Decreased hearing noted  Nose: Nose normal       Mouth/Throat:      Mouth: Mucous membranes are moist       Pharynx: Oropharynx is clear  Eyes:      Extraocular Movements: Extraocular movements intact  Conjunctiva/sclera: Conjunctivae normal       Pupils: Pupils are equal, round, and reactive to light  Cardiovascular:      Rate and Rhythm: Normal rate and regular rhythm  Pulses: Normal pulses  Heart sounds: Murmur heard  Pulmonary:      Effort: Pulmonary effort is normal       Breath sounds: Normal breath sounds  Abdominal:      General: Bowel sounds are normal       Palpations: Abdomen is soft     Musculoskeletal: General: Tenderness present  Normal range of motion  Cervical back: Normal range of motion and neck supple  Right lower leg: No edema  Left lower leg: No edema  Comments: Right hip tenderness with palpation   Skin:     General: Skin is warm and dry  Neurological:      General: No focal deficit present  Mental Status: She is alert  She is disoriented  Motor: Weakness present  Gait: Gait abnormal       Comments: forgetful   Psychiatric:         Mood and Affect: Mood normal          Speech: Speech normal          Behavior: Behavior is slowed  Behavior is cooperative  Judgment: Judgment is impulsive and inappropriate  Pertinent Laboratory/Diagnostic Studies: The following labs and studies were reviewed please see chart or hospital paperwork for details  Add lidopatch to right hip daily for pain control in addition to scheduled tylenol       Bearl Foots, CRNP  5/25/2023 10:55 AM

## 2023-05-30 NOTE — DISCHARGE SUMMARY
[facility]  [facility] 80 [facility]  female MRN: [id number]  Unit/Bed#: -[facility] Encounter: [id number]  Primary Care Provider: Khurram Demarco MD   Date and time admitted to hospital: 5/4/2023  4:19 AM    History of right hip replacement  Assessment & Plan  Right hip surgery done on 4/9 at [facility] hospital  Was due to remove staples at this time  Patient s/p fall at home and hip displacement  Was manually reduced in ER by orthopedics  Pain is controlled  PT/OT consulted  Recommended knee immobilizer while in bed to prevent hyperflexion and risk of recurrent dislocation  Patient is cleared by orthopedics and recommended outpatient follow-up with them in 10 to 14 days  Patient will be discharged to [facility] for skilled nursing rehab       COPD (chronic obstructive pulmonary disease) [facility])  Assessment & Plan  Stable    Hypothyroidism  Assessment & Plan  Continue Levothyroxine    Type 2 diabetes mellitus, without long-term current use of insulin [facility])  Assessment & Plan  No results found for: HGBA1C    Recent Labs     05/05/23  1601 05/05/23  2102 05/06/23  0748 05/06/23  1109   POCGLU 91 107 117 107       Blood Sugar Average: Last 72 hrs:  (P) 111     Relatively good control for patient's age  Will provide with diabetic diet and ISS  CKD (chronic kidney disease) stage 3, GFR 30-59 ml/min [facility])  Assessment & Plan  Lab Results   Component Value Date    EGFR 30 05/05/2023    EGFR 30 05/04/2023    EGFR 29 03/26/2023    CREATININE 1 51 (H) 05/05/2023    CREATININE 1 51 (H) 05/04/2023    CREATININE 1 57 (H) 03/26/2023   Baseline creatinine  Avoid nephrotoxic agents    * Ambulatory dysfunction  Assessment & Plan  Frail patient with recent hip replacement   Just got home from the nursing facility and now is here due to fall and hip displacement    S/p reduction in ED by orthopedic surgery  PT OT consult appreciated  Patient will be discharged to [facility] for skilled nursing rehab      Hospital Course:     Brenda Beth is a 80 y o  female patient who originally presented to the hospital on   Admission Orders (From admission, onward)     Ordered        05/05/23 1550  Inpatient Admission  Once            05/04/23 0803  Place in Observation  Once                     due to fall with hip displacement  Patient was admitted and had a manual successful hip reduction in ED  As per orthopedics they recommended outpatient follow-up in 10 to 14 days  Patient was seen by physical therapy and  and recommended rehab  Patient is hemodynamically stable and will be discharged to [facility] for skilled nursing rehab  On Exam-  Chest-bilateral air entry, clear to auscultation  Abdomen-soft, nontender  Heart-S1 S2 regular  Extremities-no pedal edema or calf tenderness  Neuro-alert awake oriented x3  No focal deficits    Please see above list of diagnoses and related plan for additional information  Follow-up with PCP in 1 week  Follow-up with orthopedics in 10 to 14 days as outpatient  Continue to wear knee immobilizer while in bed to prevent hyperflexion and risk of recurrent dislocation    Condition at Discharge:  good      Discharge instructions/Information to patient and family:   See after visit summary for information provided to patient and family  Provisions for Follow-Up Care:  See after visit summary for information related to follow-up care and any pertinent home health orders  Disposition:     Osiel Rutledge at [facility] rehab       Discharge Statement:  I spent 40 minutes discharging the patient  This time was spent on the day of discharge  I had direct contact with the patient on the day of discharge   Greater than 50% of the total time was spent examining patient, answering all patient questions, arranging and discussing plan of care with patient as well as directly providing post-discharge instructions  Additional time then spent on discharge activities  Discharge Medications:  See after visit summary for reconciled discharge medications provided to patient and family        ** Please Note: This note has been constructed using a voice recognition system ** 24

## 2023-06-01 ENCOUNTER — NURSING HOME VISIT (OUTPATIENT)
Dept: FAMILY MEDICINE CLINIC | Facility: CLINIC | Age: 88
End: 2023-06-01

## 2023-06-01 DIAGNOSIS — I50.32 CHRONIC HEART FAILURE WITH PRESERVED EJECTION FRACTION (HCC): ICD-10-CM

## 2023-06-01 DIAGNOSIS — E11.22 TYPE 2 DIABETES MELLITUS WITH STAGE 3B CHRONIC KIDNEY DISEASE, WITHOUT LONG-TERM CURRENT USE OF INSULIN (HCC): ICD-10-CM

## 2023-06-01 DIAGNOSIS — I10 PRIMARY HYPERTENSION: ICD-10-CM

## 2023-06-01 DIAGNOSIS — J43.1 PANLOBULAR EMPHYSEMA (HCC): ICD-10-CM

## 2023-06-01 DIAGNOSIS — N18.32 TYPE 2 DIABETES MELLITUS WITH STAGE 3B CHRONIC KIDNEY DISEASE, WITHOUT LONG-TERM CURRENT USE OF INSULIN (HCC): ICD-10-CM

## 2023-06-01 DIAGNOSIS — Z96.641 HISTORY OF RIGHT HIP REPLACEMENT: Primary | ICD-10-CM

## 2023-06-01 DIAGNOSIS — R29.6 RECURRENT FALLS: ICD-10-CM

## 2023-06-01 NOTE — PROGRESS NOTES
[address]  Facility: [facility]    NAME: Ashli Ragsdale  AGE: 80 y o  SEX: female    DATE OF ENCOUNTER: 6/1/2023    Code status:  DNR w/ Hospitalization    Assessment and Plan     1  History of right hip replacement    2  Chronic heart failure with preserved ejection fraction (HCC)    3  Panlobular emphysema ([facility] [address] )    4  Type 2 diabetes mellitus with stage 3b chronic kidney disease, without long-term current use of insulin ([facility] [address] )    5  Primary hypertension    6  Recurrent falls        All medications and routine orders were reviewed and updated as needed  Plan discussed with: Patient, nursing staff    Chief Complaint     Interim evaluation    History of Present Illness     Vin Fitzgerald is assessed for follow up  Her pain is well controlled  She denies dyspnea/chest pressure/pain  Appetite is good and she is staying well hydrated  Bowels are moving and there are no signs of dysuria  Sleeping well at night  She has fallen twice since being here for rehab, both occasions while attempting to self transfer  She does report she gets dizzy/lightheaded at times when changing positions  Family is deciding on whether its best to transition Vin Fitzgerald to LTC vs home  Afebrile, VSS  FBS and Weight WNL  The following portions of the patient's history were reviewed and updated as appropriate: current medications, past family history, past medical history, past social history, past surgical history and problem list     Allergies: Allergies   Allergen Reactions   • Erythromycin Other (See Comments)     unknown   • Iodine - Food Allergy Hives   • Lidocaine Hives   • Metformin Diarrhea   • Penicillin V Hives     TOLERATED CEFTRIAXONE 12/2022   • Rosuvastatin Myalgia   • Sulfamethoxazole-Trimethoprim Hives       Review of Systems     Review of Systems   Constitutional: Negative  Negative for activity change, appetite change, chills, fatigue and fever     HENT: Positive for hearing loss  Negative for congestion, ear pain, postnasal drip and sinus pain  Eyes: Negative  Respiratory: Negative  Negative for cough and shortness of breath  Cardiovascular: Negative  Negative for chest pain and leg swelling  Gastrointestinal: Negative  Negative for constipation and diarrhea  Endocrine: Negative  Genitourinary: Negative  Negative for dysuria  Musculoskeletal: Positive for arthralgias and gait problem  Skin: Negative  Allergic/Immunologic: Negative  Negative for immunocompromised state  Neurological: Positive for dizziness, weakness and light-headedness  Hematological: Negative  Psychiatric/Behavioral: Positive for decreased concentration  Negative for sleep disturbance  Medications and orders     All medications reviewed and updated in Nursing Home EMR  Objective     Vitals: per nursing home records    Physical Exam  Vitals and nursing note reviewed  Constitutional:       General: She is awake  Appearance: Normal appearance  Comments: frail   HENT:      Head: Normocephalic and atraumatic  Right Ear: Tympanic membrane, ear canal and external ear normal  Decreased hearing noted  Left Ear: Tympanic membrane, ear canal and external ear normal  Decreased hearing noted  Nose: Nose normal       Mouth/Throat:      Mouth: Mucous membranes are moist       Pharynx: Oropharynx is clear  Eyes:      Extraocular Movements: Extraocular movements intact  Conjunctiva/sclera: Conjunctivae normal       Pupils: Pupils are equal, round, and reactive to light  Cardiovascular:      Rate and Rhythm: Normal rate and regular rhythm  Pulses: Normal pulses  Heart sounds: Murmur heard  Pulmonary:      Effort: Pulmonary effort is normal       Breath sounds: Normal breath sounds  Abdominal:      General: Bowel sounds are normal       Palpations: Abdomen is soft  Musculoskeletal:         General: Tenderness present  Normal range of motion  Cervical back: Normal range of motion and neck supple  Skin:     General: Skin is warm and dry  Coloration: Skin is pale  Neurological:      General: No focal deficit present  Mental Status: She is alert and oriented to person, place, and time  Motor: Weakness present  Gait: Gait abnormal       Comments: forgetful   Psychiatric:         Mood and Affect: Mood normal          Behavior: Behavior is slowed  Behavior is cooperative  Thought Content: Thought content normal          Judgment: Judgment is impulsive and inappropriate  Pertinent Laboratory/Diagnostic Studies: The following labs and studies were reviewed please see chart or hospital paperwork for details      Ortho bp    ISABELA Cartagena  6/1/2023 11:07 AM

## 2023-06-05 ENCOUNTER — NURSING HOME VISIT (OUTPATIENT)
Dept: FAMILY MEDICINE CLINIC | Facility: CLINIC | Age: 88
End: 2023-06-05
Payer: COMMERCIAL

## 2023-06-05 DIAGNOSIS — N18.32 TYPE 2 DIABETES MELLITUS WITH STAGE 3B CHRONIC KIDNEY DISEASE, WITHOUT LONG-TERM CURRENT USE OF INSULIN (HCC): ICD-10-CM

## 2023-06-05 DIAGNOSIS — E03.8 OTHER SPECIFIED HYPOTHYROIDISM: ICD-10-CM

## 2023-06-05 DIAGNOSIS — I50.32 CHRONIC HEART FAILURE WITH PRESERVED EJECTION FRACTION (HCC): ICD-10-CM

## 2023-06-05 DIAGNOSIS — Z96.641 HISTORY OF RIGHT HIP REPLACEMENT: Primary | ICD-10-CM

## 2023-06-05 DIAGNOSIS — E11.22 TYPE 2 DIABETES MELLITUS WITH STAGE 3B CHRONIC KIDNEY DISEASE, WITHOUT LONG-TERM CURRENT USE OF INSULIN (HCC): ICD-10-CM

## 2023-06-05 DIAGNOSIS — J43.1 PANLOBULAR EMPHYSEMA (HCC): ICD-10-CM

## 2023-06-05 DIAGNOSIS — R26.2 AMBULATORY DYSFUNCTION: ICD-10-CM

## 2023-06-05 PROCEDURE — 99309 SBSQ NF CARE MODERATE MDM 30: CPT | Performed by: NURSE PRACTITIONER

## 2023-06-05 NOTE — PROGRESS NOTES
[address]  Facility: [facility]    NAME: Rosy Doyle  AGE: 80 y o  SEX: female    DATE OF ENCOUNTER: 6/5/2023    Code status:  DNR w/ Hospitalization    Assessment and Plan     1  History of right hip replacement    2  Chronic heart failure with preserved ejection fraction (HCC)    3  Panlobular emphysema ([facility] [address] )    4  Type 2 diabetes mellitus with stage 3b chronic kidney disease, without long-term current use of insulin ([facility] [address] )    5  Other specified hypothyroidism    6  Ambulatory dysfunction        All medications and routine orders were reviewed and updated as needed  Plan discussed with: Patient, nursing staff    Chief Complaint     Interim evaluation    History of Present Illness     Madyson Gonzalez is assessed for follow up  She is feeling well, denies pain,/dizziness/lightheadness today  Ortho bp not completed as ordered, nursing to follow up on this order  Her appetite is good and she is staying hydrated  Her bowels are moving, she is urinating sufficient amounts per nursing  She is sleeping well at night with melatonin on board  Decision whether Madyson Gonzalez is to transition to LTC vs home with family to be decided today  Afebrile, VSS  FBS and weight stable  The following portions of the patient's history were reviewed and updated as appropriate: current medications, past family history, past medical history, past social history, past surgical history and problem list     Allergies: Allergies   Allergen Reactions   • Erythromycin Other (See Comments)     unknown   • Iodine - Food Allergy Hives   • Lidocaine Hives   • Metformin Diarrhea   • Penicillin V Hives     TOLERATED CEFTRIAXONE 12/2022   • Rosuvastatin Myalgia   • Sulfamethoxazole-Trimethoprim Hives       Review of Systems     Review of Systems   Constitutional: Negative  Negative for activity change, appetite change, chills, fatigue and fever  HENT: Positive for hearing loss  Negative for congestion, ear pain, postnasal drip and sinus pain  Eyes: Negative  Respiratory: Negative  Negative for cough and shortness of breath  Cardiovascular: Negative  Negative for chest pain and leg swelling  Gastrointestinal: Negative  Negative for constipation and diarrhea  Endocrine: Negative  Genitourinary: Negative  Negative for dysuria  Musculoskeletal: Positive for gait problem  Skin: Negative  Allergic/Immunologic: Negative  Negative for immunocompromised state  Neurological: Positive for weakness  Negative for dizziness and light-headedness  Hematological: Negative  Psychiatric/Behavioral: Positive for decreased concentration  Negative for sleep disturbance  Medications and orders     All medications reviewed and updated in Nursing Home EMR  Objective     Vitals: per nursing home records    Physical Exam  Vitals and nursing note reviewed  Constitutional:       General: She is awake  Appearance: Normal appearance  Comments: frail   HENT:      Head: Normocephalic and atraumatic  Right Ear: Tympanic membrane, ear canal and external ear normal  Decreased hearing noted  Left Ear: Tympanic membrane, ear canal and external ear normal  Decreased hearing noted  Nose: Nose normal       Mouth/Throat:      Mouth: Mucous membranes are moist       Pharynx: Oropharynx is clear  Eyes:      Extraocular Movements: Extraocular movements intact  Conjunctiva/sclera: Conjunctivae normal       Pupils: Pupils are equal, round, and reactive to light  Cardiovascular:      Rate and Rhythm: Normal rate and regular rhythm  Pulses: Normal pulses  Heart sounds: Murmur heard  Pulmonary:      Effort: Pulmonary effort is normal       Breath sounds: Normal breath sounds  Abdominal:      General: Bowel sounds are normal       Palpations: Abdomen is soft  Musculoskeletal:         General: Normal range of motion        Cervical back: Normal range of motion and neck supple  Skin:     General: Skin is warm and dry  Coloration: Skin is pale  Neurological:      General: No focal deficit present  Mental Status: She is alert and oriented to person, place, and time  Motor: Weakness present  Gait: Gait abnormal    Psychiatric:         Mood and Affect: Mood normal          Speech: Speech normal          Behavior: Behavior is slowed  Behavior is cooperative  Thought Content: Thought content normal          Judgment: Judgment is impulsive  Comments: forgetful         Pertinent Laboratory/Diagnostic Studies: The following labs and studies were reviewed please see chart or hospital paperwork for details  CM to reach out to family regarding decision for Linda Rosen to transition to LTC       ISABELA Chavis  6/5/2023 10:37 AM

## 2023-06-08 ENCOUNTER — TELEPHONE (OUTPATIENT)
Dept: OBGYN CLINIC | Facility: HOSPITAL | Age: 88
End: 2023-06-08

## 2023-06-08 NOTE — TELEPHONE ENCOUNTER
Caller: Home     Doctor: N/A     Reason for call: Wanted to know if patient follows with us   I told them I did not see the was seen in our office

## 2023-06-12 ENCOUNTER — HOME HEALTH ADMISSION (OUTPATIENT)
Dept: HOME HEALTH SERVICES | Facility: HOME HEALTHCARE | Age: 88
End: 2023-06-12
Payer: COMMERCIAL

## 2023-06-14 ENCOUNTER — HOME CARE VISIT (OUTPATIENT)
Dept: HOME HEALTH SERVICES | Facility: HOME HEALTHCARE | Age: 88
End: 2023-06-14

## 2023-06-16 ENCOUNTER — HOME CARE VISIT (OUTPATIENT)
Dept: HOME HEALTH SERVICES | Facility: HOME HEALTHCARE | Age: 88
End: 2023-06-16
Payer: MEDICARE

## 2023-06-16 VITALS
RESPIRATION RATE: 20 BRPM | WEIGHT: 116 LBS | TEMPERATURE: 97.4 F | OXYGEN SATURATION: 96 % | DIASTOLIC BLOOD PRESSURE: 70 MMHG | SYSTOLIC BLOOD PRESSURE: 160 MMHG | HEIGHT: 62 IN | HEART RATE: 78 BPM | BODY MASS INDEX: 21.35 KG/M2

## 2023-06-16 PROCEDURE — G0299 HHS/HOSPICE OF RN EA 15 MIN: HCPCS

## 2023-06-16 PROCEDURE — 400013 VN SOC

## 2023-06-19 ENCOUNTER — HOME CARE VISIT (OUTPATIENT)
Dept: HOME HEALTH SERVICES | Facility: HOME HEALTHCARE | Age: 88
End: 2023-06-19
Payer: COMMERCIAL

## 2023-06-19 VITALS — OXYGEN SATURATION: 97 % | DIASTOLIC BLOOD PRESSURE: 70 MMHG | SYSTOLIC BLOOD PRESSURE: 140 MMHG | HEART RATE: 77 BPM

## 2023-06-19 PROCEDURE — G0151 HHCP-SERV OF PT,EA 15 MIN: HCPCS

## 2023-06-20 ENCOUNTER — HOME CARE VISIT (OUTPATIENT)
Dept: HOME HEALTH SERVICES | Facility: HOME HEALTHCARE | Age: 88
End: 2023-06-20
Payer: COMMERCIAL

## 2023-06-20 PROCEDURE — G0299 HHS/HOSPICE OF RN EA 15 MIN: HCPCS

## 2023-06-20 PROCEDURE — G0156 HHCP-SVS OF AIDE,EA 15 MIN: HCPCS

## 2023-06-20 PROCEDURE — G0180 MD CERTIFICATION HHA PATIENT: HCPCS | Performed by: FAMILY MEDICINE

## 2023-06-21 ENCOUNTER — HOME CARE VISIT (OUTPATIENT)
Dept: HOME HEALTH SERVICES | Facility: HOME HEALTHCARE | Age: 88
End: 2023-06-21
Payer: COMMERCIAL

## 2023-06-21 VITALS
TEMPERATURE: 97.5 F | SYSTOLIC BLOOD PRESSURE: 140 MMHG | HEART RATE: 72 BPM | RESPIRATION RATE: 18 BRPM | DIASTOLIC BLOOD PRESSURE: 76 MMHG | OXYGEN SATURATION: 97 %

## 2023-06-21 VITALS — HEART RATE: 78 BPM | OXYGEN SATURATION: 97 % | DIASTOLIC BLOOD PRESSURE: 68 MMHG | SYSTOLIC BLOOD PRESSURE: 130 MMHG

## 2023-06-21 PROCEDURE — G0152 HHCP-SERV OF OT,EA 15 MIN: HCPCS

## 2023-06-22 ENCOUNTER — HOME CARE VISIT (OUTPATIENT)
Dept: HOME HEALTH SERVICES | Facility: HOME HEALTHCARE | Age: 88
End: 2023-06-22
Payer: COMMERCIAL

## 2023-06-22 VITALS
RESPIRATION RATE: 18 BRPM | HEART RATE: 76 BPM | OXYGEN SATURATION: 96 % | DIASTOLIC BLOOD PRESSURE: 60 MMHG | SYSTOLIC BLOOD PRESSURE: 118 MMHG | TEMPERATURE: 97.6 F

## 2023-06-22 PROCEDURE — G0299 HHS/HOSPICE OF RN EA 15 MIN: HCPCS

## 2023-06-22 PROCEDURE — G0157 HHC PT ASSISTANT EA 15: HCPCS

## 2023-06-23 ENCOUNTER — HOME CARE VISIT (OUTPATIENT)
Dept: HOME HEALTH SERVICES | Facility: HOME HEALTHCARE | Age: 88
End: 2023-06-23
Payer: COMMERCIAL

## 2023-06-23 PROCEDURE — G0156 HHCP-SVS OF AIDE,EA 15 MIN: HCPCS

## 2023-06-26 ENCOUNTER — HOME CARE VISIT (OUTPATIENT)
Dept: HOME HEALTH SERVICES | Facility: HOME HEALTHCARE | Age: 88
End: 2023-06-26
Payer: COMMERCIAL

## 2023-06-26 VITALS
HEART RATE: 72 BPM | OXYGEN SATURATION: 96 % | RESPIRATION RATE: 18 BRPM | TEMPERATURE: 97.5 F | SYSTOLIC BLOOD PRESSURE: 140 MMHG | DIASTOLIC BLOOD PRESSURE: 70 MMHG

## 2023-06-26 PROCEDURE — G0299 HHS/HOSPICE OF RN EA 15 MIN: HCPCS

## 2023-06-26 PROCEDURE — 87493 C DIFF AMPLIFIED PROBE: CPT

## 2023-06-27 ENCOUNTER — HOME CARE VISIT (OUTPATIENT)
Dept: HOME HEALTH SERVICES | Facility: HOME HEALTHCARE | Age: 88
End: 2023-06-27
Payer: COMMERCIAL

## 2023-06-27 VITALS — OXYGEN SATURATION: 96 % | SYSTOLIC BLOOD PRESSURE: 148 MMHG | HEART RATE: 74 BPM | DIASTOLIC BLOOD PRESSURE: 70 MMHG

## 2023-06-27 PROCEDURE — G0156 HHCP-SVS OF AIDE,EA 15 MIN: HCPCS

## 2023-06-27 PROCEDURE — G0152 HHCP-SERV OF OT,EA 15 MIN: HCPCS

## 2023-06-28 ENCOUNTER — APPOINTMENT (OUTPATIENT)
Dept: LAB | Facility: HOSPITAL | Age: 88
End: 2023-06-28

## 2023-06-28 ENCOUNTER — HOME CARE VISIT (OUTPATIENT)
Dept: HOME HEALTH SERVICES | Facility: HOME HEALTHCARE | Age: 88
End: 2023-06-28
Payer: COMMERCIAL

## 2023-06-28 DIAGNOSIS — K52.9 INFLAMMATORY BOWEL DISEASE: ICD-10-CM

## 2023-06-28 PROCEDURE — G0155 HHCP-SVS OF CSW,EA 15 MIN: HCPCS

## 2023-06-29 ENCOUNTER — HOME CARE VISIT (OUTPATIENT)
Dept: HOME HEALTH SERVICES | Facility: HOME HEALTHCARE | Age: 88
End: 2023-06-29
Payer: COMMERCIAL

## 2023-06-29 ENCOUNTER — TELEPHONE (OUTPATIENT)
Dept: HOME HEALTH SERVICES | Facility: HOME HEALTHCARE | Age: 88
End: 2023-06-29

## 2023-06-29 VITALS — DIASTOLIC BLOOD PRESSURE: 60 MMHG | HEART RATE: 79 BPM | SYSTOLIC BLOOD PRESSURE: 148 MMHG | OXYGEN SATURATION: 96 %

## 2023-06-29 VITALS — OXYGEN SATURATION: 99 % | HEART RATE: 70 BPM | SYSTOLIC BLOOD PRESSURE: 136 MMHG | DIASTOLIC BLOOD PRESSURE: 70 MMHG

## 2023-06-29 LAB — C DIFF TOX GENS STL QL NAA+PROBE: NEGATIVE

## 2023-06-29 PROCEDURE — G0152 HHCP-SERV OF OT,EA 15 MIN: HCPCS

## 2023-06-29 PROCEDURE — G0151 HHCP-SERV OF PT,EA 15 MIN: HCPCS

## 2023-06-30 ENCOUNTER — HOSPITAL ENCOUNTER (EMERGENCY)
Facility: HOSPITAL | Age: 88
Discharge: HOME | End: 2023-06-30
Attending: EMERGENCY MEDICINE
Payer: COMMERCIAL

## 2023-06-30 ENCOUNTER — APPOINTMENT (EMERGENCY)
Dept: CT IMAGING | Facility: HOSPITAL | Age: 88
End: 2023-06-30
Payer: COMMERCIAL

## 2023-06-30 ENCOUNTER — HOME CARE VISIT (OUTPATIENT)
Dept: HOME HEALTH SERVICES | Facility: HOME HEALTHCARE | Age: 88
End: 2023-06-30
Payer: COMMERCIAL

## 2023-06-30 ENCOUNTER — APPOINTMENT (EMERGENCY)
Dept: RADIOLOGY | Facility: HOSPITAL | Age: 88
End: 2023-06-30
Payer: COMMERCIAL

## 2023-06-30 VITALS
SYSTOLIC BLOOD PRESSURE: 124 MMHG | BODY MASS INDEX: 25.89 KG/M2 | OXYGEN SATURATION: 98 % | TEMPERATURE: 98.1 F | HEART RATE: 72 BPM | DIASTOLIC BLOOD PRESSURE: 65 MMHG | RESPIRATION RATE: 17 BRPM | WEIGHT: 141.54 LBS

## 2023-06-30 DIAGNOSIS — R11.0 NAUSEA: ICD-10-CM
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
## 2023-06-30 DIAGNOSIS — M25.511 RIGHT SHOULDER PAIN: ICD-10-CM

## 2023-06-30 DIAGNOSIS — R42 POSTURAL DIZZINESS: Primary | ICD-10-CM

## 2023-06-30 LAB
ALBUMIN SERPL BCP-MCNC: 3.6 G/DL (ref 3.5–5)
ALP SERPL-CCNC: 85 U/L (ref 34–104)
ALT SERPL W P-5'-P-CCNC: 8 U/L (ref 7–52)
ANION GAP SERPL CALCULATED.3IONS-SCNC: 6 MMOL/L
AST SERPL W P-5'-P-CCNC: 10 U/L (ref 13–39)
ATRIAL RATE: 77 BPM
BASOPHILS # BLD AUTO: 0.04 THOUSANDS/ÂΜL (ref 0–0.1)
BASOPHILS NFR BLD AUTO: 0 % (ref 0–1)
BILIRUB SERPL-MCNC: 0.53 MG/DL (ref 0.2–1)
BUN SERPL-MCNC: 34 MG/DL (ref 5–25)
CALCIUM SERPL-MCNC: 9.4 MG/DL (ref 8.4–10.2)
CHLORIDE SERPL-SCNC: 102 MMOL/L (ref 96–108)
CO2 SERPL-SCNC: 30 MMOL/L (ref 21–32)
CREAT SERPL-MCNC: 1.44 MG/DL (ref 0.6–1.3)
EOSINOPHIL # BLD AUTO: 0.16 THOUSAND/ÂΜL (ref 0–0.61)
EOSINOPHIL NFR BLD AUTO: 1 % (ref 0–6)
ERYTHROCYTE [DISTWIDTH] IN BLOOD BY AUTOMATED COUNT: 14.9 % (ref 11.6–15.1)
GFR SERPL CREATININE-BSD FRML MDRD: 32 ML/MIN/1.73SQ M
GLUCOSE SERPL-MCNC: 181 MG/DL (ref 65–140)
HCT VFR BLD AUTO: 33.2 % (ref 34.8–46.1)
HGB BLD-MCNC: 10.6 G/DL (ref 11.5–15.4)
IMM GRANULOCYTES # BLD AUTO: 0.07 THOUSAND/UL (ref 0–0.2)
IMM GRANULOCYTES NFR BLD AUTO: 1 % (ref 0–2)
LYMPHOCYTES # BLD AUTO: 2.42 THOUSANDS/ÂΜL (ref 0.6–4.47)
LYMPHOCYTES NFR BLD AUTO: 21 % (ref 14–44)
MCH RBC QN AUTO: 30 PG (ref 26.8–34.3)
MCHC RBC AUTO-ENTMCNC: 31.9 G/DL (ref 31.4–37.4)
MCV RBC AUTO: 94 FL (ref 82–98)
MONOCYTES # BLD AUTO: 0.73 THOUSAND/ÂΜL (ref 0.17–1.22)
MONOCYTES NFR BLD AUTO: 6 % (ref 4–12)
NEUTROPHILS # BLD AUTO: 7.99 THOUSANDS/ÂΜL (ref 1.85–7.62)
NEUTS SEG NFR BLD AUTO: 71 % (ref 43–75)
NRBC BLD AUTO-RTO: 0 /100 WBCS
P AXIS: 67 DEGREES
PLATELET # BLD AUTO: 301 THOUSANDS/UL (ref 149–390)
PMV BLD AUTO: 9.4 FL (ref 8.9–12.7)
POTASSIUM SERPL-SCNC: 4.1 MMOL/L (ref 3.5–5.3)
PR INTERVAL: 138 MS
PROT SERPL-MCNC: 7.5 G/DL (ref 6.4–8.4)
QRS AXIS: 70 DEGREES
QRSD INTERVAL: 68 MS
QT INTERVAL: 420 MS
QTC INTERVAL: 475 MS
RBC # BLD AUTO: 3.53 MILLION/UL (ref 3.81–5.12)
SODIUM SERPL-SCNC: 138 MMOL/L (ref 135–147)
T WAVE AXIS: 66 DEGREES
VENTRICULAR RATE: 77 BPM
WBC # BLD AUTO: 11.41 THOUSAND/UL (ref 4.31–10.16)

## 2023-06-30 PROCEDURE — 73030 X-RAY EXAM OF SHOULDER: CPT

## 2023-06-30 PROCEDURE — 99285 EMERGENCY DEPT VISIT HI MDM: CPT

## 2023-06-30 PROCEDURE — 72125 CT NECK SPINE W/O DYE: CPT

## 2023-06-30 PROCEDURE — 97535 SELF CARE MNGMENT TRAINING: CPT

## 2023-06-30 PROCEDURE — 71045 X-RAY EXAM CHEST 1 VIEW: CPT

## 2023-06-30 PROCEDURE — 97163 PT EVAL HIGH COMPLEX 45 MIN: CPT

## 2023-06-30 PROCEDURE — 99285 EMERGENCY DEPT VISIT HI MDM: CPT | Performed by: EMERGENCY MEDICINE

## 2023-06-30 PROCEDURE — 85025 COMPLETE CBC W/AUTO DIFF WBC: CPT | Performed by: EMERGENCY MEDICINE

## 2023-06-30 PROCEDURE — 36415 COLL VENOUS BLD VENIPUNCTURE: CPT | Performed by: EMERGENCY MEDICINE

## 2023-06-30 PROCEDURE — 93010 ELECTROCARDIOGRAM REPORT: CPT | Performed by: INTERNAL MEDICINE

## 2023-06-30 PROCEDURE — 93005 ELECTROCARDIOGRAM TRACING: CPT

## 2023-06-30 PROCEDURE — 70450 CT HEAD/BRAIN W/O DYE: CPT

## 2023-06-30 PROCEDURE — 80053 COMPREHEN METABOLIC PANEL: CPT | Performed by: EMERGENCY MEDICINE

## 2023-06-30 PROCEDURE — 96374 THER/PROPH/DIAG INJ IV PUSH: CPT

## 2023-06-30 PROCEDURE — 97167 OT EVAL HIGH COMPLEX 60 MIN: CPT

## 2023-06-30 PROCEDURE — 96361 HYDRATE IV INFUSION ADD-ON: CPT

## 2023-06-30 PROCEDURE — 72170 X-RAY EXAM OF PELVIS: CPT

## 2023-06-30 RX ORDER — ONDANSETRON 2 MG/ML
4 INJECTION INTRAMUSCULAR; INTRAVENOUS ONCE
Status: COMPLETED | OUTPATIENT
Start: 2023-06-30 | End: 2023-06-30

## 2023-06-30 RX ORDER — ONDANSETRON 4 MG/1
4 TABLET, ORALLY DISINTEGRATING ORAL EVERY 8 HOURS PRN
Qty: 12 TABLET | Refills: 0 | Status: SHIPPED | OUTPATIENT
Start: 2023-06-30 | End: 2023-06-30 | Stop reason: SDUPTHER

## 2023-06-30 RX ORDER — ONDANSETRON 4 MG/1
4 TABLET, ORALLY DISINTEGRATING ORAL EVERY 8 HOURS PRN
Qty: 12 TABLET | Refills: 0 | Status: SHIPPED | OUTPATIENT
Start: 2023-06-30

## 2023-06-30 RX ADMIN — ONDANSETRON 4 MG: 2 INJECTION INTRAMUSCULAR; INTRAVENOUS at 11:36

## 2023-06-30 RX ADMIN — SODIUM CHLORIDE 1000 ML: 0.9 INJECTION, SOLUTION INTRAVENOUS at 11:37

## 2023-06-30 NOTE — PLAN OF CARE
Problem: OCCUPATIONAL THERAPY ADULT  Goal: Performs self-care activities at highest level of function for planned discharge setting. See evaluation for individualized goals. Description: Treatment Interventions: ADL retraining, Functional transfer training, Patient/family training, Compensatory technique education, Cognitive reorientation  Equipment Recommended:  (Discussed with son to utilize chair alarm to assist with fall prevention. Son endorsed that family currently owns.)       See flowsheet documentation for full assessment, interventions and recommendations. Note: Limitation: Decreased ADL status, Decreased self-care trans, Decreased high-level ADLs, Decreased cognition, Decreased Safe judgement during ADL     Assessment: Pt is a 80 y.o. female seen for OT evaluation at [facility], admitted 6/30/2023 s/p fall. Comorbidities affecting pt's functional performance at time of assessment include: generalized weakness, COPD, type II DM, chronic HF, recurrent falls, etc (see chart). Personal factors affecting pt at time of IE include:limited insight into deficits and decreased functional mobiity. Prior to admission, pt was living with family with 1st floor set-up. Pt primarily requiring assist with ADLs, however able to perform independently at times. Pt also uses a RW at baseline. Upon evaluation: Pt requires min A x 1 for bed mobility, min A x 1 for functional mobility/transfers, sup-min A for UB ADLs and min A for LB ADLS 2* the following deficits impacting occupational performance: decreased strength, decreased balance, decreased tolerance, impaired memory and impaired problem solving. Full objective findings from OT assessment regarding body systems outlined above. These impairments risk for falls  limit pt's ability to safely engage in all baseline areas of occupation and mobility.  Pt to benefit from continued skilled OT tx while in the hospital to address deficits as defined above and maximize level of functional independence w ADL's and functional mobility. Occupational Performance areas to address include: bathing/shower, toilet hygiene, dressing and functional mobility. This evaluation required an extensive review of medical and/or therapy records and additional review of physical, cognitive and psychosocial history related to functional performance. Based upon functional performance deficits and assessments, this evaluation has been identified as a high complexity evaluation. The patient's raw score on the -PAC Daily Activity inpatient short form is 20, standardized score is 42.03, greater than 39.4. Patients at this level are likely to benefit from DC to home. However please refer to therapist recommendation for discharge planning given other factors that may influence destination. At this time, OT recommendations at time of discharge are level 3 resources.

## 2023-06-30 NOTE — ED PROVIDER NOTES
Emergency Department Trauma Note  Edi Guerrero 80 y.o. female MRN: [id number]  Unit/Bed#: ED 07/ED 07 Encounter: [id number]      Trauma Alert: Trauma Acuity: Trauma Evaluation  Model of Arrival: Mode of Arrival: BLS via Trauma Squad Name and Number: [id number]  Trauma Team: Current Providers  Attending Provider: Cyndee Rosa MD  Physical Therapist: Estela Viera, PT  Registered Nurse: Orlin Zuleta RN  Consultants:     None      History of Present Illness     Chief Complaint:   Chief Complaint   Patient presents with   • Fall     Pt  to er via ems from home with reports of unwitnessed fall. Denies loc, complaints of head strike and right shoulder pain      HPI:  Edi Guerrero is a 80 y.o. female who presents with unwitnessed fall and dizziness. Mechanism:Details of Incident: pt to er after unwitnessed fall. hx of dimentia, complaints of head strike, and right shoulder pain. denies loc. on thinners Injury Date: 06/30/23 Injury Time: 200      80year old female presents for evaluation after unwitnessed fall. Patient's only complaints are right shoulder pain and nausea. Per EMS report, there was no LOC; however, patient had complained of dizziness. She became nauseated during transport which improved slightly after smelling an alcohol prep pad. Patient takes aspirin daily. Her son states she has had issues with postural dizziness for which he gave her meclizine at 1 am.          Review of Systems    Historical Information     Immunizations:   Immunization History   Administered Date(s) Administered   • COVID-19 PFIZER VACCINE 0.3 ML IM 03/12/2021, 04/02/2021, 10/21/2021   • COVID-19 Pfizer vac (Messi-sucrose, gray cap) 12 yr+ IM 04/15/2022, 10/12/2022       Past Medical History:   Diagnosis Date   • COPD (chronic obstructive pulmonary disease) ([address])    • Diabetes mellitus ([address])    • Disease of thyroid gland    • Hypertension      History reviewed. No pertinent family history. History reviewed.  No pertinent surgical history. Social History     Tobacco Use   • Smoking status: Former     Types: Cigarettes     Quit date: 2022     Years since quittin.5   Vaping Use   • Vaping Use: Never used   Substance Use Topics   • Alcohol use: Never   • Drug use: Never     E-Cigarette/Vaping   • E-Cigarette Use Never User      E-Cigarette/Vaping Substances       Family History: non-contributory    Meds/Allergies   Prior to Admission Medications   Prescriptions Last Dose Informant Patient Reported? Taking?   acetaminophen (TYLENOL) 500 mg tablet   Yes No   Sig: Take 500 mg by mouth every 6 (six) hours as needed for mild pain (unsure dosage)   amLODIPine (NORVASC) 2.5 mg tablet   Yes No   Sig: Take 2.5 mg by mouth daily. Take with 5mg for total dose of 7.5mg  Indications: High Blood Pressure Disorder   amLODIPine (NORVASC) 5 mg tablet   Yes No   Sig: Take 5 mg by mouth daily.  Take with 2.5mg for total dose of 7.5mg daily   Indications: High Blood Pressure Disorder   aspirin (ECOTRIN LOW STRENGTH) 81 mg EC tablet   Yes No   Sig: Take 81 mg by mouth   levothyroxine 100 mcg tablet   Yes No   Sig: Take 100 mcg by mouth daily   sitaGLIPtin (JANUVIA) 25 mg tablet   Yes No   tiotropium (Spiriva Respimat) 2.5 MCG/ACT AERS inhaler   Yes No   Sig: Inhale 2 puffs daily      Facility-Administered Medications: None       Allergies   Allergen Reactions   • Erythromycin Other (See Comments)     unknown   • Iodine - Food Allergy Hives   • Lidocaine Hives   • Metformin Diarrhea   • Penicillin V Hives     TOLERATED CEFTRIAXONE 2022   • Rosuvastatin Myalgia   • Sulfamethoxazole-Trimethoprim Hives       PHYSICAL EXAM    PE limited by: none    Objective   Vitals:   First set: Temperature: 98.1 °F (36.7 °C) (23 1051)  Pulse: 81 (23 1048)  Respirations: 18 (23 1048)  Blood Pressure: (!) 176/77 (23 1048)  SpO2: 98 % (23 1048)    Primary Survey:   (A) Airway: patent  (B) Breathing: bilateral breath sounds  (C) Circulation: Pulses:   normal  (D) Disabliity:  GCS Total:  14, Eye Opening:   Spontaneous = 4, Motor Response: Obeys commands = 6 and Verbal Response:  Confused = 4  (E) Expose:  Completed    Secondary Survey: (Click on Physical Exam tab above)  Physical Exam  Vitals and nursing note reviewed. HENT:      Head: Normocephalic. Right Ear: External ear normal.      Left Ear: External ear normal.      Mouth/Throat:      Mouth: Mucous membranes are moist.   Eyes:      Conjunctiva/sclera: Conjunctivae normal.      Pupils: Pupils are equal, round, and reactive to light. Cardiovascular:      Rate and Rhythm: Normal rate and regular rhythm. Pulses: Normal pulses. Heart sounds: Normal heart sounds. Pulmonary:      Effort: Pulmonary effort is normal. No respiratory distress. Breath sounds: Normal breath sounds. Abdominal:      General: There is no distension. Palpations: Abdomen is soft. Tenderness: There is no abdominal tenderness. Musculoskeletal:         General: No deformity. Right lower leg: No edema. Left lower leg: No edema. Comments: No midline C/T/L spine tenderness. No step offs or deformities. No pelvic tenderness or instability. Tenderness right shoulder. Normal ROM throughout. Skin:     General: Skin is warm and dry. Neurological:      Mental Status: She is alert. Comments: Slight confusion. Oriented to person, place and year, but not month or day. Cervical spine cleared by clinical criteria?  No (imaging required)      Invasive Devices     Peripheral Intravenous Line  Duration           Peripheral IV 05/04/23 Distal;Left;Upper;Ventral (anterior) Arm 57 days    Peripheral IV 06/30/23 Right Antecubital <1 day          Drain  Duration           External Urinary Catheter 57 days                Lab Results:   Results Reviewed     Procedure Component Value Units Date/Time    Comprehensive metabolic panel [[id number]]  (Abnormal) Collected: 06/30/23 1057    Lab Status: Final result Specimen: Blood from Arm, Right Updated: 06/30/23 1139     Sodium 138 mmol/L      Potassium 4.1 mmol/L      Chloride 102 mmol/L      CO2 30 mmol/L      ANION GAP 6 mmol/L      BUN 34 mg/dL      Creatinine 1.44 mg/dL      Glucose 181 mg/dL      Calcium 9.4 mg/dL      AST 10 U/L      ALT 8 U/L      Alkaline Phosphatase 85 U/L      Total Protein 7.5 g/dL      Albumin 3.6 g/dL      Total Bilirubin 0.53 mg/dL      eGFR 32 ml/min/1.73sq m     Narrative:      National Kidney Disease Foundation guidelines for Chronic Kidney Disease (CKD):   •  Stage 1 with normal or high GFR (GFR > 90 mL/min/1.73 square meters)  •  Stage 2 Mild CKD (GFR = 60-89 mL/min/1.73 square meters)  •  Stage 3A Moderate CKD (GFR = 45-59 mL/min/1.73 square meters)  •  Stage 3B Moderate CKD (GFR = 30-44 mL/min/1.73 square meters)  •  Stage 4 Severe CKD (GFR = 15-29 mL/min/1.73 square meters)  •  Stage 5 End Stage CKD (GFR <15 mL/min/1.73 square meters)  Note: GFR calculation is accurate only with a steady state creatinine    CBC and differential [[id number]]  (Abnormal) Collected: 06/30/23 1057    Lab Status: Final result Specimen: Blood from Arm, Right Updated: 06/30/23 1107     WBC 11.41 Thousand/uL      RBC 3.53 Million/uL      Hemoglobin 10.6 g/dL      Hematocrit 33.2 %      MCV 94 fL      MCH 30.0 pg      MCHC 31.9 g/dL      RDW 14.9 %      MPV 9.4 fL      Platelets 876 Thousands/uL      nRBC 0 /100 WBCs      Neutrophils Relative 71 %      Immat GRANS % 1 %      Lymphocytes Relative 21 %      Monocytes Relative 6 %      Eosinophils Relative 1 %      Basophils Relative 0 %      Neutrophils Absolute 7.99 Thousands/µL      Immature Grans Absolute 0.07 Thousand/uL      Lymphocytes Absolute 2.42 Thousands/µL      Monocytes Absolute 0.73 Thousand/µL      Eosinophils Absolute 0.16 Thousand/µL      Basophils Absolute 0.04 Thousands/µL                  Imaging Studies:   Direct to CT: No  XR Trauma chest portable   Final Result by Santhosh Eldridge MD (06/30 1123)      No acute cardiopulmonary disease. Workstation performed: [id number]         XR Trauma pelvis ap only 1 or 2 vw   Final Result by Santhosh Eldridge MD (06/30 1124)      No acute osseous abnormality. Workstation performed: [id number]         XR shoulder 2+ views RIGHT   ED Interpretation by Miguelangel Thomas MD (06/30 1139)   No acute fractures or dislocations      Final Result by Krishan Sotelo MD (06/30 1224)      No acute osseous abnormality. Workstation performed: [id number]         TRAUMA - CT head wo contrast   Final Result by Camille Yu MD (06/30 1127)      No acute intracranial abnormality. Stable chronic microangiopathic changes within the brain. The study was marked in [facility] for immediate notification. Workstation performed: [id number]         TRAUMA - CT spine cervical wo contrast   Final Result by Camille Yu MD (06/30 1128)      No acute cervical spine fracture or traumatic malalignment. The study was marked in [facility] for immediate notification.       Workstation performed: [id number]               Procedures  ECG 12 Lead Documentation Only    Date/Time: 6/30/2023 10:54 AM    Performed by: Miguelangel Thomas MD  Authorized by: Miguelangel Thomas MD    Indications / Diagnosis:  Dizziness  ECG reviewed by me, the ED Provider: yes    Patient location:  ED  Previous ECG:     Previous ECG:  Compared to current    Comparison ECG info:  5/4/23 normal ekg    Similarity:  No change  Interpretation:     Interpretation: abnormal    Rate:     ECG rate:  77    ECG rate assessment: normal    Rhythm:     Rhythm: sinus rhythm    Ectopy:     Ectopy: PAC    QRS:     QRS axis:  Normal    QRS intervals:  Normal  Conduction:     Conduction: normal    ST segments:     ST segments:  Normal  T waves:     T waves: inverted      Inverted:  AVL             ED Course  ED Course as of 06/30/23 1506   Fri Jun 30, 2023   1107 Hemoglobin(!): 10.6  10.9 one month ago   1141 Creatinine(!): 1.44  1.51 one month ago   1246 PT/OT eval ordered   1459 Per case management, patient can return home with continued services from [address] [facility]  80year old female presents for evaluation after unwitnessed fall associated with dizziness. Bedside imaging including CXR and xray of the pelvis without acute traumatic pathology. CTH and C-spine also negative for traumatic pathology. Xray of the right shoulder without acute fracture or dislocation on my independent interpretation. Labs with anemia similar to prior and stable CKD. Patient stable for discharge home into the care of her son with home services. PCP follow up. Return precautions provided. Amount and/or Complexity of Data Reviewed  Labs: ordered. Decision-making details documented in ED Course. Radiology: ordered and independent interpretation performed. Risk  Prescription drug management. Disposition  Priority One Transfer: No  Final diagnoses:   Postural dizziness   Fall, initial encounter   Right shoulder pain   Nausea     Time reflects when diagnosis was documented in both MDM as applicable and the Disposition within this note     Time User Action Codes Description Comment    6/30/2023  2:56 PM Janace Tuckahoe J Add [R42] Postural dizziness     6/30/2023  2:56 PM Candy Pica Add [A71. UZNQ] Fall, initial encounter     6/30/2023  2:56 PM Candy Pica Add [I73.101] Right shoulder pain     6/30/2023  3:03 PM Candy Pica Add [R11.0] Nausea       ED Disposition     ED Disposition   Discharge    Condition   Stable    Date/Time   Fri Jun 30, 2023  2:58 PM    [address] discharge to home/self care.                Follow-up Information     Follow up With Specialties Details Why Contact Info Additional Information    Romina Diggs MD Family Medicine Schedule an appointment as soon as possible for a visit in 3 days for re-evaluation [address] [facility] [address] Emergency Department Emergency Medicine Go to  If symptoms worsen [address] [facility] [id number]  [address] [facility] Emergency Department, [address], [place], [address]        Current Discharge Medication List      START taking these medications    Details   ondansetron (Zofran ODT) 4 mg disintegrating tablet Take 1 tablet (4 mg total) by mouth every 8 (eight) hours as needed for nausea or vomiting  Qty: 12 tablet, Refills: 0    Associated Diagnoses: Nausea         CONTINUE these medications which have NOT CHANGED    Details   acetaminophen (TYLENOL) 500 mg tablet Take 500 mg by mouth every 6 (six) hours as needed for mild pain (unsure dosage)      ! ! amLODIPine (NORVASC) 2.5 mg tablet Take 2.5 mg by mouth daily. Take with 5mg for total dose of 7.5mg  Indications: High Blood Pressure Disorder      !! amLODIPine (NORVASC) 5 mg tablet Take 5 mg by mouth daily. Take with 2.5mg for total dose of 7.5mg daily   Indications: High Blood Pressure Disorder      aspirin (ECOTRIN LOW STRENGTH) 81 mg EC tablet Take 81 mg by mouth      levothyroxine 100 mcg tablet Take 100 mcg by mouth daily      sitaGLIPtin (JANUVIA) 25 mg tablet       tiotropium (Spiriva Respimat) 2.5 MCG/ACT AERS inhaler Inhale 2 puffs daily       ! ! - Potential duplicate medications found. Please discuss with provider. No discharge procedures on file.     PDMP Review       Value Time User    PDMP Reviewed  Yes 5/6/2023  2:29 PM Raf Castillo MD          ED Provider  Electronically Signed by         Wayne Flynn MD  06/30/23 6925

## 2023-06-30 NOTE — OCCUPATIONAL THERAPY NOTE
Occupational Therapy Evaluation     Patient Name: Allie Hassan  TXZKY'V Date: 6/30/2023  Problem List  Active Problems: There are no active Hospital Problems. Past Medical History  Past Medical History:   Diagnosis Date    COPD (chronic obstructive pulmonary disease) ([address])     Diabetes mellitus ([address])     Disease of thyroid gland     Hypertension      Past Surgical History  History reviewed. No pertinent surgical history. 06/30/23 1446   OT Last Visit   OT Visit Date 06/30/23   Note Type   Note type Evaluation   Pain Assessment   Pain Assessment Tool 0-10   Pain Score No Pain   Restrictions/Precautions   Weight Bearing Precautions Per Order No   Other Precautions Cognitive; Chair Alarm; Fall Risk   Home Living   Type of [address] [place] [address] Two level; Able to live on main level with bedroom/bathroom   Bathroom Shower/Tub Tub/shower unit   [place]; Wheelchair-manual   Prior Function   Level of [place] Needs assistance with ADLs; Needs assistance with IADLS; Independent with functional mobility  (Pt able to perform ADLs independently at times)   Lives With [facility] Help From Family;Home health   IADLs Family/Friend/Other provides transportation; Family/Friend/Other provides meals; Family/Friend/Other provides medication management   Falls in the last 6 months >10   Vocational Retired   Lifestyle   Reciprocal Relationships Son reports that pt has family support at home. Reports pt requires fluctuating level of assist at home. Subjective   Subjective Pt received in supine. Pt agreeable to session. ADL   Eating Assistance 5  Supervision/Setup   Grooming Assistance 5  Supervision/Setup   UB Dressing Assistance 4  Minimal Assistance   LB Dressing Assistance 4  Minimal Assistance   Toileting Assistance  4  Minimal Assistance   Bed Mobility   Supine to Sit 4  Minimal assistance   Additional items Assist x 1   Additional Comments Pt remained seated in recliner by end of session. Transfers   Sit to Stand 4  Minimal assistance   Additional items Assist x 1;Verbal cues   Stand to Sit 4  Minimal assistance   Additional items Assist x 1;Verbal cues   Functional Mobility   Functional Mobility 4  Minimal assistance   Additional Comments x 1,RW   Balance   Static Sitting Fair +   Dynamic Sitting Fair   Static Standing Fair -   Dynamic Standing Poor +   Activity Tolerance   Activity Tolerance Patient tolerated treatment well   Medical Staff Made Aware PT Lisa   RUE Assessment   RUE Assessment WFL   LUE Assessment   LUE Assessment WFL   Cognition   Overall Cognitive Status Impaired   Arousal/Participation Alert   Attention Attends with cues to redirect   Orientation Level Oriented to person   Memory Decreased recall of precautions;Decreased recall of recent events;Decreased short term memory   Following Commands Follows one step commands with increased time or repetition   Comments Pt [place]   Assessment   Limitation Decreased ADL status; Decreased self-care trans;Decreased high-level ADLs; Decreased cognition;Decreased Safe judgement during ADL   Assessment Pt is a 80 y.o. female seen for OT evaluation at [facility], admitted 6/30/2023 s/p fall. Comorbidities affecting pt's functional performance at time of assessment include: generalized weakness, COPD, type II DM, chronic HF, recurrent falls, etc (see chart). Personal factors affecting pt at time of IE include:limited insight into deficits and decreased functional mobiity. Prior to admission, pt was living with family with 1st floor set-up. Pt primarily requiring assist with ADLs, however able to perform independently at times. Pt also uses a RW at baseline.  Upon evaluation: Pt requires min A x 1 for bed mobility, min A x 1 for functional mobility/transfers, sup-min A for UB ADLs and min A for LB ADLS 2* the following deficits impacting occupational performance: decreased strength, decreased balance, decreased tolerance, impaired memory and impaired problem solving. Full objective findings from OT assessment regarding body systems outlined above. These impairments risk for falls  limit pt's ability to safely engage in all baseline areas of occupation and mobility. Pt to benefit from continued skilled OT tx while in the hospital to address deficits as defined above and maximize level of functional independence w ADL's and functional mobility. Occupational Performance areas to address include: bathing/shower, toilet hygiene, dressing and functional mobility. This evaluation required an extensive review of medical and/or therapy records and additional review of physical, cognitive and psychosocial history related to functional performance. Based upon functional performance deficits and assessments, this evaluation has been identified as a high complexity evaluation. The patient's raw score on the AM-PAC Daily Activity inpatient short form is 20, standardized score is 42.03, greater than 39.4. Patients at this level are likely to benefit from DC to home. However please refer to therapist recommendation for discharge planning given other factors that may influence destination. At this time, OT recommendations at time of discharge are level 3 resources. Goals   Patient Goals Pt wishes to get home   Plan   Treatment Interventions ADL retraining;Functional transfer training;Patient/family training; Compensatory technique education;Cognitive reorientation   Goal Expiration Date 07/10/23   OT Treatment Day 0   OT Frequency 2-3x/wk   Recommendation   UB Rehab Discharge Recommendation (PT/OT) Level 3  (with continued social support)   Equipment Recommended   (Discussed with son to utilize chair alarm to assist with fall prevention. Son endorsed that family currently owns. )   [facility] Daily Activity Inpatient   Lower Body Dressing 3   Bathing 3   Toileting 3   Upper Body Dressing 3   Grooming 4   Eating 4   Daily Activity Raw Score 20   Daily Activity Standardized Score (Calc for Raw Score >=11) 42.03   AM-PAC Applied Cognition Inpatient   Following a Speech/Presentation 2   Understanding Ordinary Conversation 3   Taking Medications 2   Remembering Where Things Are Placed or Put Away 1   Remembering List of 4-5 Errands 1   Taking Care of Complicated Tasks 1   Applied Cognition Raw Score 10   Applied Cognition Standardized Score 24.98   End of Consult   Education Provided Yes   Patient Position at End of Consult Bedside chair;Bed/Chair alarm activated; All needs within reach   Nurse Communication Nurse aware of consult        Pt will achieve the following goals within 10 days. *Pt will complete LB bathing and dressing with sup . * Pt will complete toileting w/ sup w/ G hygiene/thoroughness using DME PRN    *Pt will complete bed mobility with sup, with bed flat and no side rail to prep for purposeful tasks    *Pt will perform functional transfers with on/off all surfaces with sup using DME as needed w/ G balance/safety. *Patient will verbalize 3 safety awareness/ principles to prevent falls in the home setting. *Pt will improve functional mobility during ADL/IADL/leisure tasks to sup using DME as needed w/ G balance/safety.      Osiel Schmitz, OTR/L

## 2023-06-30 NOTE — PHYSICAL THERAPY NOTE
PHYSICAL THERAPY EVALUATION NOTE    Patient Name: Day Cleveland  SHQKA'I Date: 2023    AGE:   80 y.o.  Mrn:   [id number]  ADMIT DX:  Unspecified multiple injuries, initial encounter [T07. XXXA]    Past Medical History:   Diagnosis Date    COPD (chronic obstructive pulmonary disease) ([address])     Diabetes mellitus ([address])     Disease of thyroid gland     Hypertension      Length Of Stay: 0  PHYSICAL THERAPY EVALUATION :   Patient's identity confirmed via 2 patient identifiers (full name and ) at start of session       23 1420   PT Last Visit   PT Visit Date 23   Note Type   Note type Evaluation   Pain Assessment   Pain Assessment Tool 0-10   Pain Score No Pain   Restrictions/Precautions   Weight Bearing Precautions Per Order No  (imaging negative)   Other Precautions Cognitive; Chair Alarm; Bed Alarm; Fall Risk;Hard of hearing   Home Living   Type of [address] [place] [address] Two level; Able to live on main level with bedroom/bathroom;Stairs to enter with rails  ([facility], [facility])   [place]; Wheelchair-manual   Prior Function   Level of [place] Independent with functional mobility; Needs assistance with ADLs  (A as needed)   Lives With Family  (son, KARIN)   Receives Help From Family;Home health  (HHPT/OT, HHA 1-2x/wk)   IADLs Family/Friend/Other provides transportation; Family/Friend/Other provides meals; Family/Friend/Other provides medication management   Falls in the last 6 months (S)  >10   Vocational Retired   General   Family/Caregiver Present Yes  (Son present for entire session)   Cognition   Overall Cognitive Status Impaired   Arousal/Participation Alert   Orientation Level Oriented to person;Oriented to place   Memory Decreased recall of recent events;Decreased short term memory   Following Commands Follows one step commands with increased time or repetition   Comments Pt very pleasant, [place]   RLE Assessment RLE Assessment WFL   Strength RLE   RLE Overall Strength 3+/5   LLE Assessment   LLE Assessment WFL   Strength LLE   LLE Overall Strength 3+/5   Bed Mobility   Supine to Sit 4  Minimal assistance   Additional items Assist x 1; Increased time required   Additional Comments Pt curled up towards R side of rney upon arrival   Transfers   Sit to Stand 4  Minimal assistance  (CGA)   Additional items Assist x 1; Increased time required   Stand to Sit 4  Minimal assistance   Additional items Assist x 1; Increased time required;Verbal cues   Ambulation/Elevation   Gait pattern Decreased foot clearance   Gait Assistance 4  Minimal assist  (CGA)   Additional items Assist x 1;Verbal cues   Assistive Device Rolling walker   Distance 100 ft   Balance   Static Sitting Fair +   Static Standing Fair -   Ambulatory Poor +   Activity Tolerance   Activity Tolerance Patient tolerated treatment well   Medical Staff Made Aware OT Kassidy   Assessment   Problem List Decreased strength;Decreased endurance; Impaired balance;Decreased mobility; Decreased safety awareness; Impaired hearing   Assessment Landry Gupta is a 80 y.o. Female who presents to [address] [place] [address] on 6/30/2023 from home w/ c/o fall and diagnosis of fall. Orders for PT eval and treat received. Pt presents w/ comorbidities of COPD, DMII, recent R hip replacement. At baseline, pt mobilizes S w / RW, and reports >10 falls in the last 6 months. Upon evaluation, pt presents w/ the following deficits: weakness, impaired balance, impaired hearing and decreased endurance. Upon eval, pt requires min A x 1 for bed mobility, min A x 1 for transfers, and min A x 1 for gait. Based on this PT evaluation today, patient's discharge recommendation is for Level III w/ consistent home supervision and assistance.  During this admission, pt would benefit from continued skilled inpatient PT in the acute care setting in order to address the abovementioned deficits to maximize function and mobility before DC from acute care. Goals   Patient Goals to go home   STG Expiration Date 07/10/23   Short Term Goal #1 Patient will: Perform all bed mobility tasks w/ supervision to improve pt's independence w/ repositioning for decrease risk of skin breakdown, Perform all transfers w/ supervision consistently from various height surfaces in order to improve I w/ engagement w/ real-world environments/situations, Ambulate at least 150 ft. with roller walker w/ supervision w/o LOB to facilitate return and engagement w/ previous living environment and Navigate 2 stairs w/ supervision with unilateral handrail to either improve independence w/ entering home and/or so patient can fully access living areas in home   Plan   Treatment/Interventions Functional transfer training;LE strengthening/ROM; Elevations; Therapeutic exercise; Endurance training;Cognitive reorientation; Bed mobility; Patient/family training;Equipment eval/education;Gait training   PT Frequency 2-3x/wk   Recommendation   UB Rehab Discharge Recommendation (PT/OT) (S)  Level 3  (w/ confirmed family support, if not then level 3)   Equipment Recommended Walker   AM-PAC Basic Mobility Inpatient   Turning in Flat Bed Without Bedrails 3   Lying on Back to Sitting on Edge of Flat Bed Without Bedrails 3   Moving Bed to Chair 3   Standing Up From Chair Using Arms 3   Walk in Room 3   Climb 3-5 Stairs With Railing 3   Basic Mobility Inpatient Raw Score 18   Basic Mobility Standardized Score 41.05   Highest Level Of Mobility   JH-HLM Goal 6: Walk 10 steps or more   JH-HLM Achieved 7: Walk 25 feet or more   Additional Treatment Session   Start Time 1440   End Time 1448   Treatment Assessment Pt seen for PT intervention w/ focus on education. Pt educated on using her RW at all times for functional mobility (per son, she will abandon at times). Reviewed not rushing things - especially since she complains of lightheadedness which leads to falls.  Educated son on potentially obtaining chair/bed alarms to assist w/ safety so pt can be content in another room without them having to watch at all times. He verbalizes understanding. No further DME needs or concerns at this time. Additional Treatment Day 1   End of Consult   Patient Position at End of Consult Bedside chair;Bed/Chair alarm activated; All needs within reach         The patient's AM-PAC Basic Mobility Inpatient Short Form Raw Score is 18, Standardized Score is 41.05. A standardized score greater than 38.32 (raw score of 16) suggests the patient may benefit from discharge to home which may not coincide with above PT recommendations. However please refer to therapist recommendation for discharge planning given other factors that may influence destination.     Pt would benefit from skilled inpatient PT during this admission in order to facilitate progress towards goals to maximize functional independence    Sergio Palomino, PT, DPT

## 2023-06-30 NOTE — CASE MANAGEMENT
Case Management Progress Note    Patient name Richard Robertson  Location ED 07/ED 07 MRN [id number]  : 1934 Date 2023       LOS (days): 0  Geometric Mean LOS (GMLOS) (days):   Days to GMLOS:        OBJECTIVE:        Current admission status: Emergency  Preferred Pharmacy:   [facility] [address] [address]  Phone: [phone] Fax: [phone] #[id number] [place] [address] [address]  Phone: [phone] Fax: [phone]    Primary Care Provider: Piotr Manzano MD    Primary Insurance: Sabra Leyden CROSS [address] [place] [address]  Secondary Insurance:     PROGRESS NOTE: Pt current with [facility] for HHA/PT/OT. Pt is not being admitted. Her status remains emergency. No need for return referral to [facility]. Services were not closed. PT ready for dc from CM stand point. Cm spoke with pts son.

## 2023-07-02 ENCOUNTER — HOME CARE VISIT (OUTPATIENT)
Dept: HOME HEALTH SERVICES | Facility: HOME HEALTHCARE | Age: 88
End: 2023-07-02
Payer: COMMERCIAL

## 2023-07-03 ENCOUNTER — HOME CARE VISIT (OUTPATIENT)
Dept: HOME HEALTH SERVICES | Facility: HOME HEALTHCARE | Age: 88
End: 2023-07-03
Payer: COMMERCIAL

## 2023-07-03 VITALS — OXYGEN SATURATION: 95 % | DIASTOLIC BLOOD PRESSURE: 70 MMHG | SYSTOLIC BLOOD PRESSURE: 140 MMHG | HEART RATE: 75 BPM

## 2023-07-03 PROCEDURE — G0152 HHCP-SERV OF OT,EA 15 MIN: HCPCS

## 2023-07-03 PROCEDURE — G0156 HHCP-SVS OF AIDE,EA 15 MIN: HCPCS

## 2023-07-04 ENCOUNTER — HOME CARE VISIT (OUTPATIENT)
Dept: HOME HEALTH SERVICES | Facility: HOME HEALTHCARE | Age: 88
End: 2023-07-04
Payer: COMMERCIAL

## 2023-07-05 ENCOUNTER — HOME CARE VISIT (OUTPATIENT)
Dept: HOME HEALTH SERVICES | Facility: HOME HEALTHCARE | Age: 88
End: 2023-07-05
Payer: COMMERCIAL

## 2023-07-05 VITALS — DIASTOLIC BLOOD PRESSURE: 60 MMHG | OXYGEN SATURATION: 95 % | SYSTOLIC BLOOD PRESSURE: 122 MMHG | HEART RATE: 78 BPM

## 2023-07-05 PROCEDURE — G0152 HHCP-SERV OF OT,EA 15 MIN: HCPCS

## 2023-07-06 ENCOUNTER — HOME CARE VISIT (OUTPATIENT)
Dept: HOME HEALTH SERVICES | Facility: HOME HEALTHCARE | Age: 88
End: 2023-07-06
Payer: COMMERCIAL

## 2023-07-06 VITALS
SYSTOLIC BLOOD PRESSURE: 118 MMHG | OXYGEN SATURATION: 96 % | HEART RATE: 64 BPM | RESPIRATION RATE: 19 BRPM | DIASTOLIC BLOOD PRESSURE: 76 MMHG | TEMPERATURE: 96.9 F

## 2023-07-06 PROCEDURE — G0156 HHCP-SVS OF AIDE,EA 15 MIN: HCPCS

## 2023-07-06 PROCEDURE — G0157 HHC PT ASSISTANT EA 15: HCPCS

## 2023-07-07 ENCOUNTER — HOME CARE VISIT (OUTPATIENT)
Dept: HOME HEALTH SERVICES | Facility: HOME HEALTHCARE | Age: 88
End: 2023-07-07
Payer: COMMERCIAL

## 2023-07-07 VITALS
SYSTOLIC BLOOD PRESSURE: 124 MMHG | DIASTOLIC BLOOD PRESSURE: 68 MMHG | TEMPERATURE: 97.4 F | OXYGEN SATURATION: 96 % | RESPIRATION RATE: 16 BRPM | HEART RATE: 66 BPM

## 2023-07-07 PROCEDURE — G0299 HHS/HOSPICE OF RN EA 15 MIN: HCPCS

## 2023-07-08 ENCOUNTER — HOSPITAL ENCOUNTER (EMERGENCY)
Facility: HOSPITAL | Age: 88
Discharge: HOME | End: 2023-07-08
Attending: EMERGENCY MEDICINE
Payer: COMMERCIAL

## 2023-07-08 ENCOUNTER — APPOINTMENT (EMERGENCY)
Dept: CT IMAGING | Facility: HOSPITAL | Age: 88
End: 2023-07-08
Payer: COMMERCIAL

## 2023-07-08 ENCOUNTER — APPOINTMENT (EMERGENCY)
Dept: RADIOLOGY | Facility: HOSPITAL | Age: 88
End: 2023-07-08
Payer: COMMERCIAL

## 2023-07-08 VITALS
BODY MASS INDEX: 25.89 KG/M2 | SYSTOLIC BLOOD PRESSURE: 164 MMHG | HEART RATE: 71 BPM | HEIGHT: 62 IN | OXYGEN SATURATION: 96 % | DIASTOLIC BLOOD PRESSURE: 72 MMHG | TEMPERATURE: 98 F | RESPIRATION RATE: 17 BRPM

## 2023-07-08 DIAGNOSIS — S09.90XA HEAD INJURY: ICD-10-CM

## 2023-07-08 DIAGNOSIS — N39.0 UTI (URINARY TRACT INFECTION): ICD-10-CM
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
## 2023-07-08 DIAGNOSIS — S70.312A: ICD-10-CM

## 2023-07-08 LAB
2HR DELTA HS TROPONIN: -1 NG/L
ABO GROUP BLD: NORMAL
ALBUMIN SERPL BCP-MCNC: 3.6 G/DL (ref 3.5–5)
ALP SERPL-CCNC: 85 U/L (ref 34–104)
ALT SERPL W P-5'-P-CCNC: 10 U/L (ref 7–52)
ANION GAP SERPL CALCULATED.3IONS-SCNC: 6 MMOL/L
APTT PPP: 29 SECONDS (ref 23–37)
AST SERPL W P-5'-P-CCNC: 11 U/L (ref 13–39)
ATRIAL RATE: 82 BPM
BACTERIA UR QL AUTO: ABNORMAL /HPF
BASOPHILS # BLD AUTO: 0.05 THOUSANDS/ÂΜL (ref 0–0.1)
BASOPHILS NFR BLD AUTO: 0 % (ref 0–1)
BILIRUB SERPL-MCNC: 0.74 MG/DL (ref 0.2–1)
BILIRUB UR QL STRIP: NEGATIVE
BLD GP AB SCN SERPL QL: NEGATIVE
BUN SERPL-MCNC: 33 MG/DL (ref 5–25)
CALCIUM SERPL-MCNC: 9.4 MG/DL (ref 8.4–10.2)
CARDIAC TROPONIN I PNL SERPL HS: 12 NG/L
CARDIAC TROPONIN I PNL SERPL HS: 13 NG/L
CHLORIDE SERPL-SCNC: 105 MMOL/L (ref 96–108)
CK SERPL-CCNC: 45 U/L (ref 26–192)
CLARITY UR: ABNORMAL
CO2 SERPL-SCNC: 27 MMOL/L (ref 21–32)
COLOR UR: COLORLESS
CREAT SERPL-MCNC: 1.4 MG/DL (ref 0.6–1.3)
EOSINOPHIL # BLD AUTO: 0.26 THOUSAND/ÂΜL (ref 0–0.61)
EOSINOPHIL NFR BLD AUTO: 2 % (ref 0–6)
ERYTHROCYTE [DISTWIDTH] IN BLOOD BY AUTOMATED COUNT: 15.1 % (ref 11.6–15.1)
GFR SERPL CREATININE-BSD FRML MDRD: 33 ML/MIN/1.73SQ M
GLUCOSE SERPL-MCNC: 110 MG/DL (ref 65–140)
GLUCOSE UR STRIP-MCNC: NEGATIVE MG/DL
HCT VFR BLD AUTO: 34.3 % (ref 34.8–46.1)
HGB BLD-MCNC: 10.9 G/DL (ref 11.5–15.4)
HGB UR QL STRIP.AUTO: ABNORMAL
IMM GRANULOCYTES # BLD AUTO: 0.07 THOUSAND/UL (ref 0–0.2)
IMM GRANULOCYTES NFR BLD AUTO: 1 % (ref 0–2)
INR PPP: 0.93 (ref 0.84–1.19)
KETONES UR STRIP-MCNC: NEGATIVE MG/DL
LEUKOCYTE ESTERASE UR QL STRIP: ABNORMAL
LYMPHOCYTES # BLD AUTO: 2.75 THOUSANDS/ÂΜL (ref 0.6–4.47)
LYMPHOCYTES NFR BLD AUTO: 20 % (ref 14–44)
MCH RBC QN AUTO: 29.9 PG (ref 26.8–34.3)
MCHC RBC AUTO-ENTMCNC: 31.8 G/DL (ref 31.4–37.4)
MCV RBC AUTO: 94 FL (ref 82–98)
MONOCYTES # BLD AUTO: 1.1 THOUSAND/ÂΜL (ref 0.17–1.22)
MONOCYTES NFR BLD AUTO: 8 % (ref 4–12)
NEUTROPHILS # BLD AUTO: 9.82 THOUSANDS/ÂΜL (ref 1.85–7.62)
NEUTS SEG NFR BLD AUTO: 69 % (ref 43–75)
NITRITE UR QL STRIP: NEGATIVE
NON-SQ EPI CELLS URNS QL MICRO: ABNORMAL /HPF
NRBC BLD AUTO-RTO: 0 /100 WBCS
P AXIS: 70 DEGREES
PH UR STRIP.AUTO: 7.5 [PH]
PLATELET # BLD AUTO: 305 THOUSANDS/UL (ref 149–390)
PMV BLD AUTO: 9.7 FL (ref 8.9–12.7)
POTASSIUM SERPL-SCNC: 4.1 MMOL/L (ref 3.5–5.3)
PR INTERVAL: 130 MS
PROT SERPL-MCNC: 7.8 G/DL (ref 6.4–8.4)
PROT UR STRIP-MCNC: ABNORMAL MG/DL
PROTHROMBIN TIME: 13.2 SECONDS (ref 11.6–14.5)
QRS AXIS: 74 DEGREES
QRSD INTERVAL: 66 MS
QT INTERVAL: 382 MS
QTC INTERVAL: 474 MS
RBC # BLD AUTO: 3.64 MILLION/UL (ref 3.81–5.12)
RBC #/AREA URNS AUTO: ABNORMAL /HPF
RH BLD: NEGATIVE
SODIUM SERPL-SCNC: 138 MMOL/L (ref 135–147)
SP GR UR STRIP.AUTO: 1.01 (ref 1–1.03)
SPECIMEN EXPIRATION DATE: NORMAL
T WAVE AXIS: 72 DEGREES
UROBILINOGEN UR STRIP-ACNC: <2 MG/DL
VENTRICULAR RATE: 93 BPM
WBC # BLD AUTO: 14.05 THOUSAND/UL (ref 4.31–10.16)
WBC #/AREA URNS AUTO: ABNORMAL /HPF

## 2023-07-08 PROCEDURE — 93005 ELECTROCARDIOGRAM TRACING: CPT

## 2023-07-08 PROCEDURE — 87086 URINE CULTURE/COLONY COUNT: CPT | Performed by: PHYSICIAN ASSISTANT

## 2023-07-08 PROCEDURE — 86900 BLOOD TYPING SEROLOGIC ABO: CPT | Performed by: PHYSICIAN ASSISTANT

## 2023-07-08 PROCEDURE — 84484 ASSAY OF TROPONIN QUANT: CPT | Performed by: PHYSICIAN ASSISTANT

## 2023-07-08 PROCEDURE — 86850 RBC ANTIBODY SCREEN: CPT | Performed by: PHYSICIAN ASSISTANT

## 2023-07-08 PROCEDURE — 96361 HYDRATE IV INFUSION ADD-ON: CPT

## 2023-07-08 PROCEDURE — 82550 ASSAY OF CK (CPK): CPT | Performed by: PHYSICIAN ASSISTANT

## 2023-07-08 PROCEDURE — 87186 SC STD MICRODIL/AGAR DIL: CPT | Performed by: PHYSICIAN ASSISTANT

## 2023-07-08 PROCEDURE — 90715 TDAP VACCINE 7 YRS/> IM: CPT | Performed by: PHYSICIAN ASSISTANT

## 2023-07-08 PROCEDURE — 87077 CULTURE AEROBIC IDENTIFY: CPT | Performed by: PHYSICIAN ASSISTANT

## 2023-07-08 PROCEDURE — 85610 PROTHROMBIN TIME: CPT | Performed by: PHYSICIAN ASSISTANT

## 2023-07-08 PROCEDURE — 71045 X-RAY EXAM CHEST 1 VIEW: CPT

## 2023-07-08 PROCEDURE — 70450 CT HEAD/BRAIN W/O DYE: CPT

## 2023-07-08 PROCEDURE — 86901 BLOOD TYPING SEROLOGIC RH(D): CPT | Performed by: PHYSICIAN ASSISTANT

## 2023-07-08 PROCEDURE — 36415 COLL VENOUS BLD VENIPUNCTURE: CPT | Performed by: PHYSICIAN ASSISTANT

## 2023-07-08 PROCEDURE — 80053 COMPREHEN METABOLIC PANEL: CPT | Performed by: PHYSICIAN ASSISTANT

## 2023-07-08 PROCEDURE — 96360 HYDRATION IV INFUSION INIT: CPT

## 2023-07-08 PROCEDURE — 72125 CT NECK SPINE W/O DYE: CPT

## 2023-07-08 PROCEDURE — 90471 IMMUNIZATION ADMIN: CPT

## 2023-07-08 PROCEDURE — 85730 THROMBOPLASTIN TIME PARTIAL: CPT | Performed by: PHYSICIAN ASSISTANT

## 2023-07-08 PROCEDURE — 85025 COMPLETE CBC W/AUTO DIFF WBC: CPT | Performed by: PHYSICIAN ASSISTANT

## 2023-07-08 PROCEDURE — 99285 EMERGENCY DEPT VISIT HI MDM: CPT

## 2023-07-08 PROCEDURE — 81001 URINALYSIS AUTO W/SCOPE: CPT | Performed by: PHYSICIAN ASSISTANT

## 2023-07-08 RX ORDER — CEPHALEXIN 500 MG/1
500 CAPSULE ORAL EVERY 12 HOURS SCHEDULED
Qty: 14 CAPSULE | Refills: 0 | Status: SHIPPED | OUTPATIENT
Start: 2023-07-08 | End: 2023-07-15

## 2023-07-08 RX ORDER — ONDANSETRON 4 MG/1
4 TABLET, ORALLY DISINTEGRATING ORAL ONCE
Status: COMPLETED | OUTPATIENT
Start: 2023-07-08 | End: 2023-07-08

## 2023-07-08 RX ORDER — CEPHALEXIN 250 MG/1
500 CAPSULE ORAL ONCE
Status: COMPLETED | OUTPATIENT
Start: 2023-07-08 | End: 2023-07-08

## 2023-07-08 RX ORDER — AMLODIPINE BESYLATE 5 MG/1
5 TABLET ORAL ONCE
Status: COMPLETED | OUTPATIENT
Start: 2023-07-08 | End: 2023-07-08

## 2023-07-08 RX ORDER — ONDANSETRON 4 MG/1
4 TABLET, ORALLY DISINTEGRATING ORAL EVERY 6 HOURS PRN
Qty: 20 TABLET | Refills: 0 | Status: SHIPPED | OUTPATIENT
Start: 2023-07-08

## 2023-07-08 RX ADMIN — AMLODIPINE BESYLATE 5 MG: 5 TABLET ORAL at 11:34

## 2023-07-08 RX ADMIN — TETANUS TOXOID, REDUCED DIPHTHERIA TOXOID AND ACELLULAR PERTUSSIS VACCINE, ADSORBED 0.5 ML: 5; 2.5; 8; 8; 2.5 SUSPENSION INTRAMUSCULAR at 10:59

## 2023-07-08 RX ADMIN — ONDANSETRON 4 MG: 4 TABLET, ORALLY DISINTEGRATING ORAL at 13:53

## 2023-07-08 RX ADMIN — CEPHALEXIN 500 MG: 250 CAPSULE ORAL at 13:41

## 2023-07-08 RX ADMIN — SODIUM CHLORIDE 500 ML: 0.9 INJECTION, SOLUTION INTRAVENOUS at 11:04

## 2023-07-08 NOTE — ED PROVIDER NOTES
Emergency Department Employee Health Note  Radha Fonseca 80 y.o. female MRN: [id number]  Unit/Bed#: ED TR13/TR13B Encounter: [id number]        History of Present Illness     Chief Complaint:   Chief Complaint   Patient presents with   • Fall     Patient presents to ED via [facility] from home for unwitnessed fall. Per EMS family  reports finding her in the hallway at home, patient baseline GCS 14 unable to recall details of fall/     HPI:  Radha Fonseca is a 80 y.o. female who presents with head injury after a fall. Mechanism:Details of Incident: pt was found down in hallway at home Injury Date: 07/08/23 Injury Time:  (unknown)      Patient is an 81 y/o F with h/o COPD, HTN, DM that was BIBA after an unwitnessed fall. Patient has no complaints. Unsure if she hit her head. She is on aspirin daily. Patient is alert and oriented to person and place. She has a superficial clean abrasion to left thigh. NO other signs of injury. Cervical collar placed by EMS. Patient does not know how she fell. History provided by:  Patient and EMS personnel  Fall  Mechanism of injury: fall    Injury location:  Head/neck  Head/neck injury location:  Head  Incident location:  Home  Arrived directly from scene: yes    Fall:     Fall occurred:  Unable to specify    Impact surface:  Unable to specify    Point of impact:  Unable to specify  Protective equipment: none    Suspicion of alcohol use: no    Suspicion of drug use: no    Tetanus status:  Unknown  Prior to arrival data:     Patient ambulatory at scene: no      Blood loss:  Minimal    Responsiveness at scene:  Alert    Orientation at scene:  Person and place    Loss of consciousness: unknown.       Immobilization:  C-collar  Associated symptoms: no abdominal pain, no back pain, no chest pain, no headaches, no nausea, no neck pain and no vomiting    Risk factors: COPD and diabetes    Risk factors comment:  Aspirin daily    Review of Systems   Constitutional: Negative for chills and fever. HENT: Negative. Respiratory: Negative for cough and shortness of breath. Cardiovascular: Negative for chest pain, palpitations and leg swelling. Gastrointestinal: Negative for abdominal pain, diarrhea, nausea and vomiting. Musculoskeletal: Negative for back pain and neck pain. Skin: Positive for wound (abrasion left thigh. ). Negative for color change and rash. Neurological: Negative for dizziness, weakness, light-headedness, numbness and headaches. All other systems reviewed and are negative. Historical Information     Immunizations:   Immunization History   Administered Date(s) Administered   • COVID-19 PFIZER VACCINE 0.3 ML IM 2021, 2021, 10/21/2021, 10/12/2022   • COVID-19 Pfizer vac (Messi-sucrose, gray cap) 12 yr+ IM 04/15/2022, 10/12/2022   • INFLUENZA 2015, 2016, 2017, 2018, 2019, 10/14/2020, 10/12/2022   • Influenza Split High Dose Preservative Free IM 2014   • Influenza, high dose seasonal 0.7 mL 2021   • Pneumococcal Conjugate 13-Valent 2015   • Pneumococcal Polysaccharide PPV23 2001   • Tuberculin Skin Test 2023, 2023       Past Medical History:   Diagnosis Date   • COPD (chronic obstructive pulmonary disease) ([facility])    • Diabetes mellitus ([facility])    • Disease of thyroid gland    • Hypertension      History reviewed. No pertinent family history. History reviewed. No pertinent surgical history. Social History     Tobacco Use   • Smoking status: Former     Types: Cigarettes     Quit date: 2022     Years since quittin.5   Vaping Use   • Vaping Use: Never used   Substance Use Topics   • Alcohol use: Never   • Drug use: Never     E-Cigarette/Vaping   • E-Cigarette Use Never User      E-Cigarette/Vaping Substances         Meds/Allergies   Prior to Admission Medications   Prescriptions Last Dose Informant Patient Reported?  Taking?   acetaminophen (TYLENOL) 500 mg tablet   Yes No   Sig: Take 500 mg by mouth every 6 (six) hours as needed for mild pain (unsure dosage)   amLODIPine (NORVASC) 2.5 mg tablet   Yes No   Sig: Take 2.5 mg by mouth daily. Take with 5mg for total dose of 7.5mg  Indications: High Blood Pressure Disorder   amLODIPine (NORVASC) 5 mg tablet   Yes No   Sig: Take 5 mg by mouth daily. Take with 2.5mg for total dose of 7.5mg daily   Indications: High Blood Pressure Disorder   aspirin (ECOTRIN LOW STRENGTH) 81 mg EC tablet   Yes No   Sig: Take 81 mg by mouth   levothyroxine 100 mcg tablet   Yes No   Sig: Take 100 mcg by mouth daily   ondansetron (Zofran ODT) 4 mg disintegrating tablet   No No   Sig: Take 1 tablet (4 mg total) by mouth every 8 (eight) hours as needed for nausea or vomiting   sitaGLIPtin (JANUVIA) 25 mg tablet   Yes No   tiotropium (Spiriva Respimat) 2.5 MCG/ACT AERS inhaler   Yes No   Sig: Inhale 2 puffs daily      Facility-Administered Medications: None       Allergies   Allergen Reactions   • Erythromycin Other (See Comments)     unknown   • Iodine - Food Allergy Hives   • Lidocaine Hives   • Metformin Diarrhea   • Penicillin V Hives     TOLERATED CEFTRIAXONE 12/2022   • Rosuvastatin Myalgia   • Sulfamethoxazole-Trimethoprim Hives       PHYSICAL EXAM  Physical Exam  Vitals and nursing note reviewed. Constitutional:       General: She is not in acute distress. Appearance: Normal appearance. She is well-developed, well-groomed and normal weight. She is not ill-appearing or diaphoretic. Interventions: Cervical collar in place. HENT:      Head: Normocephalic and atraumatic. Right Ear: External ear normal.      Left Ear: External ear normal.      Nose: Nose normal.      Mouth/Throat:      Mouth: Mucous membranes are moist.      Pharynx: Oropharynx is clear. Eyes:      General: Lids are normal.      Extraocular Movements: Extraocular movements intact. Conjunctiva/sclera: Conjunctivae normal.      Pupils: Pupils are equal, round, and reactive to light. Neck:      Comments: Cervical collar in place  Cardiovascular:      Rate and Rhythm: Normal rate and regular rhythm. Heart sounds: Normal heart sounds. Pulmonary:      Effort: Pulmonary effort is normal.      Breath sounds: Normal breath sounds. No wheezing, rhonchi or rales. Chest:      Chest wall: No deformity, swelling or tenderness. Abdominal:      General: Abdomen is flat. Bowel sounds are normal.      Palpations: Abdomen is soft. Tenderness: There is no abdominal tenderness. Musculoskeletal:      Comments: B/L UE and LE FROM, nontender to palpation. Skin:     General: Skin is warm and dry. Findings: Abrasion (superficial clean abrasion left thigh. ) present. Neurological:      Mental Status: She is alert. Mental status is at baseline. GCS: GCS eye subscore is 4. GCS verbal subscore is 5. GCS motor subscore is 6. Cranial Nerves: Cranial nerves 2-12 are intact. Sensory: Sensation is intact. Motor: Motor function is intact. Comments: Oriented to person and place. Psychiatric:         Mood and Affect: Mood normal.         Speech: Speech normal.         Behavior: Behavior is cooperative.          Vital Signs  ED Triage Vitals   Temperature Pulse Respirations Blood Pressure SpO2   07/08/23 1025 07/08/23 1023 07/08/23 1023 07/08/23 1023 07/08/23 1023   98 °F (36.7 °C) 90 16 (!) 191/90 95 %      Temp Source Heart Rate Source Patient Position - Orthostatic VS BP Location FiO2 (%)   07/08/23 1025 07/08/23 1025 07/08/23 1023 07/08/23 1023 --   Oral Monitor Lying Right arm       Pain Score       07/08/23 1023       No Pain           Vitals:    07/08/23 1023 07/08/23 1025   BP: (!) 191/90 (!) 191/90   Pulse: 90 85   Patient Position - Orthostatic VS: Lying          Certification of Exposure:    (link to [facility]: [facility] ([email or web address]): find link to BBP Exposure Instructions under important links on center of the page)    The patient is stable and has a history and physical exam consistent with an [address] EXPOSURE_INJURY:05177} and based on my assessment this exposure is [address] SIGNIFICANT/NONSIGNIFICANT:32602}      Visual Acuity  Visual Acuity    Flowsheet Row Most Recent Value   L Pupil Size (mm) 3   R Pupil Size (mm) 3          ED Medications  Medications   tetanus-diphtheria-acellular pertussis (BOOSTRIX) IM injection 0.5 mL (has no administration in time range)       Diagnostic Studies  Results Reviewed     Procedure Component Value Units Date/Time    CBC and differential [[id number]]     Lab Status: No result Specimen: Blood     Comprehensive metabolic panel [[id number]]     Lab Status: No result Specimen: Blood     CK [[id number]]     Lab Status: No result Specimen: Blood     Protime-INR [[id number]]     Lab Status: No result Specimen: Blood     APTT [[id number]]     Lab Status: No result Specimen: Blood     UA w Reflex to Microscopic w Reflex to Culture [[id number]]     Lab Status: No result Specimen: Urine, Clean Catch     HS Troponin 0hr (reflex protocol) [[id number]]     Lab Status: No result Specimen: Blood              XR Trauma chest portable   ED Interpretation by Vicente Marino PA-C (07/08 1038)   No acute abnormalities.        TRAUMA - CT head wo contrast    (Results Pending)   TRAUMA - CT spine cervical wo contrast    (Results Pending)              Procedures  ECG 12 Lead Documentation Only    Date/Time: 7/8/2023 10:40 AM    Performed by: Vicente Marino PA-C  Authorized by: Vicente Marino PA-C    Indications / Diagnosis:  Fall  ECG reviewed by me, the ED Provider: yes    Patient location:  ED  Previous ECG:     Previous ECG:  Compared to current    Similarity:  No change  Rate:     ECG rate:  93  Rhythm:     Rhythm: sinus rhythm    Ectopy:     Ectopy: PAC    Conduction:     Conduction: normal    ST segments:     ST segments:  Normal  T waves:     T waves: normal MDM    Disposition  Final diagnoses:   None     ED Disposition     None      Follow-up Information    None         Patient's Medications   Discharge Prescriptions    No medications on file       No discharge procedures on file.     PDMP Review       Value Time User    PDMP Reviewed  Yes 5/6/2023  2:29 PM Myranda Cruz MD            ED Provider  Electronically Signed by

## 2023-07-08 NOTE — ED PROVIDER NOTES
Emergency Department Trauma Note  Ellen Ramirez 80 y.o. female MRN: [id number]  Unit/Bed#: ED TR13/TR13B Encounter: [id number]      Trauma Alert: Trauma Acuity: Trauma Evaluation  Model of Arrival: Mode of Arrival: BLS via Trauma Squad Name and Number: [id number]  Trauma Team: Current Providers  Attending Provider: Loree Norman DO  Physician Assistant: Cayetano Gonzales PA-C  Registered Nurse: Arleen Tatum, RN  Registered Nurse: Ayden Zuleta, OSEAS  Consultants:     None      History of Present Illness     Chief Complaint:   Chief Complaint   Patient presents with   • Fall     Patient presents to ED via [facility] from home for unwitnessed fall. Per EMS family  reports finding her in the hallway at home, patient baseline GCS 14 unable to recall details of fall/     HPI:  Ellen Ramirez is a 80 y.o. female who presents with head injury after a fall. Mechanism:Details of Incident: pt was found down in hallway at home Injury Date: 07/08/23 Injury Time:  (unknown)      Patient is an 79 y/o F with h/o COPD, DM, HTN that was BIBA from home after an unwitnessed fall. Patient has no complaints. Unsure if she hit her head or lost consciousness. SHe is at baseline per family. She is alert and oriented to person and place. She has a superficial abrasion to left thigh, last tetanus is unknown. She has a cervical collar in place.        History provided by:  EMS personnel and patient  Fall  Mechanism of injury: fall    Injury location:  Head/neck  Head/neck injury location:  Head  Incident location:  Home  Arrived directly from scene: yes    Fall:     Fall occurred:  Unable to specify    Impact surface:  Unable to specify    Point of impact:  Unable to specify    Entrapped after fall: no    Protective equipment: none    Suspicion of alcohol use: no    Suspicion of drug use: no    Prior to arrival data:     Bystander interventions:  None    Patient ambulatory at scene: no      Blood loss:  None    Responsiveness at scene:  Alert    Orientation at scene:  Person and place    Loss of consciousness: unknown. Immobilization:  C-collar  Associated symptoms: no abdominal pain, no back pain, no chest pain, no difficulty breathing, no headaches, no neck pain and no vomiting    Risk factors: COPD    Risk factors comment:  Aspirin daily    Review of Systems   Constitutional: Negative for chills and fever. HENT: Negative. Respiratory: Negative for cough and shortness of breath. Cardiovascular: Negative for chest pain, palpitations and leg swelling. Gastrointestinal: Negative for abdominal pain and vomiting. Musculoskeletal: Negative for back pain and neck pain. Skin: Positive for wound (abrasion left thigh). Neurological: Negative for dizziness, weakness, light-headedness, numbness and headaches. Psychiatric/Behavioral: Negative for confusion. All other systems reviewed and are negative. Historical Information     Immunizations:   Immunization History   Administered Date(s) Administered   • COVID-19 PFIZER VACCINE 0.3 ML IM 03/12/2021, 04/02/2021, 10/21/2021, 10/12/2022   • COVID-19 Pfizer vac (Messi-sucrose, gray cap) 12 yr+ IM 04/15/2022, 10/12/2022   • INFLUENZA 09/25/2015, 09/07/2016, 09/13/2017, 09/17/2018, 09/18/2019, 10/14/2020, 10/12/2022   • Influenza Split High Dose Preservative Free IM 09/05/2014   • Influenza, high dose seasonal 0.7 mL 09/21/2021   • Pneumococcal Conjugate 13-Valent 04/28/2015   • Pneumococcal Polysaccharide PPV23 09/20/2001   • Tdap 07/08/2023   • Tuberculin Skin Test 05/06/2023, 05/13/2023       Past Medical History:   Diagnosis Date   • COPD (chronic obstructive pulmonary disease) ([address])    • Diabetes mellitus ([address])    • Disease of thyroid gland    • Hypertension      History reviewed. No pertinent family history. History reviewed. No pertinent surgical history.   Social History     Tobacco Use   • Smoking status: Former     Types: Cigarettes     Quit date: 12/23/2022     Years since quittin.5   Vaping Use   • Vaping Use: Never used   Substance Use Topics   • Alcohol use: Never   • Drug use: Never     E-Cigarette/Vaping   • E-Cigarette Use Never User      E-Cigarette/Vaping Substances       Family History: non-contributory    Meds/Allergies   Prior to Admission Medications   Prescriptions Last Dose Informant Patient Reported? Taking?   acetaminophen (TYLENOL) 500 mg tablet   Yes No   Sig: Take 500 mg by mouth every 6 (six) hours as needed for mild pain (unsure dosage)   amLODIPine (NORVASC) 2.5 mg tablet   Yes No   Sig: Take 2.5 mg by mouth daily. Take with 5mg for total dose of 7.5mg  Indications: High Blood Pressure Disorder   amLODIPine (NORVASC) 5 mg tablet   Yes No   Sig: Take 5 mg by mouth daily.  Take with 2.5mg for total dose of 7.5mg daily   Indications: High Blood Pressure Disorder   aspirin (ECOTRIN LOW STRENGTH) 81 mg EC tablet   Yes No   Sig: Take 81 mg by mouth   levothyroxine 100 mcg tablet   Yes No   Sig: Take 100 mcg by mouth daily   ondansetron (Zofran ODT) 4 mg disintegrating tablet   No No   Sig: Take 1 tablet (4 mg total) by mouth every 8 (eight) hours as needed for nausea or vomiting   sitaGLIPtin (JANUVIA) 25 mg tablet   Yes No   tiotropium (Spiriva Respimat) 2.5 MCG/ACT AERS inhaler   Yes No   Sig: Inhale 2 puffs daily      Facility-Administered Medications: None       Allergies   Allergen Reactions   • Erythromycin Other (See Comments)     unknown   • Iodine - Food Allergy Hives   • Lidocaine Hives   • Metformin Diarrhea   • Penicillin V Hives     TOLERATED CEFTRIAXONE 2022   • Rosuvastatin Myalgia   • Sulfamethoxazole-Trimethoprim Hives       PHYSICAL EXAM    PE limited by: nothing    Objective   Vitals:   First set: Temperature: 98 °F (36.7 °C) (23 1025)  Pulse: 90 (23 1023)  Respirations: 16 (23 1023)  Blood Pressure: (!) 191/90 (23 1023)  SpO2: 95 % (23 1023)    Primary Survey:   (A) Airway: patent  (B) Breathing: normal  (C) Circulation: Pulses:   normal  (D) Disabliity:  GCS Total:  15  (E) Expose:  Completed    Secondary Survey: (Click on Physical Exam tab above)  Physical Exam  Vitals and nursing note reviewed. Constitutional:       General: She is not in acute distress. Appearance: Normal appearance. She is well-developed, well-groomed and normal weight. She is not ill-appearing or diaphoretic. Interventions: Cervical collar in place. HENT:      Head: Normocephalic and atraumatic. Ears:      Comments: Patient [place]     Nose: Nose normal.      Mouth/Throat:      Mouth: Mucous membranes are moist.   Eyes:      Conjunctiva/sclera: Conjunctivae normal.      Pupils: Pupils are equal, round, and reactive to light. Cardiovascular:      Rate and Rhythm: Normal rate and regular rhythm. Heart sounds: Normal heart sounds. Pulmonary:      Effort: Pulmonary effort is normal.      Breath sounds: Normal breath sounds. No wheezing, rhonchi or rales. Chest:      Chest wall: No swelling or tenderness. Abdominal:      General: Abdomen is flat. Bowel sounds are normal.      Palpations: Abdomen is soft. Tenderness: There is no abdominal tenderness. Musculoskeletal:      Comments: B/L UE and LE FROM, nontender to palpation. Skin:     General: Skin is warm and dry. Coloration: Skin is not pale. Findings: Abrasion (superficial clean abrasion left thigh) present. No bruising or rash. Neurological:      Mental Status: She is alert. Mental status is at baseline. GCS: GCS eye subscore is 4. GCS verbal subscore is 5. GCS motor subscore is 6. Sensory: Sensation is intact. Motor: Motor function is intact. Comments: Patient alert and oriented to person and place. Psychiatric:         Mood and Affect: Mood normal.         Behavior: Behavior is cooperative. Cervical spine cleared by clinical criteria?  No (imaging required)      Invasive Devices     Peripheral Intravenous Line  Duration Peripheral IV 05/04/23 Distal;Left;Upper;Ventral (anterior) Arm 65 days    Peripheral IV 07/08/23 Right Antecubital <1 day          Drain  Duration           External Urinary Catheter 65 days                Lab Results:   Results Reviewed     Procedure Component Value Units Date/Time    HS Troponin I 2hr [[id number]]  (Normal) Collected: 07/08/23 1303    Lab Status: Final result Specimen: Blood from Arm, Right Updated: 07/08/23 1334     hs TnI 2hr 12 ng/L      Delta 2hr hsTnI -1 ng/L     Urine Microscopic [[id number]]  (Abnormal) Collected: 07/08/23 1220    Lab Status: Final result Specimen: Urine, Straight Cath Updated: 07/08/23 1307     RBC, UA 0-1 /hpf      WBC, UA 10-20 /hpf      Epithelial Cells None Seen /hpf      Bacteria, UA None Seen /hpf     Urine culture [[id number]] Collected: 07/08/23 1220    Lab Status:  In process Specimen: Urine, Straight Cath Updated: 07/08/23 1306    UA w Reflex to Microscopic w Reflex to Culture [[id number]]  (Abnormal) Collected: 07/08/23 1220    Lab Status: Final result Specimen: Urine, Straight Cath Updated: 07/08/23 1306     Color, UA Colorless     Clarity, UA Cloudy     Specific Gravity, UA 1.010     pH, UA 7.5     Leukocytes, UA Large     Nitrite, UA Negative     Protein, UA Trace mg/dl      Glucose, UA Negative mg/dl      Ketones, UA Negative mg/dl      Urobilinogen, UA <2.0 mg/dl      Bilirubin, UA Negative     Occult Blood, UA Trace    HS Troponin I 4hr [[id number]]     Lab Status: No result Specimen: Blood     HS Troponin 0hr (reflex protocol) [[id number]]  (Normal) Collected: 07/08/23 1045    Lab Status: Final result Specimen: Blood from Arm, Right Updated: 07/08/23 1121     hs TnI 0hr 13 ng/L     Comprehensive metabolic panel [[id number]]  (Abnormal) Collected: 07/08/23 1045    Lab Status: Final result Specimen: Blood from Arm, Right Updated: 07/08/23 1114     Sodium 138 mmol/L      Potassium 4.1 mmol/L      Chloride 105 mmol/L      CO2 27 mmol/L      ANION GAP 6 mmol/L      BUN 33 mg/dL      Creatinine 1.40 mg/dL      Glucose 110 mg/dL      Calcium 9.4 mg/dL      AST 11 U/L      ALT 10 U/L      Alkaline Phosphatase 85 U/L      Total Protein 7.8 g/dL      Albumin 3.6 g/dL      Total Bilirubin 0.74 mg/dL      eGFR 33 ml/min/1.73sq m     Narrative:      National Kidney Disease Foundation guidelines for Chronic Kidney Disease (CKD):   •  Stage 1 with normal or high GFR (GFR > 90 mL/min/1.73 square meters)  •  Stage 2 Mild CKD (GFR = 60-89 mL/min/1.73 square meters)  •  Stage 3A Moderate CKD (GFR = 45-59 mL/min/1.73 square meters)  •  Stage 3B Moderate CKD (GFR = 30-44 mL/min/1.73 square meters)  •  Stage 4 Severe CKD (GFR = 15-29 mL/min/1.73 square meters)  •  Stage 5 End Stage CKD (GFR <15 mL/min/1.73 square meters)  Note: GFR calculation is accurate only with a steady state creatinine    CK [[id number]]  (Normal) Collected: 07/08/23 1045    Lab Status: Final result Specimen: Blood from Arm, Right Updated: 07/08/23 1114     Total CK 45 U/L     Protime-INR [[id number]]  (Normal) Collected: 07/08/23 1045    Lab Status: Final result Specimen: Blood from Arm, Right Updated: 07/08/23 1109     Protime 13.2 seconds      INR 0.93    APTT [[id number]]  (Normal) Collected: 07/08/23 1045    Lab Status: Final result Specimen: Blood from Arm, Right Updated: 07/08/23 1109     PTT 29 seconds     CBC and differential [[id number]]  (Abnormal) Collected: 07/08/23 1045    Lab Status: Final result Specimen: Blood from Arm, Right Updated: 07/08/23 1055     WBC 14.05 Thousand/uL      RBC 3.64 Million/uL      Hemoglobin 10.9 g/dL      Hematocrit 34.3 %      MCV 94 fL      MCH 29.9 pg      MCHC 31.8 g/dL      RDW 15.1 %      MPV 9.7 fL      Platelets 629 Thousands/uL      nRBC 0 /100 WBCs      Neutrophils Relative 69 %      Immat GRANS % 1 %      Lymphocytes Relative 20 %      Monocytes Relative 8 %      Eosinophils Relative 2 %      Basophils Relative 0 %      Neutrophils Absolute 9.82 Thousands/µL Immature Grans Absolute 0.07 Thousand/uL      Lymphocytes Absolute 2.75 Thousands/µL      Monocytes Absolute 1.10 Thousand/µL      Eosinophils Absolute 0.26 Thousand/µL      Basophils Absolute 0.05 Thousands/µL                  Imaging Studies:   Direct to CT: No  TRAUMA - CT head wo contrast   Final Result by Nam Masters MD (07/08 1050)      No acute intracranial abnormality. Chronic microangiopathic changes. Workstation performed: [id number]         TRAUMA - CT spine cervical wo contrast   Final Result by Nam Masters MD (07/08 1057)      No cervical spine fracture or traumatic malalignment. Workstation performed: [id number]         XR Trauma chest portable   ED Interpretation by Wilbert Cuba PA-C (07/08 1038)   No acute abnormalities. Final Result by Nam Masters MD (07/08 1050)      No acute cardiopulmonary disease. Workstation performed: [id number]               Procedures  Procedures         ED Course  ED Course as of 07/08/23 1347   Sat Jul 08, 2023   1103 Cervical collar removed. Patient has FROM of neck, nontender. Medical Decision Making  Patient with unwitnessed fall, no complaints, will order labs, CT head and neck to r/o hemorrhage or cervical fracture. No other injuries. Abrasion left thigh - will update tetanus. Patient with UTI, will start on keflex, vitals stable, patient does have recurrent UTIs, urine culture pending. Head injury instructions and strict return precautions given to patient and son. Abrasion of left thigh: acute illness or injury  Fall: acute illness or injury  Head injury: acute illness or injury  UTI (urinary tract infection): acute illness or injury  Amount and/or Complexity of Data Reviewed  Independent Historian: EMS     Details: due to patient [place]. External Data Reviewed: labs, ECG and notes. Labs: ordered. Radiology: ordered and independent interpretation performed.   ECG/medicine tests: ordered and independent interpretation performed. Risk  Prescription drug management. Disposition  Priority One Transfer: No  Final diagnoses:   Fall   Head injury   UTI (urinary tract infection)   Abrasion of left thigh     Time reflects when diagnosis was documented in both MDM as applicable and the Disposition within this note     Time User Action Codes Description Comment    7/8/2023  1:13 PM Jenn Brazen Add [B99. XXXA] Fall     7/8/2023  1:13 PM Jenn Brazen Add [S09.90XA] Head injury     7/8/2023  1:13 PM Jenn Brazen Add [N39.0] UTI (urinary tract infection)     7/8/2023  1:14 PM Forrester, Salas Rush Jose Maria Abrasion of left thigh       ED Disposition     ED Disposition   Discharge    Condition   Stable    Date/Time   Sat Jul 8, 2023  1:35 PM    Comment   Dc Bruce discharge to home/self care. Follow-up Information     Follow up With Specialties Details Why Contact Info    David Beckford MD Family Medicine Schedule an appointment as soon as possible for a visit in 3 days For recheck [address] [place] [address] Road [address] [phone]          Patient's Medications   Discharge Prescriptions    CEPHALEXIN (KEFLEX) 500 MG CAPSULE    Take 1 capsule (500 mg total) by mouth every 12 (twelve) hours for 7 days       Start Date: 7/8/2023  End Date: 7/15/2023       Order Dose: 500 mg       Quantity: 14 capsule    Refills: 0    ONDANSETRON (ZOFRAN-ODT) 4 MG DISINTEGRATING TABLET    Take 1 tablet (4 mg total) by mouth every 6 (six) hours as needed for nausea or vomiting       Start Date: 7/8/2023  End Date: --       Order Dose: 4 mg       Quantity: 20 tablet    Refills: 0     No discharge procedures on file.     PDMP Review       Value Time User    PDMP Reviewed  Yes 5/6/2023  2:29 PM Anival Newby MD          ED Provider  Electronically Signed by         Justyn Lorenzo PA-C  07/08/23 3629

## 2023-07-08 NOTE — DISCHARGE INSTRUCTIONS
Rest, increase fluids. Take antibiotic as directed. Take zofran as needed for nausea. Follow up with family doctor in 2-3 days for recheck. Return to ER if change in behavior or vomiting.

## 2023-07-10 ENCOUNTER — HOME CARE VISIT (OUTPATIENT)
Dept: HOME HEALTH SERVICES | Facility: HOME HEALTHCARE | Age: 88
End: 2023-07-10
Payer: COMMERCIAL

## 2023-07-10 LAB
ATRIAL RATE: 82 BPM
P AXIS: 70 DEGREES
PR INTERVAL: 130 MS
QRS AXIS: 74 DEGREES
QRSD INTERVAL: 66 MS
QT INTERVAL: 382 MS
QTC INTERVAL: 474 MS
T WAVE AXIS: 72 DEGREES
VENTRICULAR RATE: 93 BPM

## 2023-07-10 PROCEDURE — 93010 ELECTROCARDIOGRAM REPORT: CPT | Performed by: INTERNAL MEDICINE

## 2023-07-10 PROCEDURE — G0156 HHCP-SVS OF AIDE,EA 15 MIN: HCPCS

## 2023-07-11 ENCOUNTER — HOME CARE VISIT (OUTPATIENT)
Dept: HOME HEALTH SERVICES | Facility: HOME HEALTHCARE | Age: 88
End: 2023-07-11
Payer: COMMERCIAL

## 2023-07-11 VITALS
RESPIRATION RATE: 18 BRPM | SYSTOLIC BLOOD PRESSURE: 120 MMHG | OXYGEN SATURATION: 97 % | TEMPERATURE: 97.5 F | HEART RATE: 66 BPM | DIASTOLIC BLOOD PRESSURE: 68 MMHG

## 2023-07-11 VITALS — OXYGEN SATURATION: 98 % | DIASTOLIC BLOOD PRESSURE: 78 MMHG | HEART RATE: 50 BPM | SYSTOLIC BLOOD PRESSURE: 132 MMHG

## 2023-07-11 LAB — BACTERIA UR CULT: ABNORMAL

## 2023-07-11 PROCEDURE — G0151 HHCP-SERV OF PT,EA 15 MIN: HCPCS

## 2023-07-11 PROCEDURE — G0299 HHS/HOSPICE OF RN EA 15 MIN: HCPCS

## 2023-07-12 ENCOUNTER — HOME CARE VISIT (OUTPATIENT)
Dept: HOME HEALTH SERVICES | Facility: HOME HEALTHCARE | Age: 88
End: 2023-07-12
Payer: COMMERCIAL

## 2023-07-12 PROCEDURE — G0156 HHCP-SVS OF AIDE,EA 15 MIN: HCPCS

## 2023-07-14 ENCOUNTER — TELEPHONE (OUTPATIENT)
Dept: HOME HEALTH SERVICES | Facility: HOME HEALTHCARE | Age: 88
End: 2023-07-14

## 2023-07-14 ENCOUNTER — HOME CARE VISIT (OUTPATIENT)
Dept: HOME HEALTH SERVICES | Facility: HOME HEALTHCARE | Age: 88
End: 2023-07-14
Payer: COMMERCIAL

## 2023-07-19 ENCOUNTER — HOME CARE VISIT (OUTPATIENT)
Dept: HOME HEALTH SERVICES | Facility: HOME HEALTHCARE | Age: 88
End: 2023-07-19
Payer: COMMERCIAL

## 2023-07-21 PROBLEM — B96.20 E. COLI UTI: Status: RESOLVED | Noted: 2023-02-28 | Resolved: 2023-07-21

## 2023-07-21 PROBLEM — N39.0 E. COLI UTI: Status: RESOLVED | Noted: 2023-02-28 | Resolved: 2023-07-21
